# Patient Record
Sex: FEMALE | Race: WHITE | Employment: OTHER | URBAN - METROPOLITAN AREA
[De-identification: names, ages, dates, MRNs, and addresses within clinical notes are randomized per-mention and may not be internally consistent; named-entity substitution may affect disease eponyms.]

---

## 2017-02-02 ENCOUNTER — OFFICE VISIT (OUTPATIENT)
Dept: INTERNAL MEDICINE CLINIC | Age: 67
End: 2017-02-02

## 2017-02-02 DIAGNOSIS — R05.9 COUGH: ICD-10-CM

## 2017-02-02 DIAGNOSIS — H26.9 CATARACT, RIGHT EYE: ICD-10-CM

## 2017-02-02 DIAGNOSIS — Z01.810 PREOP CARDIOVASCULAR EXAM: Primary | ICD-10-CM

## 2017-02-02 RX ORDER — BENZONATATE 200 MG/1
200 CAPSULE ORAL
Qty: 30 CAP | Refills: 0 | Status: SHIPPED | OUTPATIENT
Start: 2017-02-02 | End: 2017-02-03 | Stop reason: SDUPTHER

## 2017-02-02 NOTE — PROGRESS NOTES
HPI:  Joanna Brownlee is a 73y.o. year old female who presents today for preoperative medical evaluation. Procedure/Surgery:right eye cataract removal and lens implant  Date of Procedure/Surgery: 2/8/17  Surgeon: Dr Tino Brown. Hospital/Surgical Facility: [facility]. Latex Allergy: no    Recent use of: No recent use of aspirin (ASA), NSAIDS or steroids  Anesthesia Complications: None  History of abnormal bleeding : None  Health Care Directive or Living Will: yes   Has post nasal drip with mild dry cough since cold symptoms 2 weeks ago. Symptoms are improving. Current Outpatient Prescriptions   Medication Sig Dispense Refill    anastrozole (ARIMIDEX) 1 mg tablet Take 1 Tab by mouth daily. 90 Tab 3    fexofenadine (ALLEGRA) 180 mg tablet Take 1 Tab by mouth daily. As needed for allergy 30 Tab 11    LORazepam (ATIVAN) 1 mg tablet Take 1mg lorazepam 30 minutes prior to MRI. Is not a maintenance drug. 1 Tab 0    Denosumab (PROLIA) 60 mg/mL injection 60 mg by SubCUTAneous route.  anastrozole (ARIMIDEX) 1 mg tablet Take 1 Tab by mouth daily. 90 Tab 3    MULTIVITAMINS (MULTIVITAMIN PO) Take  by mouth daily.  calcium-vitamin D (CALCIUM 500+D) 500 mg(1,250mg) -200 unit per tablet Take 1 Tab by mouth.       OTHER theracran HP daily       Allergies   Allergen Reactions    Bactrim [Sulfamethoprim Ds] Other (comments)     Skin reaction    Ciprofloxacin Rash    Relafen [Nabumetone] Swelling     Facial Swelling     Past Medical History   Diagnosis Date    Avascular necrosis [facility]) age 45      right hip > replaced    AVN (avascular necrosis of bone) ([facility] age 56       (1997)     left hip > replaced    Breast cancer ([facility] 5/21/2013    Ectopic pregnancy age 44     (26)    Nausea & vomiting     Osteoarthritis of finger(s) age 53       (36)    Osteoporosis age 51   (56)    Other and unspecified hyperlipidemia 7/6/2010    Proctitis     Sarcoidosis ([facility] age 30   (200)     entirely resolved 12     Past Surgical History   Procedure Laterality Date    Pr treat ectopic preg,non remval  age 44  (1993)    Endoscopy, colon, diagnostic  age 55   (2006     told to repeat in 5 years Dr. Delores Goins Pr bone graft femur head/neck/ridge  age 56    (1997)     Left Hip    Pr revise total hip replacement  age 53     (1998)     right hip    Hx fracture tx  age 51    (1999)     5th metatarsal left foot    Pr incise finger tendon sheath   age 47    (2002)     Trigger finger    Pr revise total hip replacement  age 65      (2012)     left hip    Hx refractive surgery      Pr breast surgery procedure unlisted       BREST BIOPIES X 4 WITH 1 MALIGNANT.     Hx tubal ligation  age 44      (26)   [facility] Hx oophorectomy  age 41       (65)     RIGHT ONLY    Hx dilation and curettage  age 24      (26)    Hx hysterectomy  age 40       (46)   [facility] Pr pelvis/hip joint surgery unlisted  age 45     (36)     RIGHT HIP REPLACEMENT    Hx orthopaedic  2012     LEFT HIP REPLACEMENT    Hx orthopaedic  1997     LEFT BONE GRAFT DONE    Hx orthopaedic  1998     RIGHT HIP REVISION    Hx orthopaedic  2012     RIGHT HIP REVISION    Hx breast lumpectomy  5/22/2013     BREAST LUMPECTOMY WITH AXILLARY NODE DISSECTION performed by Shree Edgar MD at [address] Hx heent  2012     LASER SURGERY FOR \"HOLES IN RT & TETACHED RETINA IN LT\"    Hx heent       EXTRACTION OF WISDOM TEETH    Hx heent  8/2015     vitrectomy     Family History   Problem Relation Age of Onset    Breast Cancer Mother 67     in situ    Cancer Mother 67     breast ca and esophageal    Heart defect Mother      murmur diag in 19's, never treated    Arthritis-osteo Mother      late 52's early 63's    Osteoporosis Mother 76    Other Mother 78     lupus - declared disease free in 2004    Arrhythmia Mother 80     pacemaker    Hypertension Father     Heart defect Father      murmur idagnosed in 19's    Kidney Disease Father 80     renal stent    High Cholesterol Father     Heart Disease Father      aortic stenosis    Osteoporosis Brother 47    Arthritis-osteo Brother 49    Other Brother      age 34 hypoglycemia & age 47 Raynaud    Heart Disease Maternal Grandfather      developed in 42's    Hypertension Paternal Grandfather     Kidney Disease Paternal Grandfather     Other Maternal Grandmother 71     amyotrophic lateral sclerosis (ALS)/ALS    Hypertension Brother 47    Other Brother 47     GERD    High Cholesterol Brother 47    Other Brother 62     Multilevel DDD    Other Brother 62     Spondylolithesis    Other Brother 62     Servin's Esophagus, Diverticulosis, Gastritis    Other Brother 62     Bladder neck obstruction, swollen adrenal glands, small vessel disease    Cancer Brother 61     Melanoma and Squamous Cell Carcinoma     Social History   Substance Use Topics    Smoking status: Former Smoker     Packs/day: 0.25     Years: 6.00     Quit date: 1/1/1980    Smokeless tobacco: Never Used    Alcohol use 0.5 oz/week     1 Glasses of wine per week             Review of Systems   Constitutional: Negative for chills, fever and malaise/fatigue. HENT: Negative for congestion and sore throat. Eyes: Positive for blurred vision. Respiratory: Negative for cough, shortness of breath and wheezing. Cardiovascular: Negative for chest pain, palpitations and leg swelling. Gastrointestinal: Negative for abdominal pain, blood in stool, constipation, diarrhea, heartburn, nausea and vomiting. Genitourinary: Negative for dysuria, frequency, hematuria and urgency. Musculoskeletal: Negative for back pain and joint pain. Skin: Negative for rash. Neurological: Negative for dizziness, sensory change, focal weakness and headaches. Psychiatric/Behavioral: Negative for depression. The patient is not nervous/anxious and does not have insomnia. Physical Exam   Constitutional: She appears well-developed. No distress.    /78 (BP 1 Location: Right arm, BP Patient Position: Sitting)  Pulse 97  Temp 98.1 °F (36.7 °C) (Oral)   Resp 16  Ht 5' 0.5\" (1.537 m)  Wt 105 lb 9.6 oz (47.9 kg)  SpO2 98%  BMI 20.28 kg/m2   HENT:   Head: Normocephalic. Nose: Nose normal.   Mouth/Throat: Oropharynx is clear and moist.   Eyes: Conjunctivae and EOM are normal. Pupils are equal, round, and reactive to light. Neck: Carotid bruit is not present. No thyromegaly present. Cardiovascular: Normal rate, regular rhythm, normal heart sounds and intact distal pulses. No murmur heard. Pulmonary/Chest: Effort normal and breath sounds normal. She has no wheezes. Abdominal: Soft. Bowel sounds are normal. She exhibits no distension and no mass. There is no tenderness. Musculoskeletal: She exhibits no edema. Lymphadenopathy:     She has no cervical adenopathy. Psychiatric: She has a normal mood and affect. Vitals reviewed. Assessment & Plan:    ICD-10-CM ICD-9-CM    1. Preop cardiovascular exam   No contraindication to planned procedure. Z01.810 V72.81    2. Cataract, right eye H26.9 366.9    3. Cough  Flor Tamra resolving with some mild residual cough. Tessalon prn   R05 786.2      Orders Placed This Encounter    benzonatate (TESSALON) 200 mg capsule      Follow-up Disposition:  Return if symptoms worsen or fail to improve. Advised her to call back or return to office if symptoms worsen/change/persist.  Discussed expected course/resolution/complications of diagnosis in detail with patient. Medication risks/benefits/costs/interactions/alternatives discussed with patient. She was given an after visit summary which includes diagnoses, current medications, & vitals. She expressed understanding with the diagnosis and plan.

## 2017-02-03 RX ORDER — BENZONATATE 200 MG/1
200 CAPSULE ORAL
Qty: 10 CAP | Refills: 0 | Status: SHIPPED | OUTPATIENT
Start: 2017-02-03 | End: 2017-02-10

## 2017-02-06 VITALS
OXYGEN SATURATION: 98 % | TEMPERATURE: 98.1 F | HEIGHT: 61 IN | RESPIRATION RATE: 16 BRPM | DIASTOLIC BLOOD PRESSURE: 78 MMHG | SYSTOLIC BLOOD PRESSURE: 136 MMHG | WEIGHT: 105.6 LBS | BODY MASS INDEX: 19.94 KG/M2 | HEART RATE: 97 BPM

## 2017-04-10 RX ORDER — LORAZEPAM 1 MG/1
TABLET ORAL
Qty: 1 TAB | Refills: 0 | OUTPATIENT
Start: 2017-04-10

## 2017-04-10 RX ORDER — ANASTROZOLE 1 MG/1
1 TABLET ORAL DAILY
Qty: 90 TAB | Refills: 3 | Status: SHIPPED | OUTPATIENT
Start: 2017-04-10 | End: 2018-04-16 | Stop reason: SDUPTHER

## 2017-04-10 NOTE — TELEPHONE ENCOUNTER
Patient calling to get a 90 day refill supply sent to the pharmacy as well as a single ativan sent to the [facility]. Please call the patient if any questions [phone]. Requested Prescriptions     Pending Prescriptions Disp Refills    anastrozole (ARIMIDEX) 1 mg tablet 90 Tab 3     Sig: Take 1 Tab by mouth daily.  LORazepam (ATIVAN) 1 mg tablet 1 Tab 0     Sig: Take 1mg lorazepam 30 minutes prior to MRI. Is not a maintenance drug.

## 2017-04-11 RX ORDER — LORAZEPAM 1 MG/1
TABLET ORAL
Qty: 1 TAB | Refills: 0 | Status: SHIPPED | OUTPATIENT
Start: 2017-04-11 | End: 2017-09-07

## 2017-04-11 NOTE — TELEPHONE ENCOUNTER
Patient has MRI scheduled for beginning of may and wanted to make sure that this was processed in enough time for her to get it.

## 2017-04-11 NOTE — TELEPHONE ENCOUNTER
V.O for refill of Lorazepam called to [facility] pharmacist per written order by provider.

## 2017-05-04 ENCOUNTER — DOCUMENTATION ONLY (OUTPATIENT)
Dept: ONCOLOGY | Age: 67
End: 2017-05-04

## 2017-05-04 DIAGNOSIS — C50.911 MALIGNANT NEOPLASM OF RIGHT FEMALE BREAST, UNSPECIFIED SITE OF BREAST: Primary | ICD-10-CM

## 2017-05-04 DIAGNOSIS — Z98.890 S/P LUMPECTOMY, RIGHT BREAST: ICD-10-CM

## 2017-05-04 NOTE — PROGRESS NOTES
Request for Bilateral Breast MRI with and without contrast recd from [address]. TO provider. Need to fax to:  [phone] when complete.

## 2017-05-16 ENCOUNTER — DOCUMENTATION ONLY (OUTPATIENT)
Dept: ONCOLOGY | Age: 67
End: 2017-05-16

## 2017-05-23 ENCOUNTER — OFFICE VISIT (OUTPATIENT)
Dept: ONCOLOGY | Age: 67
End: 2017-05-23

## 2017-05-23 VITALS
BODY MASS INDEX: 19.63 KG/M2 | SYSTOLIC BLOOD PRESSURE: 105 MMHG | RESPIRATION RATE: 20 BRPM | TEMPERATURE: 98.3 F | WEIGHT: 104 LBS | HEIGHT: 61 IN | DIASTOLIC BLOOD PRESSURE: 70 MMHG | OXYGEN SATURATION: 100 % | HEART RATE: 74 BPM

## 2017-05-23 DIAGNOSIS — C50.911 MALIGNANT NEOPLASM OF RIGHT FEMALE BREAST, UNSPECIFIED SITE OF BREAST: Primary | ICD-10-CM

## 2017-05-23 DIAGNOSIS — F41.8 ANXIETY ABOUT HEALTH: ICD-10-CM

## 2017-05-23 DIAGNOSIS — Z79.811 AROMATASE INHIBITOR USE: ICD-10-CM

## 2017-05-23 DIAGNOSIS — Z98.890 S/P LUMPECTOMY, RIGHT BREAST: ICD-10-CM

## 2017-05-23 DIAGNOSIS — M81.0 OSTEOPOROSIS, UNSPECIFIED OSTEOPOROSIS TYPE, UNSPECIFIED PATHOLOGICAL FRACTURE PRESENCE: ICD-10-CM

## 2017-05-23 RX ORDER — AMOXICILLIN 500 MG/1
CAPSULE ORAL
COMMUNITY
Start: 2017-05-01 | End: 2017-09-07

## 2017-05-23 RX ORDER — CEPHALEXIN 250 MG/1
CAPSULE ORAL AS NEEDED
COMMUNITY
Start: 2017-05-22 | End: 2017-09-07

## 2017-05-23 RX ORDER — NITROFURANTOIN 25; 75 MG/1; MG/1
CAPSULE ORAL
COMMUNITY
Start: 2017-03-17 | End: 2017-05-23 | Stop reason: ALTCHOICE

## 2017-05-23 RX ORDER — DICLOFENAC SODIUM 1 MG/ML
SOLUTION/ DROPS OPHTHALMIC
COMMUNITY
Start: 2017-03-23 | End: 2017-05-23 | Stop reason: ALTCHOICE

## 2017-05-23 NOTE — MR AVS SNAPSHOT
Visit Information Date & Time Provider Department Dept. Phone Encounter #  
 5/23/2017  9:00 AM Rupinder Bustos, [address]  Oncology at [address] [id number] Follow-up Instructions Return in about 6 months (around 11/23/2017). Routing History Follow-up and Disposition History Your Appointments 10/24/2017  9:15 AM  
Medicare Physical with Matt Whittington MD  
[facility] Internal Medicine [facility]) Appt Note: CPE wellness [address] Critical access hospital [address]  
[id number] [address] Upcoming Health Maintenance Date Due INFLUENZA AGE 9 TO ADULT 8/1/2017 MEDICARE YEARLY EXAM 10/19/2017 GLAUCOMA SCREENING Q2Y 4/28/2018 BREAST CANCER SCRN MAMMOGRAM 7/5/2018 DTaP/Tdap/Td series (2 - Td) 10/14/2018 COLONOSCOPY 10/8/2023 Allergies as of 5/23/2017  Review Complete On: 5/23/2017 By: Rupinder Bustos DO Severity Noted Reaction Type Reactions Bactrim [Sulfamethoprim Ds]  10/18/2016    Other (comments) Skin reaction Ciprofloxacin  10/18/2016    Rash Relafen [Nabumetone] Low 08/18/2011    Swelling Facial Swelling Current Immunizations  Reviewed on 11/10/2016 Name Date Hepatitis A Vaccine 11/1/1999, 11/1/1999 Hepatitis B Vaccine 1/1/2004, 1/1/2000, 1/1/2000 Influenza Vaccine 10/18/2016, 10/15/2015, 9/25/2013 PPD 12/14/2004 Pneumococcal Conjugate (PCV-13) 3/10/2016 Pneumococcal Polysaccharide (PPSV-23) 3/13/2017 TD Vaccine 7/5/2006 TDAP Vaccine 10/14/2008 Zoster 4/15/2011 Not reviewed this visit You Were Diagnosed With   
  
 Codes Comments Malignant neoplasm of right female breast, unspecified site of breast ([facility]    -  Primary ICD-10-CM: C50.911 ICD-9-CM: 174.9 S/P lumpectomy, right breast     ICD-10-CM: Z90.11 ICD-9-CM: V45.89   
 Aromatase inhibitor use     ICD-10-CM: V48.705 ICD-9-CM: V07.52 Osteoporosis, unspecified osteoporosis type, unspecified pathological fracture presence     ICD-10-CM: M81.0 ICD-9-CM: 733.00 Anxiety about health     ICD-10-CM: F41.8 ICD-9-CM: 300.09 Vitals BP Pulse Temp Resp Height(growth percentile) Weight(growth percentile) 105/70 74 98.3 °F (36.8 °C) 20 5' 0.5\" (1.537 m) 104 lb (47.2 kg) SpO2 BMI OB Status Smoking Status 100% 19.98 kg/m2 Hysterectomy Former Smoker Vitals History BMI and BSA Data Body Mass Index Body Surface Area 19.98 kg/m 2 1.42 m 2 Preferred Pharmacy Pharmacy Name Phone [facility] [address] [place] [address], . [address] [phone] Your Updated Medication List  
  
   
This list is accurate as of: 5/23/17  9:40 AM.  Always use your most recent med list.  
  
  
  
  
 amoxicillin 500 mg capsule Commonly known as:  AMOXIL Prior to procedure * anastrozole 1 mg tablet Commonly known as:  ARIMIDEX Take 1 Tab by mouth daily. * anastrozole 1 mg tablet Commonly known as:  ARIMIDEX Take 1 Tab by mouth daily. CALCIUM 500+D 500 mg(1,250mg) -200 unit per tablet Generic drug:  calcium-vitamin D Take 1 Tab by mouth. cephALEXin 250 mg capsule Commonly known as:  [place]  
as needed. fexofenadine 180 mg tablet Commonly known as:  Kolleen Memory Take 1 Tab by mouth daily. As needed for allergy LORazepam 1 mg tablet Commonly known as:  ATIVAN Take 1mg lorazepam 30 minutes prior to MRI. Is not a maintenance drug. MULTIVITAMIN PO Take  by mouth daily. OTHER  
theracran HP daily PROLIA 60 mg/mL injection Generic drug:  denosumab 60 mg by SubCUTAneous route. * Notice: This list has 2 medication(s) that are the same as other medications prescribed for you.  Read the directions carefully, and ask your doctor or other care provider to review them with you. Follow-up Instructions Return in about 6 months (around 11/23/2017). Introducing [facility]! Dear Marine Hernandez: 
Thank you for requesting a [employer] account. Our records indicate that you already have an active [employer] account. You can access your account anytime at [email or web address]. [email or web address] Did you know that you can access your hospital and ER discharge instructions at any time in [employer]? You can also review all of your test results from your hospital stay or ER visit. Additional Information If you have questions, please visit the Frequently Asked Questions section of the [employer] website at [email or web address] Remember, [employer] is NOT to be used for urgent needs. For medical emergencies, dial 911. Now available from your iPhone and Android! Please provide this summary of care documentation to your next provider. Your primary care clinician is listed as Demario Cannon. If you have any questions after today's visit, please call [phone].

## 2017-05-23 NOTE — PROGRESS NOTES
HEME/ONC PROGRESS NOTE         Eulogio Butts is a 78 y.o. 1950 female and presents with Breast Cancer (follow up)    CC  Breast cancer dx 5/13    HPI: Patient is here for 6 mo fu breast cancer currently taking adjuvant Arimidex. Pt is doing well overall. She is tolerating the Arimidex well. MRI and mammo at [facility] 5/17 and had biopsy path fibrosis. Pt sends results to rad/onc and [facility].   Saw PCP and had labs 10/16 good. Pt has a mass at biopsy site and discomfort at site. To see surgery for f/u also. Has lots of questions today. DX   Breast Cancer     STAGE: T1bN0  ER+ her2 neg  oncotype low risk    TREATMENT COURSE: lumpectomy right 5/22/13,  XRT,  Arimidex. Past Medical History:   Diagnosis Date    Avascular necrosis [facility]) age 45     right hip > replaced    AVN (avascular necrosis of bone) ([facility] age 56       (1997)    left hip > replaced    Breast cancer ([facility] 5/21/2013    Ectopic pregnancy age 44     (26)    Nausea & vomiting     Osteoarthritis of finger(s) age 53       (36)    Osteoporosis age 51   (56)    Other and unspecified hyperlipidemia 7/6/2010    Proctitis     Sarcoidosis ([facility] age 30   (200)    entirely resolved 1986     Past Surgical History:   Procedure Laterality Date    BONE GRAFT FEMUR HEAD/NECK/RIDGE  age 56    (5)    Left Hip    BREAST SURGERY PROCEDURE UNLISTED      BREST BIOPIES X 4 WITH 1 MALIGNANT.     ENDOSCOPY, COLON, DIAGNOSTIC  age 55   (2006    told to repeat in 5 years Dr. Vinod Truong LUMPECTOMY  5/22/2013    BREAST LUMPECTOMY WITH AXILLARY NODE DISSECTION performed by Selene Mishra MD at [address]  age 24      (26)    HX FRACTURE TX  age 51    (56)    5th metatarsal left foot    HX HEENT  2012    LASER SURGERY FOR \"HOLES IN RT & TETACHED RETINA IN LT\"    HX HEENT      EXTRACTION OF WISDOM TEETH    HX HEENT  8/2015    vitrectomy    HX HYSTERECTOMY  age 40 (1996)    HX OOPHORECTOMY  age 41       (65)    RIGHT ONLY    HX ORTHOPAEDIC  2012    LEFT HIP REPLACEMENT    HX ORTHOPAEDIC  1997    LEFT BONE GRAFT DONE    HX ORTHOPAEDIC  1998    RIGHT HIP REVISION    HX ORTHOPAEDIC  2012    RIGHT HIP REVISION    HX REFRACTIVE SURGERY      HX TUBAL LIGATION  age 44      (26)    INCISE FINGER TENDON SHEATH   age 47    (65)    Trigger finger    PELVIS/HIP JOINT SURGERY UNLISTED  age 45     (36)    RIGHT HIP REPLACEMENT    OR REVISE TOTAL HIP REPLACEMENT  age 53     (56)    right hip    OR REVISE TOTAL HIP REPLACEMENT  age 65      (2012)    left hip    TREAT ECTOPIC PREG,NON REMVAL  age 44  (26)     Social History     Social History    Marital status:      Spouse name: N/A    Number of children: N/A    Years of education: N/A     Social History Main Topics    Smoking status: Former Smoker     Packs/day: 0.25     Years: 6.00     Quit date: 1/1/1980    Smokeless tobacco: Never Used    Alcohol use 0.5 oz/week     1 Glasses of wine per week    Drug use: No    Sexual activity: Yes     Partners: Male     Birth control/ protection: None     Other Topics Concern    None     Social History Narrative     Family History   Problem Relation Age of Onset    Breast Cancer Mother 67     in situ    Cancer Mother 67     breast ca and esophageal    Heart defect Mother      murmur diag in 19's, never treated    Arthritis-osteo Mother      late 52's early 63's    Osteoporosis Mother 76    Other Mother 78     lupus - declared disease free in 2004    Arrhythmia Mother 80     pacemaker    Hypertension Father     Heart defect Father      murmur idagnosed in 19's    Kidney Disease Father 80     renal stent    High Cholesterol Father     Heart Disease Father      aortic stenosis    Osteoporosis Brother 47    Arthritis-osteo Brother 47    Other Brother      age 34 hypoglycemia & age 47 Raynaud    Heart Disease Maternal Grandfather      developed in 42's  Hypertension Paternal Grandfather     Kidney Disease Paternal Grandfather     Other Maternal Grandmother 71     amyotrophic lateral sclerosis (ALS)/ALS    Hypertension Brother 47    Other Brother 47     GERD    High Cholesterol Brother 47    Other Brother 62     Multilevel DDD    Other Brother 62     Spondylolithesis    Other Brother 62     Servin's Esophagus, Diverticulosis, Gastritis    Other Brother 62     Bladder neck obstruction, swollen adrenal glands, small vessel disease    Cancer Brother 63     Melanoma and Squamous Cell Carcinoma       Current Outpatient Prescriptions   Medication Sig Dispense Refill    amoxicillin (AMOXIL) 500 mg capsule Prior to procedure      cephALEXin (KEFLEX) 250 mg capsule as needed.  LORazepam (ATIVAN) 1 mg tablet Take 1mg lorazepam 30 minutes prior to MRI. Is not a maintenance drug. 1 Tab 0    anastrozole (ARIMIDEX) 1 mg tablet Take 1 Tab by mouth daily. 90 Tab 3    anastrozole (ARIMIDEX) 1 mg tablet Take 1 Tab by mouth daily. 90 Tab 3    OTHER theracran HP daily      Denosumab (PROLIA) 60 mg/mL injection 60 mg by SubCUTAneous route.  MULTIVITAMINS (MULTIVITAMIN PO) Take  by mouth daily.  calcium-vitamin D (CALCIUM 500+D) 500 mg(1,250mg) -200 unit per tablet Take 1 Tab by mouth.  fexofenadine (ALLEGRA) 180 mg tablet Take 1 Tab by mouth daily. As needed for allergy 30 Tab 11       Allergies   Allergen Reactions    Bactrim [Sulfamethoprim Ds] Other (comments)     Skin reaction    Ciprofloxacin Rash    Relafen [Nabumetone] Swelling     Facial Swelling       Review of Systems    A comprehensive review of systems was negative except for:  Per HPI.       Objective:  Visit Vitals    /70    Pulse 74    Temp 98.3 °F (36.8 °C)    Resp 20    Ht 5' 0.5\" (1.537 m)    Wt 104 lb (47.2 kg)    SpO2 100%    BMI 19.98 kg/m2         Physical Exam:   General appearance - alert, well appearing, and in no distress, oriented to person, place, and time and normal appearing weight  Mental status - alert, oriented to person, place, and time, normal mood, behavior, speech, dress, motor activity, and thought processes  EYE-conj clear  Mouth - mucous membranes moist, pharynx normal without lesions  Neck - supple, no significant adenopathy   Chest - clear to auscultation, no wheezes, rales or rhonchi, symmetric air entry   Heart - normal rate and regular rhythm  Abdomen - soft, nontender  Ext-no pedal edema noted  Skin-Warm and dry. No rashes/lesions noted. Neuro -alert, oriented, normal speech, no focal findings or movement disorder noted  Breast -no masses palpable b/l except for mass at biopsy site ? Hematoma. Diagnostic Imaging   reviewed  Results for orders placed during the hospital encounter of 10/04/12   XR HIP RT AP 1 V    Narrative **Final Report**       ICD Codes / Adm. Diagnosis:    / Tamiko Nelson DUNNE RIGHT TOAL HI  DJD  Examination:  CR HIP 1 VW UNI RT  - [id number] - Oct  4 2012 11:02AM  Accession No:  [id number]  Reason:  Post-op Study      REPORT:  INDICATION:  DJD, postop. COMPARISON:  No old study. FINDINGS:  A portable view of the right hip shows a hip replacement in   satisfactory position. Postoperative change in the soft tissues with a   surgical drain and overlying sutures is present. IMPRESSION:  Right hip replacement. Signing/Reading Doctor: Bry Mir ([id number])    Approved: Bry Mir ([id number])  10/04/2012                                      No results found for this or any previous visit.     Lab Results  Reviewed  Per PCP    Lab Results   Component Value Date/Time    WBC 5.2 10/04/2016 09:42 AM    HGB 14.2 10/04/2016 09:42 AM    HCT 41.9 10/04/2016 09:42 AM    PLATELET 541 62/11/6100 09:42 AM    MCV 94 10/04/2016 09:42 AM       Lab Results   Component Value Date/Time    Sodium 142 10/04/2016 09:42 AM    Potassium 4.4 10/04/2016 09:42 AM    Chloride 103 10/04/2016 09:42 AM CO2 26 10/04/2016 09:42 AM    Anion gap 5 05/14/2013 11:15 AM    Glucose 89 10/04/2016 09:42 AM    BUN 16 10/04/2016 09:42 AM    Creatinine 0.74 10/04/2016 09:42 AM    BUN/Creatinine ratio 22 10/04/2016 09:42 AM    GFR est AA 98 10/04/2016 09:42 AM    GFR est non-AA 85 10/04/2016 09:42 AM    Calcium 9.3 10/04/2016 09:42 AM    AST (SGOT) 24 10/04/2016 09:42 AM    Alk. phosphatase 69 10/04/2016 09:42 AM    Protein, total 6.2 10/04/2016 09:42 AM    Albumin 4.4 10/04/2016 09:42 AM    Globulin 2.7 06/22/2010 08:32 AM    A-G Ratio 2.4 10/04/2016 09:42 AM    ALT (SGPT) 20 10/04/2016 09:42 AM       Assessment/Plan:     Marian Mcginnis is a 59 y.o. 1950 female and presents with Breast Cancer  . CC  Breast cancer    HPI: Patient is here for breast cancer currently taking adjuvant Arimidex. Doing well. STAGE: T1bN0  ER+ her2 neg  oncotype low risk. TREATMENT COURSE: lumpectomy 5/22/13  XRT. Arimadex. 1.  Stage 1 ER+ Her2 neg Breast cancer s/p lump/ XRT. NERD    Patient is tolerating Arimidex with minimal side effects    Continue Arimidex for 5 years and then can do BCI as pt wants to know risk of recurrence then.     mammo and breast MRI done at [facility] 5/17 and had biopsy with mass at site and path fibrosis. Recommend surgery fu and pt will call surgery. Labs stable per PCP     2. Osteoporosis  Per endocrine. Remains on prolia. 3.  Vaginal dryness. Per GYN. Will follow-up in 6 months, sooner if needed. Call if questions. ICD-10-CM ICD-9-CM    1. Malignant [facility] right female breast, [facility] C50.911 174.9    2. S/P lumpectomy, right breast Z90.11 V45.89    3. Aromatase inhibitor use Z79.811 V07.52    4. Osteoporosis, unspecified osteoporosis type, unspecified pathological fracture presence M81.0 733.00    5.  Anxiety about health F41.8 300.09      Orders Placed This Encounter    amoxicillin (AMOXIL) 500 mg capsule     Sig: Prior to procedure    cephALEXin (KEFLEX) 250 mg capsule     Sig: as needed.  DISCONTD: diclofenac (VOLTAREN) 0.1 % ophthalmic solution    DISCONTD: nitrofurantoin, macrocrystal-monohydrate, (MACROBID) 100 mg capsule       continue present plan, call if any problems  There are no Patient Instructions on file for this visit. Follow-up Disposition:  Return in about 6 months (around 11/23/2017).     Yousuf Cat, DO

## 2017-09-05 ENCOUNTER — TELEPHONE (OUTPATIENT)
Dept: INTERNAL MEDICINE CLINIC | Age: 67
End: 2017-09-05

## 2017-09-05 NOTE — TELEPHONE ENCOUNTER
I called the patient and she stated that she was notified that the records were fax'd on Saturday. I have checked the mail and at this time we do not have updated records. Pt states that she will call the Eye doctor and make a follow up visit with Dr Liam Malcolm once we notifie her that we have rec'd the records.

## 2017-09-05 NOTE — TELEPHONE ENCOUNTER
Pt called back to the ofice and requested that our office call [facility] and request records from 09/02/2017. I called and spoke to Manuel Vaca who requested that I fax a records request over to [phone] d/t this not being a same day request.  Records for records were fax'd and confirmation received.

## 2017-09-06 ENCOUNTER — TELEPHONE (OUTPATIENT)
Dept: INTERNAL MEDICINE CLINIC | Age: 67
End: 2017-09-06

## 2017-09-06 NOTE — TELEPHONE ENCOUNTER
Patient called to see if we had received records from [facility] concerning her emergency appt over the weekend.   She will be bringing a copy by the office this morning -- has an appt with Dr. Otf Pa am.

## 2017-09-07 ENCOUNTER — HOSPITAL ENCOUNTER (OUTPATIENT)
Age: 67
Setting detail: OBSERVATION
Discharge: HOME | End: 2017-09-07
Attending: EMERGENCY MEDICINE | Admitting: HOSPITALIST
Payer: MEDICARE

## 2017-09-07 ENCOUNTER — APPOINTMENT (OUTPATIENT)
Dept: MRI IMAGING | Age: 67
End: 2017-09-07
Attending: HOSPITALIST
Payer: MEDICARE

## 2017-09-07 ENCOUNTER — APPOINTMENT (OUTPATIENT)
Dept: CT IMAGING | Age: 67
End: 2017-09-07
Attending: EMERGENCY MEDICINE
Payer: MEDICARE

## 2017-09-07 ENCOUNTER — OFFICE VISIT (OUTPATIENT)
Dept: INTERNAL MEDICINE CLINIC | Age: 67
End: 2017-09-07

## 2017-09-07 VITALS
OXYGEN SATURATION: 99 % | BODY MASS INDEX: 20.73 KG/M2 | TEMPERATURE: 97.5 F | HEIGHT: 60 IN | SYSTOLIC BLOOD PRESSURE: 135 MMHG | DIASTOLIC BLOOD PRESSURE: 81 MMHG | WEIGHT: 105.6 LBS | RESPIRATION RATE: 14 BRPM | HEART RATE: 67 BPM

## 2017-09-07 VITALS
DIASTOLIC BLOOD PRESSURE: 70 MMHG | OXYGEN SATURATION: 99 % | RESPIRATION RATE: 16 BRPM | SYSTOLIC BLOOD PRESSURE: 120 MMHG | HEART RATE: 56 BPM | HEIGHT: 61 IN | BODY MASS INDEX: 19.86 KG/M2 | TEMPERATURE: 97.6 F | WEIGHT: 105.2 LBS

## 2017-09-07 DIAGNOSIS — H54.3 VISION LOSS, BILATERAL: Primary | ICD-10-CM

## 2017-09-07 DIAGNOSIS — H53.9 VISION ABNORMALITIES: Primary | ICD-10-CM

## 2017-09-07 DIAGNOSIS — H53.139: ICD-10-CM

## 2017-09-07 DIAGNOSIS — H53.9 VISION CHANGES: ICD-10-CM

## 2017-09-07 DIAGNOSIS — E78.00 PURE HYPERCHOLESTEROLEMIA: ICD-10-CM

## 2017-09-07 LAB
ALBUMIN SERPL-MCNC: 4.1 G/DL (ref 3.5–5)
ALBUMIN/GLOB SERPL: 1.2 {RATIO} (ref 1.1–2.2)
ALP SERPL-CCNC: 83 U/L (ref 45–117)
ALT SERPL-CCNC: 33 U/L (ref 12–78)
ANION GAP SERPL CALC-SCNC: 8 MMOL/L (ref 5–15)
AST SERPL-CCNC: 25 U/L (ref 15–37)
ATRIAL RATE: 62 BPM
BASOPHILS # BLD: 0 K/UL (ref 0–0.1)
BASOPHILS NFR BLD: 0 % (ref 0–1)
BILIRUB SERPL-MCNC: 0.6 MG/DL (ref 0.2–1)
BUN SERPL-MCNC: 13 MG/DL (ref 6–20)
BUN/CREAT SERPL: 15 (ref 12–20)
CALCIUM SERPL-MCNC: 9.2 MG/DL (ref 8.5–10.1)
CALCULATED P AXIS, ECG09: 53 DEGREES
CALCULATED R AXIS, ECG10: 23 DEGREES
CALCULATED T AXIS, ECG11: 32 DEGREES
CHLORIDE SERPL-SCNC: 106 MMOL/L (ref 97–108)
CHOLEST SERPL-MCNC: 251 MG/DL
CO2 SERPL-SCNC: 27 MMOL/L (ref 21–32)
CREAT SERPL-MCNC: 0.85 MG/DL (ref 0.55–1.02)
DIAGNOSIS, 93000: NORMAL
EOSINOPHIL # BLD: 0 K/UL (ref 0–0.4)
EOSINOPHIL NFR BLD: 0 % (ref 0–7)
ERYTHROCYTE [DISTWIDTH] IN BLOOD BY AUTOMATED COUNT: 12.2 % (ref 11.5–14.5)
ERYTHROCYTE [SEDIMENTATION RATE] IN BLOOD: 16 MM/HR (ref 0–30)
EST. AVERAGE GLUCOSE BLD GHB EST-MCNC: 105 MG/DL
GLOBULIN SER CALC-MCNC: 3.5 G/DL (ref 2–4)
GLUCOSE SERPL-MCNC: 94 MG/DL (ref 65–100)
HBA1C MFR BLD: 5.3 % (ref 4.2–6.3)
HCT VFR BLD AUTO: 42.5 % (ref 35–47)
HDLC SERPL-MCNC: 81 MG/DL
HDLC SERPL: 3.1 {RATIO} (ref 0–5)
HGB BLD-MCNC: 14.2 G/DL (ref 11.5–16)
LDLC SERPL CALC-MCNC: 146.2 MG/DL (ref 0–100)
LIPID PROFILE,FLP: ABNORMAL
LYMPHOCYTES # BLD: 1.6 K/UL (ref 0.8–3.5)
LYMPHOCYTES NFR BLD: 24 % (ref 12–49)
MCH RBC QN AUTO: 31.6 PG (ref 26–34)
MCHC RBC AUTO-ENTMCNC: 33.4 G/DL (ref 30–36.5)
MCV RBC AUTO: 94.7 FL (ref 80–99)
MONOCYTES # BLD: 0.6 K/UL (ref 0–1)
MONOCYTES NFR BLD: 9 % (ref 5–13)
NEUTS SEG # BLD: 4.6 K/UL (ref 1.8–8)
NEUTS SEG NFR BLD: 67 % (ref 32–75)
P-R INTERVAL, ECG05: 148 MS
PLATELET # BLD AUTO: 247 K/UL (ref 150–400)
POTASSIUM SERPL-SCNC: 3.5 MMOL/L (ref 3.5–5.1)
PROT SERPL-MCNC: 7.6 G/DL (ref 6.4–8.2)
Q-T INTERVAL, ECG07: 408 MS
QRS DURATION, ECG06: 66 MS
QTC CALCULATION (BEZET), ECG08: 414 MS
RBC # BLD AUTO: 4.49 M/UL (ref 3.8–5.2)
SODIUM SERPL-SCNC: 141 MMOL/L (ref 136–145)
TRIGL SERPL-MCNC: 119 MG/DL (ref ?–150)
TROPONIN I SERPL-MCNC: <0.04 NG/ML
VENTRICULAR RATE, ECG03: 62 BPM
VLDLC SERPL CALC-MCNC: 23.8 MG/DL
WBC # BLD AUTO: 6.9 K/UL (ref 3.6–11)

## 2017-09-07 PROCEDURE — 70553 MRI BRAIN STEM W/O & W/DYE: CPT

## 2017-09-07 PROCEDURE — 99285 EMERGENCY DEPT VISIT HI MDM: CPT

## 2017-09-07 PROCEDURE — 85025 COMPLETE CBC W/AUTO DIFF WBC: CPT | Performed by: EMERGENCY MEDICINE

## 2017-09-07 PROCEDURE — 70544 MR ANGIOGRAPHY HEAD W/O DYE: CPT

## 2017-09-07 PROCEDURE — 93005 ELECTROCARDIOGRAM TRACING: CPT

## 2017-09-07 PROCEDURE — A9576 INJ PROHANCE MULTIPACK: HCPCS | Performed by: HOSPITALIST

## 2017-09-07 PROCEDURE — G8978 MOBILITY CURRENT STATUS: HCPCS

## 2017-09-07 PROCEDURE — 74011250636 HC RX REV CODE- 250/636: Performed by: HOSPITALIST

## 2017-09-07 PROCEDURE — 80053 COMPREHEN METABOLIC PANEL: CPT | Performed by: EMERGENCY MEDICINE

## 2017-09-07 PROCEDURE — 80061 LIPID PANEL: CPT | Performed by: HOSPITALIST

## 2017-09-07 PROCEDURE — 99218 HC RM OBSERVATION: CPT

## 2017-09-07 PROCEDURE — 84484 ASSAY OF TROPONIN QUANT: CPT | Performed by: EMERGENCY MEDICINE

## 2017-09-07 PROCEDURE — 83036 HEMOGLOBIN GLYCOSYLATED A1C: CPT | Performed by: HOSPITALIST

## 2017-09-07 PROCEDURE — 97116 GAIT TRAINING THERAPY: CPT

## 2017-09-07 PROCEDURE — 36415 COLL VENOUS BLD VENIPUNCTURE: CPT | Performed by: EMERGENCY MEDICINE

## 2017-09-07 PROCEDURE — G8979 MOBILITY GOAL STATUS: HCPCS

## 2017-09-07 PROCEDURE — 85652 RBC SED RATE AUTOMATED: CPT | Performed by: PHYSICIAN ASSISTANT

## 2017-09-07 PROCEDURE — G8980 MOBILITY D/C STATUS: HCPCS

## 2017-09-07 PROCEDURE — 74011000250 HC RX REV CODE- 250: Performed by: PHYSICIAN ASSISTANT

## 2017-09-07 PROCEDURE — 93880 EXTRACRANIAL BILAT STUDY: CPT

## 2017-09-07 PROCEDURE — 97161 PT EVAL LOW COMPLEX 20 MIN: CPT

## 2017-09-07 PROCEDURE — 70450 CT HEAD/BRAIN W/O DYE: CPT

## 2017-09-07 PROCEDURE — 93306 TTE W/DOPPLER COMPLETE: CPT

## 2017-09-07 RX ORDER — ASPIRIN 81 MG/1
81 TABLET ORAL DAILY
Qty: 30 TAB | Refills: 1 | Status: SHIPPED | OUTPATIENT
Start: 2017-09-08

## 2017-09-07 RX ORDER — THERA TABS 400 MCG
1 TAB ORAL DAILY
Status: DISCONTINUED | OUTPATIENT
Start: 2017-09-08 | End: 2017-09-07 | Stop reason: HOSPADM

## 2017-09-07 RX ORDER — ANASTROZOLE 1 MG/1
1 TABLET ORAL DAILY
Status: DISCONTINUED | OUTPATIENT
Start: 2017-09-08 | End: 2017-09-07 | Stop reason: HOSPADM

## 2017-09-07 RX ORDER — TETRACAINE HYDROCHLORIDE 5 MG/ML
1 SOLUTION OPHTHALMIC
Status: COMPLETED | OUTPATIENT
Start: 2017-09-07 | End: 2017-09-07

## 2017-09-07 RX ORDER — THERA TABS 400 MCG
1 TAB ORAL DAILY
COMMUNITY
End: 2020-11-12

## 2017-09-07 RX ORDER — ATORVASTATIN CALCIUM 20 MG/1
40 TABLET, FILM COATED ORAL DAILY
Status: DISCONTINUED | OUTPATIENT
Start: 2017-09-08 | End: 2017-09-07 | Stop reason: HOSPADM

## 2017-09-07 RX ORDER — SODIUM CHLORIDE 0.9 % (FLUSH) 0.9 %
5-10 SYRINGE (ML) INJECTION EVERY 8 HOURS
Status: DISCONTINUED | OUTPATIENT
Start: 2017-09-07 | End: 2017-09-07 | Stop reason: HOSPADM

## 2017-09-07 RX ORDER — FERROUS SULFATE, DRIED 160(50) MG
1 TABLET, EXTENDED RELEASE ORAL
Status: DISCONTINUED | OUTPATIENT
Start: 2017-09-08 | End: 2017-09-07 | Stop reason: HOSPADM

## 2017-09-07 RX ORDER — SODIUM CHLORIDE 9 MG/ML
75 INJECTION, SOLUTION INTRAVENOUS CONTINUOUS
Status: DISCONTINUED | OUTPATIENT
Start: 2017-09-07 | End: 2017-09-07

## 2017-09-07 RX ORDER — SODIUM CHLORIDE 0.9 % (FLUSH) 0.9 %
20 SYRINGE (ML) INJECTION
Status: COMPLETED | OUTPATIENT
Start: 2017-09-07 | End: 2017-09-07

## 2017-09-07 RX ORDER — SODIUM CHLORIDE 0.9 % (FLUSH) 0.9 %
5-10 SYRINGE (ML) INJECTION AS NEEDED
Status: DISCONTINUED | OUTPATIENT
Start: 2017-09-07 | End: 2017-09-07 | Stop reason: HOSPADM

## 2017-09-07 RX ORDER — ASPIRIN 81 MG/1
81 TABLET ORAL DAILY
Status: DISCONTINUED | OUTPATIENT
Start: 2017-09-08 | End: 2017-09-07 | Stop reason: HOSPADM

## 2017-09-07 RX ORDER — SODIUM CHLORIDE 0.9 % (FLUSH) 0.9 %
10 SYRINGE (ML) INJECTION
Status: COMPLETED | OUTPATIENT
Start: 2017-09-07 | End: 2017-09-07

## 2017-09-07 RX ORDER — HYDRALAZINE HYDROCHLORIDE 20 MG/ML
10 INJECTION INTRAMUSCULAR; INTRAVENOUS
Status: DISCONTINUED | OUTPATIENT
Start: 2017-09-07 | End: 2017-09-07 | Stop reason: HOSPADM

## 2017-09-07 RX ADMIN — FLUORESCEIN SODIUM 1 STRIP: 1 STRIP OPHTHALMIC at 11:06

## 2017-09-07 RX ADMIN — GADOTERIDOL 10 ML: 279.3 INJECTION, SOLUTION INTRAVENOUS at 16:31

## 2017-09-07 RX ADMIN — Medication 20 ML: at 13:25

## 2017-09-07 RX ADMIN — Medication 10 ML: at 16:32

## 2017-09-07 RX ADMIN — TETRACAINE HYDROCHLORIDE 1 DROP: 5 SOLUTION OPHTHALMIC at 11:06

## 2017-09-07 NOTE — ROUTINE PROCESS
TRANSFER - OUT REPORT:    Verbal report given to [facility] RN(name) on Gabbie Patel  being transferred to George Rosales RN(unit) for routine progression of care       Report consisted of patients Situation, Background, Assessment and   Recommendations(SBAR). Information from the following report(s) SBAR, ED Summary, [facility] and Recent Results was reviewed with the receiving nurse. Lines:   Peripheral IV Right (Active)       Peripheral IV 09/07/17 Left Antecubital (Active)   Site Assessment Clean, dry, & intact 9/7/2017  9:58 AM   Phlebitis Assessment 0 9/7/2017  9:58 AM   Infiltration Assessment 0 9/7/2017  9:58 AM   Dressing Status Clean, dry, & intact 9/7/2017  9:58 AM   Dressing Type Transparent 9/7/2017  9:58 AM   Hub Color/Line Status Pink;Flushed;Patent 9/7/2017  9:58 AM   Action Taken Blood drawn 9/7/2017  9:58 AM        Opportunity for questions and clarification was provided.       Patient transported with:   transport

## 2017-09-07 NOTE — H&P
[address]   [address] [place] [address], [address]   HISTORY AND PHYSICAL       Name:  Stanislaw Law   MR#:  [id number]   :  1950   Account #:  [de-identified]        Date of Adm:  2017       PRIMARY CARE PHYSICIAN: Carmen Hernandez MD    PRIMARY ONCOLOGIST: Dr. Maikol Burns: Changes in vision. HISTORY OF PRESENT ILLNESS: This is a 60-year-old female with a   past medical history of breast cancer status post lumpectomy being   followed by Dr. Apryl Benavides, and osteoporosis. She comes over   here because of changes in vision. She claims that about 6 days   ago, on 2017, in the morning when she was taking a shower she   noted that there were changes in the vision on her left eye. How she   describes it is that about \"50% to 95%\" loss of vision in her left eye. That went on and off for some time, until after a few minute it   was resolved. She called her primary ophthalmologist, who is     and Dr. Sue Rodarte, and she was seen by her ophthalmologist the next   day where she was told that her eye examination was perfectly at her   baseline. She was asked to see her primary care physician for probable   TIA workup. She went to see her primary care physician and while in   the clinic she noted that similar symptoms were happening, but now in   the right eye. That is why the patient was brought into the ER. No chest pain, shortness of breath, headache, dizziness. No   imbalance. No nausea, vomiting, fever, cough, changes in bowel   movements. No head injury. REVIEW OF SYSTEMS: Pertinent positives as above. Rest of the   review of systems were reviewed and were negative. PAST MEDICAL HISTORY   1. Breast cancer   2. Osteoporosis. 3. Sarcoidosis. PAST SURGICAL HISTORY   1. Lumpectomy done in . 2. Hysterectomy done in .   3. Left hip replacement in . FAMILY HISTORY: Significant for breast cancer and coronary artery   disease.     SOCIAL HISTORY: Nonsmoker. Drinks alcohol socially. In the last 1   week, she has had 3 beers. Non-IV drug abuser. ALLERGIES   1. BACTRIM. 2. CIPROFLOXACIN. HOME ALLERGIES   1. Arimidex 1 mg p.o. daily. 2. Prolia 60 mg subcutaneous every 6 months. 3. Multivitamin daily. PHYSICAL EXAMINATION   VITAL SIGNS: At the time the patient was seen, vitals were blood   pressure 166/59, pulse 56, afebrile, respiratory rate 14, saturating   100% on room air. GENERAL: Not in distress, comfortably sitting on bed. HEAD: Normocephalic, atraumatic. EYES: PERRLA, EOMI. EARS: Bilateral hearing normal, no growths. NOSE: No polyps, no bleeding. MOUTH: Moist mucous membranes. Decent oral hygiene. NECK: Supple. No jugular venous distention, no thyromegaly. RESPIRATORY: Clear to auscultation bilaterally. No adventitious   breath sounds. CARDIOVASCULAR: S1, S2 normal. No murmurs, rubs or gallops. GASTROINTESTINAL: Bowel sounds present. Soft, nontender,   nondistended. NEUROLOGICAL: Cranial nerves 2 through 12 intact. Motor 5/5   bilaterally upper limbs and lower limbs. Sensory normal.   PSYCHIATRIC: Mood and affect appropriate. Alert and oriented x3. SKIN: No rashes or ulcers. LABORATORY AND RADIOLOGICAL RESULTS: WBC 6.9,   hemoglobin 14.2, hematocrit 42, and a platelet count of 239. Sodium   141, potassium 3.5, chloride 106, bicarbonate 27, glucose 94, BUN 13,   creatinine 0.85. Troponin x1 is negative. CT scan of the head was   done, which did not show any acute abnormalities. EKG shows normal   sinus tenderness. Changes suggestive of ischemia. ASSESSMENT AND PLAN: This is a 61-year-old female with past   medical history of breast cancer and osteoporosis. 1. She comes over here with changes in vision/amaurosis fugax   possibly.  The patient claims that she has a history of floaters and that   is why she follows up with the ophthalmologist. Currently, she is back   to her baseline but the possibility of a transient ischemic attack needs   to be ruled out. I would admit the patient under observation (patient   has already been informed about the observation status). I would get   an MRA as well as carotid and echo on the patient, that has already   been ordered. I will put the patient on aspirin and statin, and will   consult Neurology regarding the same. 2. Breast cancer, status post lumpectomy. The patient is being followed   by Dr. Tony Alvarado as an outpatient. 3. Osteoporosis. The patient is on Prolia. Follow up with her primary   care physician. I spent about 40 minutes in taking care of the patient.         Uriah Shearer MD      PG / TLM   D:  09/07/2017   12:09   T:  09/07/2017   12:38   Job #:  [id number]

## 2017-09-07 NOTE — PROGRESS NOTES
TRANSFER - IN REPORT:    Verbal report received from Sonia RN(name) on Mike [place]  being received from ED (unit) for routine progression of care      Report consisted of patients Situation, Background, Assessment and   Recommendations(SBAR). Information from the following report(s) SBAR, Kardex, ED Summary, [facility] and Recent Results was reviewed with the receiving nurse. Opportunity for questions and clarification was provided. Assessment completed upon patients arrival to unit and care assumed.

## 2017-09-07 NOTE — CONSULTS
[address] [facility] [facility] [address]   [address] [facility]       Name:  Jos Little   MR#:  [id number]   :  1950   Account #:  [de-identified]    Date of Consultation:  2017   Date of Adm:  2017       REQUESTING PHYSICIAN: Dr. Saintclair Mike. REASON FOR EVALUATION: Visual disturbance. HISTORY OF PRESENT ILLNESS: The patient is a 61-year-old   female with past medical history of breast cancer, osteoporosis, and   sarcoidosis, who was in her usual baseline state of health when about   6 days ago on 2017 while she was taking a shower and was   looking at a plain tile on the wall she noticed that she suddenly had a   big patch of darkness appear in almost 95% of her visual field of the   left eye. It came on in about 8 to 10 seconds and then quickly   disappeared. She called her ophthalmologist and saw them the next   day and her eye exam was negative. She was advised to see her PCP,   which she did see yesterday and while she was there she had the   same symptoms return in her right eye, after which she was sent in to   the hospital. She has floaters at her baseline in both eyes and every   now and then she will try to close one eye and check on her floaters. She states that she can reproduce the symptoms if she closes one eye   and stares at a plain, solid color/wall and she can recreate it in both   eyes. There were no other neurological symptoms including headache,   nausea, vomiting, changes in speech or focal motor sensory deficits in   the extremities. PAST MEDICAL HISTORY: As mentioned above. SOCIAL HISTORY: Nonsmoker. Drinks alcohol socially. ALLERGIES   1. BACTRIM. 2. CIPROFLOXACIN. HOME MEDICATIONS   1. Arimidex. 2. Prolia. PHYSICAL EXAMINATION   GENERAL: The patient is alert, fully oriented. VITAL SIGNS: Blood pressure 135/81, temperature 97.5, pulse was   67. NEUROLOGIC: Speech is clear.  Comprehension is normal. Pupils are equal, round, reactive. Extraocular movements are full. Face is   symmetric. Tongue is midline. Hearing is baseline. Muscle tone and   bulk is normal. Strength is normal in both upper and lower extremities. Deep tendon reflexes are 2/2 and symmetric. Toes are downgoing. Sensation is intact. Gait is normal.   HEART: Regular rate. CHEST: Clear. ABDOMEN: Soft, nontender, positive bowel sounds. EXTREMITIES: No edema. LABORATORY DATA: CBC is normal. Chemistries normal. Lipid   profile: Triglycerides 119, HDL 81, .2. IMAGING: MRI scan of the brain to my review does not show any   significant stenosis. Carotid duplex is negative for any hemodynamically significant   stenosis. ASSESSMENT AND PLAN: A 81-year-old female admitted with   symptoms of sudden, near-complete vision loss lasting a few seconds   in one eye, followed by another eye. Thereafter, the patient has been   able to reproduce this symptom by staring and covering the other eye. This is not strongly suggestive of transient ischemic attack (TIA) or   amaurosis fugax. In any case, she has had workup done in this regard   and it is negative. It will be reasonable to continue baby aspirin and   given her LDL greater than 140, we may consider a low-dose statin as   well. If the official MRI report is negative, she could be discharged   home. She should avoid doing what precipitates her symptoms. Please feel free to contact me with any further questions. Thank you   for this consultation.         Paradise Roy MD      AS / Jim Winters   D:  09/07/2017   18:55   T:  09/07/2017   19:19   Job #:  [id number]

## 2017-09-07 NOTE — ED NOTES
I personally saw and examined the patient. I have reviewed and agree with the MLP's findings, including all diagnostic interpretations, and plans as written. I was present during the key portions of separately billed procedures.     Starr Almazan MD

## 2017-09-07 NOTE — IP AVS SNAPSHOT
[address] 
[phone] Patient: William Corcoran MRN: [id number] Current Discharge Medication List  
  
START taking these medications Dose & Instructions Dispensing Information Comments Morning Noon Evening Bedtime  
 aspirin delayed-release 81 mg tablet Start taking on:  9/8/2017 Your last dose was: Your next dose is:    
   
   
 Dose:  81 mg Take 1 Tab by mouth daily. Quantity:  30 Tab Refills:  1 CONTINUE these medications which have NOT CHANGED Dose & Instructions Dispensing Information Comments Morning Noon Evening Bedtime  
 anastrozole 1 mg tablet Commonly known as:  ARIMIDEX Your last dose was: Your next dose is:    
   
   
 Dose:  1 mg Take 1 Tab by mouth daily. Quantity:  90 Tab Refills:  3 CALCIUM 500+D 500 mg(1,250mg) -200 unit per tablet Generic drug:  calcium-vitamin D Your last dose was: Your next dose is:    
   
   
 Dose:  1 Tab Take 1 Tab by mouth. Refills:  0  
     
   
   
   
  
 OTHER Your last dose was: Your next dose is:    
   
   
 Dose:  1 Cap Take 1 Cap by mouth daily. theracran HP daily Refills:  0 PROLIA 60 mg/mL injection Generic drug:  denosumab Your last dose was: Your next dose is:    
   
   
 Dose:  60 mg  
60 mg by SubCUTAneous route. Every six months Refills:  0  
     
   
   
   
  
 therapeutic multivitamin tablet Commonly known as:  [facility] Your last dose was: Your next dose is:    
   
   
 Dose:  1 Tab Take 1 Tab by mouth daily. Refills:  0 Where to Get Your Medications Information on where to get these meds will be given to you by the nurse or doctor. ! Ask your nurse or doctor about these medications aspirin delayed-release 81 mg tablet

## 2017-09-07 NOTE — PROGRESS NOTES
Admission Medication Reconciliation:    Information obtained from: patient, rx query    Significant PMH/Disease States:   Past Medical History:   Diagnosis Date    Avascular necrosis ([facility] age 45     right hip > replaced    [facility]) [facility] age 56       ()    left hip > replaced    Breast cancer ([facility] 2013    Ectopic pregnancy age 44     (26)    Nausea & vomiting     Osteoarthritis of finger(s) age 53       (36)    Osteoporosis age 51   (56)    Other and unspecified hyperlipidemia 2010    Proctitis     Sarcoidosis ([facility] age 30   (200)    entirely resolved        Chief Complaint for this Admission:  vision change    Allergies:  Bactrim [sulfamethoprim ds]; Ciprofloxacin; and Relafen [nabumetone]    Prior to Admission Medications:   Prior to Admission Medications   Prescriptions Last Dose Informant Patient Reported? Taking? Denosumab (PROLIA) 60 mg/mL injection 2017  Yes Yes   Si mg by SubCUTAneous route. Every six months   OTHER 2017  Yes Yes   Sig: Take 1 Cap by mouth daily. theracran HP daily    anastrozole (ARIMIDEX) 1 mg tablet 2017  No Yes   Sig: Take 1 Tab by mouth daily. calcium-vitamin D (CALCIUM 500+D) 500 mg(1,250mg) -200 unit per tablet 2017  Yes Yes   Sig: Take 1 Tab by mouth. therapeutic multivitamin [facility]) tablet 2017  Yes Yes   Sig: Take 1 Tab by mouth daily. Facility-Administered Medications: None         Comments/Recommendations: Spoke with patient who is a good historian. Removed: amoxicillin, keflex, and lorazepam. Confirmed allergies. No medications taken today. Patient noted that all injections and blood pressure readings must be done on her left side. Patient did not have any questions at this time.     Ti Garcia, PharmD  ED EXT [id number]

## 2017-09-07 NOTE — Clinical Note
Patient Class[de-identified] Observation [955] Type of Bed: Clinical Observation Unit [36] Reason for Observation: ? TIA Admitting Diagnosis: Changes in vision [3356659] Admitting Physician: Nishi Castillo [[id number]] Attending Physician: Tangela Tate

## 2017-09-07 NOTE — ED TRIAGE NOTES
Pt arrives via EMS from pcp office for bilateral vision changes onset 1 week. Pt denies extremity weakness, slurred speech, dizziness.

## 2017-09-07 NOTE — ED PROVIDER NOTES
HPI Comments: 78 y.o. female with past medical history significant for sarcoidosis, osteopetrosis, and dyslipidemia presents with complaints of monocular vision loss. The pt states that her symptoms began in her left eye 1 week ago. She was seen by opthalmology (Dr. Diane Mcdermott) who advised her to be worked up to r/o amaurosis fugax. The pt decided to go home AMA. The pt returned to her PCP office (Dr. Alfredo Ulrich) with complaints of monocular vision in the right eye. Dr. Alfredo Ulrich referred the pt to the ED for further workup. The pt denies ataxia, dysarthria, facial asymmetry, or unilateral weakness. There are no other acute medical complaints at this time. PCP: MD Ally Munoz PA-C    Patient is a 78 y.o. female presenting with visual disturbance. Vision Change    Pertinent negatives include no numbness, no discharge, no photophobia, no eye redness, no nausea, no vomiting, no fever, no pain and no dizziness. Past Medical History:   Diagnosis Date    Avascular necrosis [facility]) age 45     right hip > replaced    AVN (avascular necrosis of bone) ([facility] age 56       (1997)    left hip > replaced    Breast cancer ([facility] 5/21/2013    Ectopic pregnancy age 44     (26)    Nausea & vomiting     Osteoarthritis of finger(s) age 53       (36)    Osteoporosis age 51   (56)    Other and unspecified hyperlipidemia 7/6/2010    Proctitis     Sarcoidosis ([facility] age 30   (200)    entirely resolved 1986       Past Surgical History:   Procedure Laterality Date    BONE GRAFT FEMUR HEAD/NECK/RIDGE  age 56    (5)    Left Hip    BREAST SURGERY PROCEDURE UNLISTED      BREST BIOPIES X 4 WITH 1 MALIGNANT.     ENDOSCOPY, COLON, DIAGNOSTIC  age 55   (2006    told to repeat in 5 years Dr. Shree Wilkins LUMPECTOMY  5/22/2013    BREAST LUMPECTOMY WITH AXILLARY NODE DISSECTION performed by Nuria Washington MD at [facility] [address]  age 24      (26)   [address] age 51    (1999)    5th metatarsal left foot    HX HEENT  2012    LASER SURGERY FOR \"HOLES IN RT & TETACHED RETINA IN LT\"    HX HEENT      EXTRACTION OF WISDOM TEETH    HX HEENT  8/2015    vitrectomy    HX HYSTERECTOMY  age 40       (46)   Yimi Pagoda OOPHORECTOMY  age 41       (65)    RIGHT ONLY    HX ORTHOPAEDIC  2012    LEFT HIP REPLACEMENT    HX ORTHOPAEDIC  1997    LEFT BONE GRAFT DONE    HX ORTHOPAEDIC  1998    RIGHT HIP REVISION    HX ORTHOPAEDIC  2012    RIGHT HIP REVISION    HX REFRACTIVE SURGERY      HX TUBAL LIGATION  age 44      (26)    INCISE FINGER TENDON SHEATH   age 47    (65)    Trigger finger    PELVIS/HIP JOINT SURGERY UNLISTED  age 45     (36)    RIGHT HIP REPLACEMENT    MA REVISE TOTAL HIP REPLACEMENT  age 53     (56)    right hip    MA REVISE TOTAL HIP REPLACEMENT  age 65      (2012)    left hip    TREAT ECTOPIC PREG,NON REMVAL  age 44  (26)         Family History:   Problem Relation Age of Onset    Breast Cancer Mother 67     in situ    Cancer Mother 67     breast ca and esophageal    Heart defect Mother      murmur diag in 19's, never treated    Arthritis-osteo Mother      late 52's early 63's    Osteoporosis Mother 76    Other Mother 78     lupus - declared disease free in 2004    Arrhythmia Mother 80     pacemaker    Hypertension Father     Heart defect Father      murmur idagnosed in 19's    Kidney Disease Father 80     renal stent    High Cholesterol Father     Heart Disease Father      aortic stenosis    Osteoporosis Brother 47    Arthritis-osteo Brother 47    Other Brother      age 34 hypoglycemia & age 47 Raynaud    Heart Disease Maternal Grandfather      developed in 42's    Hypertension Paternal Grandfather     Kidney Disease Paternal Grandfather     Other Maternal Grandmother 71     amyotrophic lateral sclerosis (ALS)/ALS    Hypertension Brother 47    Other Brother 47     GERD    High Cholesterol Brother 47    Other Brother 62 Multilevel DDD    Other Brother 62     Spondylolithesis    Other Brother 62     Servin's Esophagus, Diverticulosis, Gastritis    Other Brother 62     Bladder neck obstruction, swollen adrenal glands, small vessel disease    Cancer Brother 61     Melanoma and Squamous Cell Carcinoma       Social History     Social History    Marital status:      Spouse name: N/A    Number of children: N/A    Years of education: N/A     Occupational History    Not on file. Social History Main Topics    Smoking status: Former Smoker     Packs/day: 0.25     Years: 6.00     Quit date: 1/1/1980    Smokeless tobacco: Never Used    Alcohol use 0.5 oz/week     1 Glasses of wine per week    Drug use: No    Sexual activity: Yes     Partners: Male     Birth control/ protection: None     Other Topics Concern    Not on file     Social History Narrative         ALLERGIES: Bactrim [sulfamethoprim ds]; Ciprofloxacin; and Relafen [nabumetone]    Review of Systems   Constitutional: Negative for activity change, appetite change, diaphoresis and fever. HENT: Negative for ear discharge, ear pain, facial swelling, hearing loss, rhinorrhea, sore throat, tinnitus, trouble swallowing and voice change. Eyes: Positive for visual disturbance. Negative for photophobia, pain, discharge and redness. Respiratory: Negative for cough, chest tightness, shortness of breath, wheezing and stridor. Cardiovascular: Negative for chest pain and palpitations. Gastrointestinal: Negative for abdominal pain, constipation, diarrhea, nausea and vomiting. Endocrine: Negative for polydipsia and polyuria. Genitourinary: Negative for dysuria, flank pain and hematuria. Musculoskeletal: Negative for arthralgias, back pain and myalgias. Skin: Negative for color change and rash. Neurological: Negative for dizziness, syncope, speech difficulty, light-headedness and numbness. Psychiatric/Behavioral: Negative for behavioral problems. Vitals:    09/07/17 1000 09/07/17 1015 09/07/17 1030 09/07/17 1045   BP:  167/65 180/75 177/63   Pulse: 71  65 65   Resp: 13  12 14   Temp:       SpO2: 100%  100% 100%   Weight:       Height:                Physical Exam   Constitutional: She is oriented to person, place, and time. She appears well-developed and well-nourished. HENT:   Head: Normocephalic and atraumatic. Eyes: Conjunctivae are normal. Pupils are equal, round, and reactive to light. Right eye exhibits no discharge. Left eye exhibits no discharge. IOP    Right Eye:   Left   Eye:   Neck: Normal range of motion. Neck supple. No thyromegaly present. Cardiovascular: Normal rate, regular rhythm and normal heart sounds. Exam reveals no gallop and no friction rub. No murmur heard. Pulmonary/Chest: Effort normal and breath sounds normal. No respiratory distress. She has no wheezes. Abdominal: Soft. Bowel sounds are normal. She exhibits no distension. There is no tenderness. There is no rebound and no guarding. Musculoskeletal: Normal range of motion. Neurological: She is alert and oriented to person, place, and time. Skin: Skin is warm. Psychiatric: She has a normal mood and affect. MDM  Number of Diagnoses or Management Options  Vision abnormalities:   Diagnosis management comments: Pt presents as referral from PCP (dr Ranjit Pratt) to be worked up. Spoke to Dr. Ranjit Pratt regarding normal workup. She advised the pt be admitted to the hospitalist service for echo and carotid doppler. Called and spoke with hospitalist who agreed to admit to service. Reviewed treatment plan with attending and they agree.   Puja Ash PA-C    ED Course       Procedures

## 2017-09-07 NOTE — DISCHARGE INSTRUCTIONS
Discharge Instructions       PATIENT ID: Brandi Alvarez  MRN: [id number]   YOB: 1950    DATE OF ADMISSION: 9/7/2017  9:41 AM    DATE OF DISCHARGE: 9/7/2017    PRIMARY CARE PROVIDER: Michael Brown MD     ATTENDING PHYSICIAN: Sydnie Nina MD  DISCHARGING PROVIDER: Sydnie Nina MD    To contact this individual call [phone] and ask the  to page. If unavailable ask to be transferred the Adult Hospitalist Department. DISCHARGE DIAGNOSES   Transient changes in vision    CONSULTATIONS: IP CONSULT TO PRIMARY CARE PROVIDER  IP CONSULT TO NEUROLOGY  IP CONSULT TO HOSPITALIST    PROCEDURES/SURGERIES: * No surgery found *    PENDING TEST RESULTS:   At the time of discharge the following test results are still pending: none    FOLLOW UP APPOINTMENTS:   Follow-up Information     Follow up With Details Comments Contact Info    Michael Brown MD In 1 week  [address]  [phone]             ADDITIONAL CARE RECOMMENDATIONS:   Follow up with PMD/Ophthalmology    DIET: Cardiac Diet    ACTIVITY: Activity as tolerated      DISCHARGE MEDICATIONS:   See Medication Reconciliation Form    · It is important that you take the medication exactly as they are prescribed. · Keep your medication in the bottles provided by the pharmacist and keep a list of the medication names, dosages, and times to be taken in your wallet. · Do not take other medications without consulting your doctor. NOTIFY YOUR PHYSICIAN FOR ANY OF THE FOLLOWING:   Fever over 101 degrees for 24 hours. Chest pain, shortness of breath, fever, chills, nausea, vomiting, diarrhea, change in mentation, falling, weakness, bleeding. Severe pain or pain not relieved by medications. Or, any other signs or symptoms that you may have questions about.       DISPOSITION:  x  Home With:   OT  PT  HH  RN       SNF/Inpatient Rehab/LTAC    Independent/assisted living    Hospice    Other:     CDMP Checked: Yes x     PROBLEM LIST Updated:  Yes x       Signed:   Riky Conway MD  9/7/2017  6:01 PM

## 2017-09-07 NOTE — PROCEDURES
[facility]  *** FINAL REPORT ***    Name: Edison Spencer  MRN: [id number]    Outpatient  : 10 Mar 1950  HIS Order #: [id number]  [id number] [facility] Visit #: [id number]  Date: 07 Sep 2017    TYPE OF TEST: Cerebrovascular Duplex    REASON FOR TEST  Transient ischemic attacks    Right Carotid:-             Proximal               Mid                 Distal  cm/s  Systolic  Diastolic  Systolic  Diastolic  Systolic  Diastolic  CCA:     88.4      17.0                            82.0      18.0  Bulb:  ECA:     77.0       8.0  ICA:     59.0      16.0                            68.0      25.0  ICA/CCA:  0.7       0.9    ICA Stenosis:    Right Vertebral:-  Finding: Antegrade  Sys:       65.0  Doris:       21.0    Right Subclavian:    Left Carotid:-            Proximal                Mid                 Distal  cm/s  Systolic  Diastolic  Systolic  Diastolic  Systolic  Diastolic  CCA:     69.9      14.0                            74.0      16.0  Bulb:  ECA:     77.0       9.0  ICA:     61.0      19.0                            70.0      22.0  ICA/CCA:  0.8       1.2    ICA Stenosis:    Left Vertebral:-  Finding: Antegrade  Sys:       59.0  Doris:       13.0    Left Subclavian:    INTERPRETATION/FINDINGS  PROCEDURE:  Color duplex ultrasound imaging of extracranial  cerebrovascular arteries. FINDINGS:       Right:   Internal carotid velocity is within normal limits. There is narrowing of the internal carotid flow channel on color  Doppler imaging and  non-calcific plaque on B-mode imaging, consistent   with less than 50 percent stenosis (lower portion of the 0 to 49  percent range). The common and external carotid arteries are patent  and without evidence of hemodynamically significant stenosis. Left: Internal carotid velocity is within normal limits.   There  is narrowing of the internal carotid flow channel on color Doppler  imaging and  non-calcific plaque on B-mode imaging, consistent with  less than 50 percent stenosis (lower portion of the 0 to 49 percent  range). The common and external carotid arteries are patent and  without evidence of hemodynamically significant stenosis. IMPRESSION:  Consistent with less than 50% stenosis of the right  internal carotid and less than 50% stenosis of the left internal  carotid. Vertebrals are patent with antegrade flow. ADDITIONAL COMMENTS    I have personally reviewed the data relevant to the interpretation of  this  study. TECHNOLOGIST: Breezy Mcnamara.  Allyson Alanis  Signed: 09/07/2017 02:16 PM    PHYSICIAN: Slime To MD  Signed: 09/08/2017 08:15 AM

## 2017-09-07 NOTE — ED NOTES
Pt 20G IV in L AC is patent and saline locked. Site is clean dry and intact. VSS. O2 sats 100% on RA. Pt reports pain 0/10.  Pt in stable condition at the time of transport inpatient

## 2017-09-07 NOTE — IP AVS SNAPSHOT
[address] 
[phone] Patient: Kaleen Hodgkin MRN: [id number] KMO:9/60/1964 You are allergic to the following Allergen Reactions Bactrim (Sulfamethoprim Ds) Other (comments) Skin reaction Ciprofloxacin Rash Relafen (Nabumetone) Swelling Facial Swelling Recent Documentation Height Weight BMI OB Status Smoking Status 1.524 m 47.9 kg 20.62 kg/m2 Hysterectomy Former Smoker Emergency Contacts Name Discharge Info Relation Home Work Mobile Juan M Cardoza  Spouse [3] [phone] About your hospitalization You were admitted on:  September 7, 2017 You last received care in the:  [facility] 5 OBSERVATION You were discharged on:  September 7, 2017 Unit phone number:  [phone] Why you were hospitalized Your primary diagnosis was:  Changes In Vision Providers Seen During Your Hospitalizations Provider Role Specialty Primary office phone Jose Michel MD Attending Provider Emergency Medicine [phone] Mahendra Fraire MD Attending Provider Internal Medicine [phone] Your Primary Care Physician (PCP) Primary Care Physician Office Phone Office Fax [address] [phone] Follow-up Information Follow up With Details Comments Contact Info Siomara Manjarrez MD In 1 week  330 [facility] [address] [place] [address] 
[phone] Current Discharge Medication List  
  
START taking these medications Dose & Instructions Dispensing Information Comments Morning Noon Evening Bedtime  
 aspirin delayed-release 81 mg tablet Start taking on:  9/8/2017 Your last dose was: Your next dose is:    
   
   
 Dose:  81 mg Take 1 Tab by mouth daily. Quantity:  30 Tab Refills:  1 CONTINUE these medications which have NOT CHANGED Dose & Instructions Dispensing Information Comments Morning Noon Evening Bedtime  
 anastrozole 1 mg tablet Commonly known as:  ARIMIDEX Your last dose was: Your next dose is:    
   
   
 Dose:  1 mg Take 1 Tab by mouth daily. Quantity:  90 Tab Refills:  3 CALCIUM 500+D 500 mg(1,250mg) -200 unit per tablet Generic drug:  calcium-vitamin D Your last dose was: Your next dose is:    
   
   
 Dose:  1 Tab Take 1 Tab by mouth. Refills:  0  
     
   
   
   
  
 OTHER Your last dose was: Your next dose is:    
   
   
 Dose:  1 Cap Take 1 Cap by mouth daily. theracran HP daily Refills:  0 PROLIA 60 mg/mL injection Generic drug:  denosumab Your last dose was: Your next dose is:    
   
   
 Dose:  60 mg  
60 mg by SubCUTAneous route. Every six months Refills:  0  
     
   
   
   
  
 therapeutic multivitamin tablet Commonly known as:  [facility] Your last dose was: Your next dose is:    
   
   
 Dose:  1 Tab Take 1 Tab by mouth daily. Refills:  0 Where to Get Your Medications Information on where to get these meds will be given to you by the nurse or doctor. ! Ask your nurse or doctor about these medications  
  aspirin delayed-release 81 mg tablet Discharge Instructions Discharge Instructions PATIENT ID: William Coreas MRN: [id number] YOB: 1950 DATE OF ADMISSION: 9/7/2017  9:41 AM   
DATE OF DISCHARGE: 9/7/2017 PRIMARY CARE PROVIDER: Loan Abebe MD  
 
ATTENDING PHYSICIAN: Ruma Lockhart MD 
DISCHARGING PROVIDER: Ruma Lockhart MD   
To contact this individual call [phone] and ask the  to page. If unavailable ask to be transferred the Adult Hospitalist Department. DISCHARGE DIAGNOSES Transient changes in vision CONSULTATIONS: IP CONSULT TO PRIMARY CARE PROVIDER 
IP CONSULT TO NEUROLOGY 
IP CONSULT TO HOSPITALIST 
 
PROCEDURES/SURGERIES: * No surgery found * PENDING TEST RESULTS:  
At the time of discharge the following test results are still pending: none FOLLOW UP APPOINTMENTS:  
Follow-up Information Follow up With Details Comments Contact Candy Sotelo MD In 1 week  330 [facility] [address] 
[phone] ADDITIONAL CARE RECOMMENDATIONS:  
Follow up with PMD/Ophthalmology DIET: Cardiac Diet ACTIVITY: Activity as tolerated DISCHARGE MEDICATIONS: 
 See Medication Reconciliation Form · It is important that you take the medication exactly as they are prescribed. · Keep your medication in the bottles provided by the pharmacist and keep a list of the medication names, dosages, and times to be taken in your wallet. · Do not take other medications without consulting your doctor. NOTIFY YOUR PHYSICIAN FOR ANY OF THE FOLLOWING:  
Fever over 101 degrees for 24 hours. Chest pain, shortness of breath, fever, chills, nausea, vomiting, diarrhea, change in mentation, falling, weakness, bleeding. Severe pain or pain not relieved by medications. Or, any other signs or symptoms that you may have questions about. DISPOSITION: 
x  Home With: 
 OT  PT  [facility]  RN  
  
 SNF/Inpatient Rehab/LTAC Independent/assisted living Hospice Other: CDMP Checked:  
Yes x PROBLEM LIST Updated: 
Yes x Signed:  
Jose Mccartney MD 
9/7/2017 
6:01 PM 
 
Discharge Orders None ACO Transitions of Care Introducing [employer] [address] Berg offers a voluntary care coordination program to provide high quality service and care to [facility] fee-for-service beneficiaries. Vanessa Miller was designed to help you enhance your health and well-being through the following services: ? Transitions of Care  support for individuals who are transitioning from one care setting to another (example: Hospital to home). ? Chronic and Complex Care Coordination  support for individuals and caregivers of those with serious or chronic illnesses or with more than one chronic (ongoing) condition and those who take a number of different medications. If you meet specific medical criteria, a [facility] [address] may call you directly to coordinate your care with your primary care physician and your other care providers. For questions about the [facility] programs, please, contact your physicians office. For general questions or additional information about Accountable Care Organizations: 
Please visit www.medicare.gov/acos. html or call 1-800-MEDICARE (8-820.785.3214) TTY users should call 6-118.298.8114. [employer] Announcement We are excited to announce that we are making your provider's discharge notes available to you in [employer]. You will see these notes when they are completed and signed by the physician that discharged you from your recent hospital stay. If you have any questions or concerns about any information you see in [employer], please call the Health Information Department where you were seen or reach out to your Primary Care Provider for more information about your plan of care. Introducing [facility]! Dear Lexii Fregoso: 
Thank you for requesting a [employer] account. Our records indicate that you already have an active [employer] account. You can access your account anytime at [email or web address] Did you know that you can access your hospital and ER discharge instructions at any time in [employer]? You can also review all of your test results from your hospital stay or ER visit. Additional Information If you have questions, please visit the Frequently Asked Questions section of the [employer] website at [email or web address] Remember, MyChart is NOT to be used for urgent needs. For medical emergencies, dial 911. Now available from your iPhone and Android! General Information Please provide this summary of care documentation to your next provider. Patient Signature:  ____________________________________________________________ Date:  ____________________________________________________________  
  
Gearldine Moulds Provider Signature:  ____________________________________________________________ Date:  ____________________________________________________________

## 2017-09-07 NOTE — DISCHARGE SUMMARY
Discharge Summary       PATIENT ID: Yoandy Gerardo  MRN: [id number]   YOB: 1950    DATE OF ADMISSION: 9/7/2017  9:41 AM    DATE OF DISCHARGE: 9/7/2017   PRIMARY CARE PROVIDER: Geovanna Vang MD     ATTENDING PHYSICIAN: Dr Tamara Barrios  DISCHARGING PROVIDER: Tamara Barrios MD    To contact this individual call [phone] and ask the  to page. If unavailable ask to be transferred the Adult Hospitalist Department. CONSULTATIONS: IP CONSULT TO PRIMARY CARE PROVIDER  IP CONSULT TO NEUROLOGY  IP CONSULT TO HOSPITALIST    PROCEDURES/SURGERIES: * No surgery found *    ADMITTING DIAGNOSES & HOSPITAL COURSE:   78 yr old female with pmh of osteoporosis, history of avascular necrosis of hip bone, breast cancer s/p lobectomy on arimidex comes over here with transient changes in vision    1. Transient changes in vision. Appreciate rec from Neuro. CT head unremarkable, Carotids benign, Echo EF 55-60% with no RWMA, no PFO. MRI awaited, if MRI is normal, will discharge the patient on ASA. Outpatient follow up with PMD    2. History of breast cancer s/p lobectomy. Outpatient follow up with Dr Abraham Coronado    3. Osteoporosis on Prolia. Follow up with PMD    4. Probable HTN. May need to address at her PMD about whether to start the patient on antihypertensive    5. Hyperlipidemia Again would leave it up to PMD.    Cardiac diet        DISCHARGE DIAGNOSES / PLAN:      1.   Transient changes in vision       PENDING TEST RESULTS:   At the time of discharge the following test results are still pending: none    FOLLOW UP APPOINTMENTS:    Follow-up Information     Follow up With Details Comments [address], [place] In 1 week  [facility] Alma Karimi  [address] [place] [address]  [phone]             ADDITIONAL CARE RECOMMENDATIONS:   BP checks twice daily  Follow up with PMD in 1 week    DIET: Cardiac Diet    ACTIVITY: Activity as tolerated      DISCHARGE MEDICATIONS:  Current Discharge Medication List START taking these medications    Details   aspirin delayed-release 81 mg tablet Take 1 Tab by mouth daily. Qty: 30 Tab, Refills: 1         CONTINUE these medications which have NOT CHANGED    Details   therapeutic multivitamin (THERAGRAN) tablet Take 1 Tab by mouth daily. anastrozole (ARIMIDEX) 1 mg tablet Take 1 Tab by mouth daily. Qty: 90 Tab, Refills: 3      OTHER Take 1 Cap by mouth daily. theracran HP daily       Denosumab (PROLIA) 60 mg/mL injection 60 mg by SubCUTAneous route. Every six months      calcium-vitamin D (CALCIUM 500+D) 500 mg(1,250mg) -200 unit per tablet Take 1 Tab by mouth. NOTIFY YOUR PHYSICIAN FOR ANY OF THE FOLLOWING:   Fever over 101 degrees for 24 hours. Chest pain, shortness of breath, fever, chills, nausea, vomiting, diarrhea, change in mentation, falling, weakness, bleeding. Severe pain or pain not relieved by medications. Or, any other signs or symptoms that you may have questions about.     DISPOSITION:  x  Home With:   OT  PT  HH  RN       Long term SNF/Inpatient Rehab    Independent/assisted living    Hospice    Other:       PATIENT CONDITION AT DISCHARGE:     Functional status    Poor     Deconditioned    x Independent      Cognition   xx  Lucid     Forgetful     Dementia      Catheters/lines (plus indication)    Olmos     PICC     PEG    x None      Code status   x  Full code     DNR      PHYSICAL EXAMINATION AT DISCHARGE:  Please see progress note      CHRONIC MEDICAL DIAGNOSES:  Problem List as of 9/7/2017  Date Reviewed: 9/7/2017          Codes Class Noted - Resolved    * (Principal)Changes in vision ICD-10-CM: H53.9  ICD-9-CM: 368.9  9/7/2017 - Present        Anxiety about health ICD-10-CM: F41.8  ICD-9-CM: 300.09  5/23/2017 - Present        Vaginal dryness, menopausal ICD-10-CM: N95.1  ICD-9-CM: 627.2  11/10/2016 - Present        Allergic cough ICD-10-CM: R05  ICD-9-CM: 786.2  10/18/2016 - Present        Anxiety ICD-10-CM: F41.9  ICD-9-CM: 300.00 1/15/2015 - Present        Aromatase inhibitor use ICD-10-CM: Z79.811  ICD-9-CM: V07.52  8/4/2014 - Present        S/P lumpectomy, right breast ICD-10-CM: Z90.11  ICD-9-CM: V45.89  6/14/2013 - Present        Breast cancer ([facility].) ICD-10-CM: C50.919  ICD-9-CM: 174.9 Stage 1 5/21/2013 - Present        Mechanical complication of hip prosthesis ([facility].) ICD-10-CM: A51.028R, Z96.649  ICD-9-CM: 996.47, V43.64  10/3/2012 - Present        Avascular necrosis of femur head (Chronic) ICD-9-CM: 733.42  1/4/2012 - Present        Hyperlipidemia ICD-10-CM: E78.5  ICD-9-CM: 272.4  7/6/2010 - Present        Osteoporosis ICD-10-CM: M81.0  ICD-9-CM: 733.00  7/6/2010 - Present    Overview Signed 3/19/2013 10:51 AM by Dyllan Mcleod MD     Declines medication at this time and [place] [facility]              RESOLVED: Anxiety ICD-10-CM: F41.9  ICD-9-CM: 300.00  4/3/2014 - 5/16/2014              Greater than 37 minutes were spent with the patient on counseling and coordination of care    Signed:   Rae Landin MD  9/7/2017  6:02 PM

## 2017-09-07 NOTE — PROGRESS NOTES
HPI:  Martinez Mar is a 78y.o. year old female who returns to clinic today for an acute visit: for vision changes at request of opthalmology. On 9/1/17 had episode where her vision went black in left eye for 10-15 seconds covering 95% of field of vision. She would only note this if she closed her right eye. She has continued to have these symptoms. She denies any problems in her left vision until she came to the office today. Now on covering her right eye she notes partial loss of her left vision which comes and goes. She was seen by Dr Yang Melton in opthalmology 9/2/17 for the episode of vision loss which was felt to possibly be amarosis fugax. No abnormalities were found on her eye exam.  She is referred here for further evaluation. Note from Dr Charisse Heard is reviewed. Current Outpatient Prescriptions   Medication Sig Dispense Refill    amoxicillin (AMOXIL) 500 mg capsule Prior to procedure      cephALEXin (KEFLEX) 250 mg capsule as needed.  LORazepam (ATIVAN) 1 mg tablet Take 1mg lorazepam 30 minutes prior to MRI. Is not a maintenance drug. 1 Tab 0    anastrozole (ARIMIDEX) 1 mg tablet Take 1 Tab by mouth daily. 90 Tab 3    OTHER theracran HP daily      Denosumab (PROLIA) 60 mg/mL injection 60 mg by SubCUTAneous route.  MULTIVITAMINS (MULTIVITAMIN PO) Take  by mouth daily.  calcium-vitamin D (CALCIUM 500+D) 500 mg(1,250mg) -200 unit per tablet Take 1 Tab by mouth.        Allergies   Allergen Reactions    Bactrim [Sulfamethoprim Ds] Other (comments)     Skin reaction    Ciprofloxacin Rash    Relafen [Nabumetone] Swelling     Facial Swelling     Past Medical History:   Diagnosis Date    Avascular necrosis [facility]) age 45     right hip > replaced    AVN (avascular necrosis of bone) ([facility] [place] [address] age 56       (1997)    left hip > replaced    Breast cancer ([facility] [address] 5/21/2013    Ectopic pregnancy age 44     (26)    Nausea & vomiting     Osteoarthritis of finger(s) age 53       (36)    Osteoporosis age 51   (1999)    Other and unspecified hyperlipidemia 7/6/2010    Proctitis     Sarcoidosis ([facility] [address] age 30   (200)    entirely resolved 1986     Past Surgical History:   Procedure Laterality Date    BONE GRAFT FEMUR HEAD/NECK/RIDGE  age 56    (5)    Left Hip    BREAST SURGERY PROCEDURE UNLISTED      BREST BIOPIES X 4 WITH 1 MALIGNANT.     ENDOSCOPY, COLON, DIAGNOSTIC  age 55   (2006    told to repeat in 5 years Dr. Myles Oswald LUMPECTOMY  5/22/2013    BREAST LUMPECTOMY WITH AXILLARY NODE DISSECTION performed by Tuyet Troy MD at [address]  age 24      (26)    HX FRACTURE TX  age 51    (56)    5th metatarsal left foot    HX HEENT  2012    LASER SURGERY FOR \"HOLES IN RT & TETACHED RETINA IN LT\"    HX HEENT      EXTRACTION OF WISDOM TEETH    HX HEENT  8/2015    vitrectomy    HX HYSTERECTOMY  age 40       (46)   [de-identified] OOPHORECTOMY  age 41       (65)    RIGHT ONLY    HX ORTHOPAEDIC  2012    LEFT HIP REPLACEMENT    HX ORTHOPAEDIC  1997    LEFT BONE GRAFT DONE    HX ORTHOPAEDIC  1998    RIGHT HIP REVISION    HX ORTHOPAEDIC  2012    RIGHT HIP REVISION    HX REFRACTIVE SURGERY      HX TUBAL LIGATION  age 44      (26)    INCISE FINGER TENDON SHEATH   age 47    (65)    Trigger finger    PELVIS/HIP JOINT SURGERY UNLISTED  age 45     (36)    RIGHT HIP REPLACEMENT    [place] REVISE TOTAL HIP REPLACEMENT  age 53     (56)    right hip    [place] REVISE TOTAL HIP REPLACEMENT  age 65      (2012)    left hip    TREAT ECTOPIC PREG,NON REMVAL  age 44  (26)      Family History   Problem Relation Age of Onset   [address] Breast Cancer Mother 67     in situ    Cancer Mother 67     breast ca and esophageal    Heart defect Mother      murmur diag in 19's, never treated    Arthritis-osteo Mother      late 52's early 63's    Osteoporosis Mother 76    Other Mother 78     lupus - declared disease free in 2004    Arrhythmia Mother 80     pacemaker    Hypertension Father     Heart defect Father      murmur idagnosed in 19's    Kidney Disease Father 80     renal stent    High Cholesterol Father     Heart Disease Father      aortic stenosis    Osteoporosis Brother 47    Arthritis-osteo Brother 47    Other Brother      age 34 hypoglycemia & age 47 Raynaud    Heart Disease Maternal Grandfather      developed in 42's    Hypertension Paternal Grandfather     Kidney Disease Paternal Grandfather     Other Maternal Grandmother 71     amyotrophic lateral sclerosis (ALS)/ALS    Hypertension Brother 47    Other Brother 47     GERD    High Cholesterol Brother 47    Other Brother 62     Multilevel DDD    Other Brother 62     Spondylolithesis    Other Brother 62     Servin's Esophagus, Diverticulosis, Gastritis    Other Brother 62     Bladder neck obstruction, swollen adrenal glands, small vessel disease    Cancer Brother 61     Melanoma and Squamous Cell Carcinoma      Social History   Substance Use Topics    Smoking status: Former Smoker     Packs/day: 0.25     Years: 6.00     Quit date: 1/1/1980    Smokeless tobacco: Never Used    Alcohol use 0.5 oz/week     1 Glasses of wine per week         Review of Systems   Constitutional: Negative for chills, fever and malaise/fatigue. HENT: Negative for ear discharge and ear pain. Eyes: Negative for pain and discharge. Respiratory: Negative for shortness of breath. Cardiovascular: Negative for chest pain and leg swelling. Gastrointestinal: Negative for abdominal pain and heartburn. Neurological: Negative for dizziness, sensory change, speech change, focal weakness and headaches. Physical Exam   Constitutional: She appears well-developed. No distress. /70 (BP 1 Location: Left arm, BP Patient Position: Sitting)  Pulse (!) 56  Temp 97.6 °F (36.4 °C) (Oral)   Resp 16  Ht 5' 0.5\" (1.537 m)  Wt 105 lb 3.2 oz (47.7 kg)  SpO2 99%  BMI 20.21 kg/m2   HENT:   Head: Normocephalic and atraumatic. Right Ear: Tympanic membrane and ear canal normal.   Left Ear: Tympanic membrane and ear canal normal.   Nose: Nose normal.   Mouth/Throat: Oropharynx is clear and moist.   Eyes: Conjunctivae and EOM are normal. Pupils are equal, round, and reactive to light.    fundi sharp bilaterally. Neck: Carotid bruit is not present. No thyromegaly present. Cardiovascular: Normal rate, regular rhythm, normal heart sounds and intact distal pulses. No murmur heard. Pulmonary/Chest: Effort normal and breath sounds normal. She has no wheezes. Abdominal: Soft. Bowel sounds are normal. She exhibits no distension and no mass. There is no tenderness. Musculoskeletal: She exhibits no edema. Lymphadenopathy:     She has no cervical adenopathy. Neurological: She has normal strength. No cranial nerve deficit or sensory deficit. She exhibits normal muscle tone. Gait normal.   Psychiatric: She has a normal mood and affect. Vitals reviewed. Assessment & Plan:    ICD-10-CM ICD-9-CM    1. Vision loss, bilateral  During visit Rebecca Connell developed episodic vision loss in her right eye also. More urgent evaluation deemed necessary. Pt decline EMS and states will have her  take her. Reviewed with her importance of timely evaluation if this is a TIA or CVA. She states her understanding but again declines EMS. Lab work and imaging studies that were ordered earlier are canceled as pt will have evaluation in ER. H54.3 369.3    2. Vision changes H53.9 368.9 AMB POC EKG ROUTINE W/ 12 LEADS, INTER & REP                                       3. Pure hypercholesterolemia E78.00 272.0          Follow-up Disposition:  Return if symptoms worsen or fail to improve. Advised her to call back or return to office if symptoms worsen/change/persist.  Discussed expected course/resolution/complications of diagnosis in detail with patient. Medication risks/benefits/costs/interactions/alternatives discussed with patient.   She was given an after visit summary which includes diagnoses, current medications, & vitals. She expressed understanding with the diagnosis and plan.

## 2017-09-07 NOTE — PROGRESS NOTES
Bedside, Verbal and Written shift change report given to Suze (oncoming nurse) by Osei Meeks (offgoing nurse). Report included the following information SBAR, Kardex, MAR and Recent Results. I have reviewed discharge instructions with the patient. The patient verbalized understanding. All questions were answered. Patient discharged in stable condition with belongings.

## 2017-09-07 NOTE — PROGRESS NOTES
physical Therapy EVALUATION/DISCHARGE  Patient: Gabbie Patel (12 y.o. female)  Date: 9/7/2017  Primary Diagnosis: Changes in vision  Changes in vision        Precautions:      ASSESSMENT :  Based on the objective data described below, the patient presents with good balance and baseline level of functional mobility. Patient completely independent and active PTA, says she was on her way to the gym this morning when this happened. Patient ambulated 300 ft without AD with steady gait. Patient reports visual deficits only when she squints one eye, but otherwise unremarkable when mobilizing. Patient does not require skilled PT; orders completed. Cleared for discharge home without f/u services. Skilled physical therapy is not indicated at this time. PLAN :  Discharge Recommendations: None  Further Equipment Recommendations for Discharge: none     SUBJECTIVE:   Patient stated This wasn't on the agenda to be here today. I was on my way to the gym when this all happened.     OBJECTIVE DATA SUMMARY:   HISTORY:    Past Medical History:   Diagnosis Date    Avascular necrosis (HCC) age 45     right hip > replaced    AVN (avascular necrosis of bone) ([address] age 56       (1997)    left hip > replaced    Breast cancer ([address] 5/21/2013    Ectopic pregnancy age 44     (26)    Nausea & vomiting     Osteoarthritis of finger(s) age 53       (36)    Osteoporosis age 51   (56)    Other and unspecified hyperlipidemia 7/6/2010    Proctitis     Sarcoidosis ([address] age 30   (200)    entirely resolved 1986     Past Surgical History:   Procedure Laterality Date    BONE GRAFT FEMUR HEAD/NECK/RIDGE  age 56    (5)    Left Hip    BREAST SURGERY PROCEDURE UNLISTED      BREST BIOPIES X 4 WITH 1 MALIGNANT.     ENDOSCOPY, COLON, DIAGNOSTIC  age 55   (2006    told to repeat in 5 years Dr. Castillo Mejia LUMPECTOMY  5/22/2013    BREAST LUMPECTOMY WITH AXILLARY NODE DISSECTION performed by [facility]un [facility]    HX DILATION AND CURETTAGE  age 24      (26)   Denver Rapp HX FRACTURE TX  age 51    (56)    5th metatarsal left foot    HX HEENT  2012    LASER SURGERY FOR \"HOLES IN RT & TETACHED RETINA IN LT\"    HX HEENT      EXTRACTION OF WISDOM TEETH    HX HEENT  8/2015    vitrectomy    HX HYSTERECTOMY  age 40       (46)   [de-identified] OOPHORECTOMY  age 41       (65)    RIGHT ONLY    HX ORTHOPAEDIC  2012    LEFT HIP REPLACEMENT    HX ORTHOPAEDIC  1997    LEFT BONE GRAFT DONE    HX ORTHOPAEDIC  1998    RIGHT HIP REVISION    HX ORTHOPAEDIC  2012    RIGHT HIP REVISION    HX REFRACTIVE SURGERY      HX TUBAL LIGATION  age 44      (26)    INCISE FINGER TENDON SHEATH   age 47    (65)    Trigger finger    PELVIS/HIP JOINT SURGERY UNLISTED  age 45     (36)    RIGHT HIP REPLACEMENT    [place] REVISE TOTAL HIP REPLACEMENT  age 53     (56)    right hip    GA REVISE TOTAL HIP REPLACEMENT  age 65      (2012)    left hip    TREAT ECTOPIC PREG,NON REMVAL  age 44  (26)     Prior Level of Function/Home Situation: independent, active, lives with spouse, goes to gym  Personal factors and/or comorbidities impacting plan of care:     Home Situation  Home Environment: Private residence  # Steps to Enter: 4  Rails to Enter: Yes  One/Two Story Residence: Two story  # of Interior Steps: 15  Living Alone: No  Support Systems: Spouse/Significant Other/Partner  Patient Expects to be Discharged to[de-identified] Private residence  Current DME Used/Available at Home: None    EXAMINATION/PRESENTATION/DECISION MAKING:   Critical Behavior:  Neurologic State: Alert  Orientation Level: Oriented X4  Cognition: Appropriate decision making, Appropriate for age attention/concentration, Appropriate safety awareness  Safety/Judgement: Awareness of environment, Insight into deficits  Hearing:   Auditory  Auditory Impairment: None  Skin:  Intact   Edema: none   Range Of Motion:  AROM: Within functional limits           PROM: Within functional limits Strength:    Strength: Within functional limits                    Tone & Sensation:   Tone: Normal              Sensation: Intact               Coordination:  Coordination: Within functional limits  Vision:      Functional Mobility:  Bed Mobility:  Rolling: Independent  Supine to Sit: Independent  Sit to Supine: Independent  Scooting: Independent  Transfers:  Sit to Stand: Independent  Stand to Sit: Independent                       Balance:   Sitting: Intact; Without support  Standing: Intact; Without support  Ambulation/Gait Training:  Distance (ft): 300 Feet (ft)  Assistive Device: Gait belt  Ambulation - Level of Assistance: Independent     Gait Description (WDL): Exceptions to WDL           Base of Support: Narrowed               Functional Measure:  Barthel Index:    Bathin  Bladder: 10  Bowels: 10  Groomin  Dressing: 10  Feeding: 10  Mobility: 15  Stairs: 10  Toilet Use: 10  Transfer (Bed to Chair and Back): 15  Total: 100       Barthel and G-code impairment scale:  Percentage of impairment CH  0% CI  1-19% CJ  20-39% CK  40-59% CL  60-79% CM  80-99% CN  100%   Barthel Score 0-100 100 99-80 79-60 59-40 20-39 1-19   0   Barthel Score 0-20 20 17-19 13-16 9-12 5-8 1-4 0      The Barthel ADL Index: Guidelines  1. The index should be used as a record of what a patient does, not as a record of what a patient could do. 2. The main aim is to establish degree of independence from any help, physical or verbal, however minor and for whatever reason. 3. The need for supervision renders the patient not independent. 4. A patient's performance should be established using the best available evidence. Asking the patient, friends/relatives and nurses are the usual sources, but direct observation and common sense are also important. However direct testing is not needed. 5. Usually the patient's performance over the preceding 24-48 hours is important, but occasionally longer periods will be relevant.   6. Middle categories imply that the patient supplies over 50 per cent of the effort. 7. Use of aids to be independent is allowed. Karen Rosado., Barthel, AMINA ([id number]). Functional evaluation: the Barthel Index. [address] (14)2. ANDREW Nicolas, Tae Harper., Audrey Angelo, Tucker Pickens, [address] (1999). Measuring the change indisability after inpatient rehabilitation; comparison of the responsiveness of the Barthel Index and Functional [place] Measure. Journal of Neurology, Neurosurgery, and Psychiatry, 66(4), 066-155. NELI Michele.A, MICHELLE Maldonado, & Vandana Obrien M.A. (2004.) Assessment of post-stroke quality of life in cost-effectiveness studies: The usefulness of the Barthel Index and the EuroQoL-5D. Quality of Life Research, 13, 179-60       G codes: In compliance with CMSs Claims Based Outcome Reporting, the following G-code set was chosen for this patient based on their primary functional limitation being treated: The outcome measure chosen to determine the severity of the functional limitation was the Barthel Index with a score of 100/100 which was correlated with the impairment scale. ? Mobility - Walking and Moving Around:     - CURRENT STATUS: CH - 0% impaired, limited or restricted    - GOAL STATUS: CH - 0% impaired, limited or restricted    - D/C STATUS:  CH - 0% impaired, limited or restricted           Pain:Pain Scale 1: Numeric (0 - 10)  Pain Intensity 1: 0     Activity Tolerance:   VSS  Please refer to the flowsheet for vital signs taken during this treatment. After treatment:   []   Patient left in no apparent distress sitting up in chair  [x]   Patient left in no apparent distress in bed  [x]   Call bell left within reach  [x]   Nursing notified  []   Caregiver present  []   Bed alarm activated    COMMUNICATION/EDUCATION:   Communication/Collaboration:  [x]   Fall prevention education was provided and the patient/caregiver indicated understanding.   [x] Patient/family have participated as able and agree with findings and recommendations. []   Patient is unable to participate in plan of care at this time.   Findings and recommendations were discussed with: Registered Nurse    Thank you for this referral.  Tommie Elizabeth, PT   Time Calculation: 14 mins

## 2017-09-15 ENCOUNTER — TELEPHONE (OUTPATIENT)
Dept: INTERNAL MEDICINE CLINIC | Age: 67
End: 2017-09-15

## 2017-09-15 NOTE — TELEPHONE ENCOUNTER
Patient wants to know if she still needs to keep tues appt with Dr. Rebeca Matt. She just spoke with her opthalmologist and he said everything is fine. Since pt has an appt for a physical in October with Dr. Rebeca Matt, does she really need to come next Tues for a f/u? Please call today to let her know.

## 2017-09-15 NOTE — TELEPHONE ENCOUNTER
Called pt and informed her that she does not need to come in for appt next week but to keep her appt for Oct.

## 2017-10-24 ENCOUNTER — OFFICE VISIT (OUTPATIENT)
Dept: INTERNAL MEDICINE CLINIC | Age: 67
End: 2017-10-24

## 2017-10-24 VITALS
DIASTOLIC BLOOD PRESSURE: 74 MMHG | HEART RATE: 64 BPM | SYSTOLIC BLOOD PRESSURE: 110 MMHG | BODY MASS INDEX: 20.65 KG/M2 | OXYGEN SATURATION: 98 % | HEIGHT: 60 IN | TEMPERATURE: 97.6 F | WEIGHT: 105.2 LBS | RESPIRATION RATE: 16 BRPM

## 2017-10-24 DIAGNOSIS — Z00.00 INITIAL MEDICARE ANNUAL WELLNESS VISIT: Primary | ICD-10-CM

## 2017-10-24 DIAGNOSIS — Z23 ENCOUNTER FOR IMMUNIZATION: ICD-10-CM

## 2017-10-24 DIAGNOSIS — Z13.39 SCREENING FOR ALCOHOLISM: ICD-10-CM

## 2017-10-24 DIAGNOSIS — M81.0 OSTEOPOROSIS, UNSPECIFIED OSTEOPOROSIS TYPE, UNSPECIFIED PATHOLOGICAL FRACTURE PRESENCE: ICD-10-CM

## 2017-10-24 DIAGNOSIS — E78.00 PURE HYPERCHOLESTEROLEMIA: ICD-10-CM

## 2017-10-24 DIAGNOSIS — C50.911 MALIGNANT NEOPLASM OF RIGHT FEMALE BREAST, UNSPECIFIED ESTROGEN RECEPTOR STATUS, UNSPECIFIED SITE OF BREAST (HCC): ICD-10-CM

## 2017-10-24 PROBLEM — F41.8 ANXIETY ABOUT HEALTH: Status: RESOLVED | Noted: 2017-05-23 | Resolved: 2017-10-24

## 2017-10-24 PROBLEM — Z85.3 HISTORY OF RIGHT BREAST CANCER: Status: ACTIVE | Noted: 2017-10-24

## 2017-10-24 PROBLEM — H53.9 CHANGES IN VISION: Status: RESOLVED | Noted: 2017-09-07 | Resolved: 2017-10-24

## 2017-10-24 NOTE — PROGRESS NOTES
This is an Initial Medicare Annual Wellness Exam (AWV) (Performed 12 months after IPPE or effective date of Medicare Part B enrollment, Once in a lifetime)    I have reviewed the patient's medical history in detail and updated the computerized patient record. She is also here for follow up:   1. Cardiovascular: hypercholesterolemia . Diet and Lifestyle: generally follows a low fat low cholesterol diet, generally follows a low sodium diet. Exercise: 3 days a week wt lifting and 5-7 days of cardio. 2. History of right breast cancer diagnosed 4 1/2 years ago and followed by Dr Giovanni Alfred. No recurrence. tolerating arimidex. 3. Osteoporosis: followed by Dr Dc Weathers at [facility] and taking calcium and vit D. Taking medication: prolia. History     Past Medical History:   Diagnosis Date    Avascular necrosis [facility]) age 45     right hip > replaced    AVN (avascular necrosis of bone) ([facility] age 56       (1997)    left hip > replaced    Breast cancer ([facility] 5/21/2013    Ectopic pregnancy age 44     (26)    Nausea & vomiting     Osteoarthritis of finger(s) age 53       (36)    Osteoporosis age 51   (56)   Josef Luria Other and unspecified hyperlipidemia 7/6/2010    Proctitis     Sarcoidosis age 30   (200)    entirely resolved 1986      Past Surgical History:   Procedure Laterality Date    BONE GRAFT FEMUR HEAD/NECK/RIDGE  age 56    (5)    Left Hip    BREAST SURGERY PROCEDURE UNLISTED      BREST BIOPIES X 4 WITH 1 MALIGNANT.     ENDOSCOPY, COLON, DIAGNOSTIC  age 55   (2006    told to repeat in 5 years Dr. Sakina Ruelas LUMPECTOMY  5/22/2013    BREAST LUMPECTOMY WITH AXILLARY NODE DISSECTION performed by Cornelio Nieto MD at [address] [place] [address] HX DILATION AND CURETTAGE  age 24      (26)    HX FRACTURE TX  age 51    (56)    5th metatarsal left foot    HX HEENT  2012    LASER SURGERY FOR \"HOLES IN RT & TETACHED RETINA IN LT\"    HX HEENT      EXTRACTION OF WISDOM TEETH    HX HEENT  8/2015 vitrectomy    HX HYSTERECTOMY  age 40       (46)   [de-identified] OOPHORECTOMY  age 41       (65)    RIGHT ONLY    HX ORTHOPAEDIC  2012    LEFT HIP REPLACEMENT    HX ORTHOPAEDIC  1997    LEFT BONE GRAFT DONE    HX ORTHOPAEDIC  1998    RIGHT HIP REVISION    HX ORTHOPAEDIC  2012    RIGHT HIP REVISION    HX REFRACTIVE SURGERY      HX TUBAL LIGATION  age 44      (26)    INCISE FINGER TENDON SHEATH   age 47    (65)    Trigger finger    PELVIS/HIP JOINT SURGERY UNLISTED  age 45     (36)    RIGHT HIP REPLACEMENT    AR REVISE TOTAL HIP REPLACEMENT  age 53     (56)    right hip    AR REVISE TOTAL HIP REPLACEMENT  age 65      (2012)    left hip    TREAT ECTOPIC PREG,NON REMVAL  age 44  (26)     Current Outpatient Prescriptions   Medication Sig Dispense Refill    therapeutic multivitamin (THERAGRAN) tablet Take 1 Tab by mouth daily.  aspirin delayed-release 81 mg tablet Take 1 Tab by mouth daily. 30 Tab 1    anastrozole (ARIMIDEX) 1 mg tablet Take 1 Tab by mouth daily. 90 Tab 3    OTHER Take 1 Cap by mouth daily. theracran HP daily       Denosumab (PROLIA) 60 mg/mL injection 60 mg by SubCUTAneous route. Every six months      calcium-vitamin D (CALCIUM 500+D) 500 mg(1,250mg) -200 unit per tablet Take 1 Tab by mouth.        Allergies   Allergen Reactions    Bactrim [Sulfamethoprim Ds] Other (comments)     Skin reaction    Ciprofloxacin Rash    Relafen [Nabumetone] Swelling     Facial Swelling     Family History   Problem Relation Age of Onset    Breast Cancer Mother 67     in situ    Cancer Mother 67     breast ca and esophageal    Heart defect Mother      murmur diag in 19's, never treated   Hersghazall Nohemy Arthritis-osteo Mother      late 52's early 63's    Osteoporosis Mother 76    Other Mother 78     lupus - declared disease free in 2004    Arrhythmia Mother 80     pacemaker    Hypertension Father     Heart defect Father      murmur idagnosed in 19's    Kidney Disease Father 80     renal stent    High Cholesterol Father     Heart Disease Father      aortic stenosis    Osteoporosis Brother 49    Arthritis-osteo Brother 49    Other Brother      age 34 hypoglycemia & age 47 Raynaud    Heart Disease Maternal Grandfather      developed in 42's    Hypertension Paternal Grandfather     Kidney Disease Paternal Grandfather     Other Maternal Grandmother 71     amyotrophic lateral sclerosis (ALS)/ALS    Hypertension Brother 47    Other Brother 47     GERD    High Cholesterol Brother 47    Other Brother 62     Multilevel DDD    Other Brother 62     Spondylolithesis    Other Brother 62     Servin's Esophagus, Diverticulosis, Gastritis    Other Brother 62     Bladder neck obstruction, swollen adrenal glands, small vessel disease    Cancer Brother 61     Melanoma and Squamous Cell Carcinoma     Social History   Substance Use Topics    Smoking status: Former Smoker     Packs/day: 0.25     Years: 6.00     Quit date: 1/1/1980    Smokeless tobacco: Never Used    Alcohol use 0.5 oz/week     1 Glasses of wine per week     Patient Active Problem List   Diagnosis Code    Hyperlipidemia E78.5    Osteoporosis M81.0    Avascular necrosis of femur head     [facility] complication of hip prosthesis ([facility] T84.098A, Z96.649    Breast cancer ([facility] C50.919    S/P lumpectomy, right breast Z98.890    Aromatase inhibitor use Z79.811    Anxiety F41.9    Allergic cough R05    Vaginal dryness, menopausal N95.1    Anxiety about health F41.8    Changes in vision H53.9       Depression Risk Factor Screening:     PHQ over the last two weeks 10/24/2017   Little interest or pleasure in doing things Not at all   Feeling down, depressed or hopeless Not at all   Total Score PHQ 2 0     Alcohol Risk Factor Screening:   Drinking less than 7 per week and never 3 at any 1 time. Functional Ability and Level of Safety:     Hearing Loss  Hearing is good.     Activities of Daily Living  The home contains: no safety equipment. Patient does total self care    Fall Risk  Fall Risk Assessment, last 12 mths 10/24/2017   Able to walk? Yes   Fall in past 12 months? No       Abuse Screen  Patient is not abused    Cognitive Screening   Evaluation of Cognitive Function:  Has your family/caregiver stated any concerns about your memory: no  Normal  Review of Systems   Constitutional: Negative for malaise/fatigue. Eyes: Negative for blurred vision. Respiratory: Negative for shortness of breath. Cardiovascular: Negative for chest pain and leg swelling. Gastrointestinal: Negative for abdominal pain, blood in stool and heartburn. Genitourinary: Negative for dysuria, frequency and urgency. Musculoskeletal: Negative for joint pain. Neurological: Negative for dizziness, sensory change, focal weakness and headaches. Psychiatric/Behavioral: Negative for depression. The patient is not nervous/anxious. Physical Exam   Constitutional: She appears well-developed. No distress. /74  Pulse 64  Temp 97.6 °F (36.4 °C) (Oral)   Resp 16  Ht 5' (1.524 m)  Wt 105 lb 3.2 oz (47.7 kg)  SpO2 98%  BMI 20.55 kg/m2   HENT:   Head: Normocephalic and atraumatic. Nose: Nose normal.   Mouth/Throat: Oropharynx is clear and moist.   Eyes: Conjunctivae and EOM are normal. Pupils are equal, round, and reactive to light. Neck: Carotid bruit is not present. No thyromegaly present. Cardiovascular: Normal rate, regular rhythm, normal heart sounds and intact distal pulses. No murmur heard. Pulmonary/Chest: Effort normal and breath sounds normal. She has no wheezes. Abdominal: Soft. Bowel sounds are normal. She exhibits no distension and no mass. There is no tenderness. Musculoskeletal: She exhibits no edema. Lymphadenopathy:     She has no cervical adenopathy. Psychiatric: She has a normal mood and affect. Vitals reviewed.     Patient Care Team   Patient Care Team:  Cristino Jones MD as PCP - Jenny Barahona MD (Urology)  Janki Kapoor MD (Urology)    Assessment/Plan   Education and counseling provided:  Are appropriate based on today's review and evaluation    Diagnoses and all orders for this visit:    1. Initial Medicare annual wellness visit  Health maintenance reviewed and updated with patient today at visit. continue healthy lifestyle, exercise, diet and weight. 2. Encounter for immunization  -     UT IMMUNIZ ADMIN,1 SINGLE/COMB VAC/TOXOID  -     INFLUENZA VIRUS VACCINE, HIGH DOSE SEASONAL, PRESERVATIVE FREE    3. Screening for alcoholism  -     Annual  Alcohol Screen 15 min ()    4. Pure hypercholesterolemia  Please continue to work on low fat, low cholesterol diet and exercise. Reviewed recent lab work. 5. Osteoporosis, unspecified osteoporosis type, unspecified pathological fracture presence  Followed by Dr. Yobani Norris  at [facility]. Continues with calcium vitamin D and Prolia. 6. Malignant neoplasm of right female breast, unspecified estrogen receptor status, unspecified site of breast ([facility]  Followed closely by Dr. Allegra Newton. She is tolerating Arimidex with no problems. No evidence of recurrence. Reviewed current ACP on file and no changes have been made.

## 2017-10-24 NOTE — PROGRESS NOTES
Chief Complaint   Patient presents with   [facility] Annual Wellness Visit     1. Have you been to the ER, urgent care clinic since your last visit? Hospitalized since your last visit? No    2. Have you seen or consulted any other health care providers outside of the [address] [place] [address] since your last visit? Include any pap smears or colon screening. Zoila Madrigal is a 78 y.o. female  who present for routine immunizations. she  denies any symptoms , reactions or allergies that would exclude them from being immunized today. Risks and adverse reactions were discussed and the VIS was given to them. All questions were addressed. she was observed for  5 min post injection. There were no reactions observed.     Bry Clark LPN

## 2017-10-24 NOTE — PATIENT INSTRUCTIONS

## 2017-11-17 ENCOUNTER — OFFICE VISIT (OUTPATIENT)
Dept: ONCOLOGY | Age: 67
End: 2017-11-17

## 2017-11-17 VITALS
HEART RATE: 68 BPM | BODY MASS INDEX: 20.01 KG/M2 | DIASTOLIC BLOOD PRESSURE: 76 MMHG | RESPIRATION RATE: 16 BRPM | OXYGEN SATURATION: 98 % | TEMPERATURE: 97.7 F | WEIGHT: 106 LBS | HEIGHT: 61 IN | SYSTOLIC BLOOD PRESSURE: 133 MMHG

## 2017-11-17 DIAGNOSIS — Z17.0 MALIGNANT NEOPLASM OF RIGHT BREAST IN FEMALE, ESTROGEN RECEPTOR POSITIVE, UNSPECIFIED SITE OF BREAST (HCC): Primary | ICD-10-CM

## 2017-11-17 DIAGNOSIS — N95.1 VAGINAL DRYNESS, MENOPAUSAL: ICD-10-CM

## 2017-11-17 DIAGNOSIS — C50.911 MALIGNANT NEOPLASM OF RIGHT BREAST IN FEMALE, ESTROGEN RECEPTOR POSITIVE, UNSPECIFIED SITE OF BREAST (HCC): Primary | ICD-10-CM

## 2017-11-17 DIAGNOSIS — Z79.811 AROMATASE INHIBITOR USE: ICD-10-CM

## 2017-11-17 DIAGNOSIS — Z98.890 S/P LUMPECTOMY, RIGHT BREAST: ICD-10-CM

## 2017-11-17 DIAGNOSIS — M81.0 OSTEOPOROSIS, UNSPECIFIED OSTEOPOROSIS TYPE, UNSPECIFIED PATHOLOGICAL FRACTURE PRESENCE: ICD-10-CM

## 2017-11-17 NOTE — MR AVS SNAPSHOT
Visit Information Date & Time Provider Department Dept. Phone Encounter #  
 11/17/2017  8:30 AM Kristian Bustos, [address]  Oncology at [address] [id number] [phone] Follow-up Instructions Return in about 6 months (around 5/17/2018). Routing History Follow-up and Disposition History Your Appointments 10/25/2018  9:15 AM  
Medicare Physical with Gar Romberg, MD  
[facility] [address]) Appt Note: cpe/last cpe 10.24.17  
 [address] [facility] [address] [facility] [address]  
[address] [place] [address] Upcoming Health Maintenance Date Due  
 BREAST CANCER SCRN MAMMOGRAM 7/5/2018 DTaP/Tdap/Td series (2 - Td) 10/14/2018 MEDICARE YEARLY EXAM 10/25/2018 GLAUCOMA SCREENING Q2Y 9/3/2019 COLONOSCOPY 10/8/2023 Allergies as of 11/17/2017  Review Complete On: 11/17/2017 By: Kristian Bustos DO Severity Noted Reaction Type Reactions Bactrim [Sulfamethoprim Ds]  10/18/2016    Other (comments) Skin reaction Ciprofloxacin  10/18/2016    Rash Relafen [Nabumetone] Low 08/18/2011    Swelling Facial Swelling Current Immunizations  Reviewed on 11/17/2017 Name Date Hepatitis A Vaccine 11/1/1999, 11/1/1999 Hepatitis B Vaccine 1/1/2004, 1/1/2000, 1/1/2000 Influenza High Dose Vaccine PF 10/24/2017 Influenza Vaccine 10/18/2016, 10/15/2015, 9/25/2013 PPD 12/14/2004 Pneumococcal Conjugate (PCV-13) 3/10/2016 Pneumococcal Polysaccharide (PPSV-23) 3/13/2017 TD Vaccine 7/5/2006 TDAP Vaccine 10/14/2008 Zoster 4/15/2011 Reviewed by Mari Saunders LPN on 89/18/8133 at  8:29 AM  
You Were Diagnosed With   
  
 Codes Comments Malignant neoplasm of right breast in female, estrogen receptor positive, unspecified site of breast ([facility]    -  Primary ICD-10-CM: C50.911, Z17.0 ICD-9-CM: 174.9, V86.0 S/P lumpectomy, right breast     ICD-10-CM: P31.488 ICD-9-CM: V45.89 Aromatase inhibitor use     ICD-10-CM: Y41.118 ICD-9-CM: V07.52 Osteoporosis, unspecified osteoporosis type, unspecified pathological fracture presence     ICD-10-CM: M81.0 ICD-9-CM: 733.00 Vaginal dryness, menopausal     ICD-10-CM: N95.1 ICD-9-CM: 627.2 Vitals BP Pulse Temp Resp Height(growth percentile) Weight(growth percentile) 133/76 68 97.7 °F (36.5 °C) (Oral) 16 5' 1\" (1.549 m) 106 lb (48.1 kg) SpO2 BMI OB Status Smoking Status 98% 20.03 kg/m2 Hysterectomy Former Smoker Vitals History BMI and BSA Data Body Mass Index Body Surface Area 20.03 kg/m 2 1.44 m 2 Preferred Pharmacy Pharmacy Name Phone [facility] [address], [address] [facility] [phone] Your Updated Medication List  
  
   
This list is accurate as of: 11/17/17  9:20 AM.  Always use your most recent med list.  
  
  
  
  
 anastrozole 1 mg tablet Commonly known as:  ARIMIDEX Take 1 Tab by mouth daily. aspirin delayed-release 81 mg tablet Take 1 Tab by mouth daily. CALCIUM 500+D 500 mg(1,250mg) -200 unit per tablet Generic drug:  calcium-vitamin D Take 1 Tab by mouth. OTHER Take 1 Cap by mouth daily. theracran HP daily PROLIA 60 mg/mL injection Generic drug:  denosumab 60 mg by SubCUTAneous route. Every six months  
  
 therapeutic multivitamin tablet Commonly known as:  [facility] Take 1 Tab by mouth daily. Follow-up Instructions Return in about 6 months (around 5/17/2018). Introducing [facility]! Dear Janice Valencia: 
Thank you for requesting a [employer] account. Our records indicate that you already have an active [employer] account. You can access your account anytime at [email or web address] Did you know that you can access your hospital and ER discharge instructions at any time in [employer]? You can also review all of your test results from your hospital stay or ER visit. Additional Information If you have questions, please visit the Frequently Asked Questions section of the [employer] website at [email or web address] Remember, [employer] is NOT to be used for urgent needs. For medical emergencies, dial 911. Now available from your iPhone and Android! Please provide this summary of care documentation to your next provider. Your primary care clinician is listed as Storm Sotero. If you have any questions after today's visit, please call [phone].

## 2017-11-17 NOTE — PROGRESS NOTES
HEME/ONC PROGRESS NOTE       Minesh Hudson is a 78 y.o. 1950 female and presents with Breast Cancer    CC  Breast cancer dx 5/13    HPI: Patient is here for 6 mo fu breast cancer currently taking adjuvant Arimidex. Was in hospital for visual changes and brain MRI negative. MRI and mammo at [facility] 5/17 and had biopsy path fibrosis. C/o continued vaginal dryness/ biggest symptom. Tolerating AI fine. DX   Breast Cancer     STAGE: T1bN0  ER+ her2 neg  oncotype low risk    TREATMENT COURSE: lumpectomy right 5/22/13,  XRT,  Arimidex. Past Medical History:   Diagnosis Date    Avascular necrosis [facility]) age 45     right hip > replaced    AVN (avascular necrosis of bone) ([facility] age 56       (1997)    left hip > replaced    Breast cancer ([facility] 5/21/2013    Ectopic pregnancy age 44     (26)    Nausea & vomiting     Osteoarthritis of finger(s) age 53       (36)    Osteoporosis age 51   (56)   [employer] Other and unspecified hyperlipidemia 7/6/2010    Proctitis     Sarcoidosis age 30   (200)    entirely resolved 1986     Past Surgical History:   Procedure Laterality Date    BONE GRAFT FEMUR HEAD/NECK/RIDGE  age 56    (5)    Left Hip    BREAST SURGERY PROCEDURE UNLISTED      BREST BIOPIES X 4 WITH 1 MALIGNANT.     ENDOSCOPY, COLON, DIAGNOSTIC  age 55   (2006    told to repeat in 5 years Dr. Sal Bernheim LUMPECTOMY  5/22/2013    BREAST LUMPECTOMY WITH AXILLARY NODE DISSECTION performed by Jeison Watt MD at [address]  age 24      (26)    HX FRACTURE TX  age 51    (56)    5th metatarsal left foot    HX HEENT  2012    LASER SURGERY FOR \"HOLES IN RT & TETACHED RETINA IN LT\"    HX HEENT      EXTRACTION OF WISDOM TEETH    HX HEENT  8/2015    vitrectomy    HX HYSTERECTOMY  age 40       (46)   [de-identified] OOPHORECTOMY  age 41       (65)    RIGHT ONLY    HX ORTHOPAEDIC  2012    LEFT HIP REPLACEMENT    HX ORTHOPAEDIC  1997    LEFT BONE GRAFT DONE    HX ORTHOPAEDIC  1998    RIGHT HIP REVISION    HX ORTHOPAEDIC  2012    RIGHT HIP REVISION    HX REFRACTIVE SURGERY      HX TUBAL LIGATION  age 44      (26)    INCISE FINGER TENDON SHEATH   age 47    (2002)    Trigger finger    PELVIS/HIP JOINT SURGERY UNLISTED  age 45     (36)    RIGHT HIP REPLACEMENT    VA REVISE TOTAL HIP REPLACEMENT  age 53     (56)    right hip    VA REVISE TOTAL HIP REPLACEMENT  age 65      (2012)    left hip    TREAT ECTOPIC PREG,NON REMVAL  age 44  (26)     Social History     Social History    Marital status:      Spouse name: N/A    Number of children: N/A    Years of education: N/A     Social History Main Topics    Smoking status: Former Smoker     Packs/day: 0.25     Years: 6.00     Quit date: 1/1/1980    Smokeless tobacco: Never Used    Alcohol use 0.5 oz/week     1 Glasses of wine per week    Drug use: No    Sexual activity: Yes     Partners: Male     Birth control/ protection: None     Other Topics Concern    None     Social History Narrative     Family History   Problem Relation Age of Onset    Breast Cancer Mother 67     in situ    Cancer Mother 67     breast ca and esophageal    Heart defect Mother      murmur diag in 19's, never treated   [address] [place] [address] Arthritis-osteo Mother      late 52's early 63's    Osteoporosis Mother 76    Other Mother 78     lupus - declared disease free in 2004    Arrhythmia Mother 80     pacemaker    Hypertension Father     Heart defect Father      murmur idagnosed in 19's    Kidney Disease Father 80     renal stent    High Cholesterol Father     Heart Disease Father      aortic stenosis    Osteoporosis Brother 47    Arthritis-osteo Brother 47    Other Brother      age 34 hypoglycemia & age 47 Raynaud    Heart Disease Maternal Grandfather      developed in 42's    Hypertension Paternal Grandfather     Kidney Disease Paternal Grandfather     Other Maternal Grandmother 71     amyotrophic lateral sclerosis (ALS)/ALS    Hypertension Brother 47    Other Brother 47     GERD    High Cholesterol Brother 47    Other Brother 62     Multilevel DDD    Other Brother 62     Spondylolithesis    Other Brother 62     Servin's Esophagus, Diverticulosis, Gastritis    Other Brother 62     Bladder neck obstruction, swollen adrenal glands, small vessel disease    Cancer Brother 63     Melanoma and Squamous Cell Carcinoma       Current Outpatient Prescriptions   Medication Sig Dispense Refill    therapeutic multivitamin (THERAGRAN) tablet Take 1 Tab by mouth daily.  aspirin delayed-release 81 mg tablet Take 1 Tab by mouth daily. 30 Tab 1    anastrozole (ARIMIDEX) 1 mg tablet Take 1 Tab by mouth daily. 90 Tab 3    OTHER Take 1 Cap by mouth daily. theracran HP daily       Denosumab (PROLIA) 60 mg/mL injection 60 mg by SubCUTAneous route. Every six months      calcium-vitamin D (CALCIUM 500+D) 500 mg(1,250mg) -200 unit per tablet Take 1 Tab by mouth. Allergies   Allergen Reactions    Bactrim [Sulfamethoprim Ds] Other (comments)     Skin reaction    Ciprofloxacin Rash    Relafen [Nabumetone] Swelling     Facial Swelling       Review of Systems    A comprehensive review of systems was negative except for:  Per HPI.       Objective:  Visit Vitals    /76    Pulse 68    Temp 97.7 °F (36.5 °C) (Oral)    Resp 16    Ht 5' 1\" (1.549 m)    Wt 106 lb (48.1 kg)    SpO2 98%    BMI 20.03 kg/m2         Physical Exam:   General appearance - alert, well appearing, and in no distress, oriented to person, place, and time and normal appearing weight  Mental status - alert, oriented to person, place, and time, normal mood, behavior, speech, dress, motor activity, and thought processes  EYE-conj clear  Mouth - mucous membranes moist, pharynx normal without lesions  Neck - supple, no significant adenopathy   Chest - clear to auscultation, no wheezes, rales or rhonchi, symmetric air entry   Heart - normal rate and regular rhythm  Abdomen - soft, nontender  Ext-no pedal edema noted  Skin-Warm and dry. No rashes/lesions noted. Neuro -alert, oriented, normal speech, no focal findings or movement disorder noted  Breast -no masses palpable b/l        Diagnostic Imaging   reviewed  Results for orders placed during the hospital encounter of 10/04/12   XR HIP RT AP 1 V    Narrative **Final Report**       ICD Codes / Adm. Diagnosis:    / Thresea Righter WARE RIGHT TOAL HI  DJD  Examination:  CR HIP 1 VW UNI RT  - [id number] - Oct  4 2012 11:02AM  Accession No:  [id number]  Reason:  Post-op Study      REPORT:  INDICATION:  DJD, postop. COMPARISON:  No old study. FINDINGS:  A portable view of the right hip shows a hip replacement in   satisfactory position. Postoperative change in the soft tissues with a   surgical drain and overlying sutures is present. IMPRESSION:  Right hip replacement. Signing/Reading Doctor: Radha Nielson ([id number])    Approved: Radha Nielson ([id number])  10/04/2012                                      Results for orders placed during the hospital encounter of 09/07/17   CT HEAD WO CONT    Narrative EXAM:  CT HEAD WO CONT    INDICATION:   Bilateral vision changes for one week    COMPARISON: None. CONTRAST:  None. TECHNIQUE: Unenhanced CT of the head was performed using 5 mm images. Brain and  bone windows were generated. CT dose reduction was achieved through use of a  standardized protocol tailored for this examination and automatic exposure  control for dose modulation. FINDINGS:  The ventricles and sulci are normal in size, shape and configuration and  midline. There is no significant white matter disease. There is no intracranial  hemorrhage, extra-axial collection, mass, mass effect or midline shift. The  basilar cisterns are open. No acute infarct is identified. The bone windows  demonstrate no abnormalities.  The visualized portions of the paranasal sinuses  and mastoid air cells are clear. Vascular calcification is noted. Impression IMPRESSION: No acute abnormality. Lab Results  Reviewed  Per PCP    Lab Results   Component Value Date/Time    WBC 6.9 09/07/2017 09:59 AM    HGB 14.2 09/07/2017 09:59 AM    HCT 42.5 09/07/2017 09:59 AM    PLATELET 352 84/63/0610 09:59 AM    MCV 94.7 09/07/2017 09:59 AM       Lab Results   Component Value Date/Time    Sodium 141 09/07/2017 09:59 AM    Potassium 3.5 09/07/2017 09:59 AM    Chloride 106 09/07/2017 09:59 AM    CO2 27 09/07/2017 09:59 AM    Anion gap 8 09/07/2017 09:59 AM    Glucose 94 09/07/2017 09:59 AM    BUN 13 09/07/2017 09:59 AM    Creatinine 0.85 09/07/2017 09:59 AM    BUN/Creatinine ratio 15 09/07/2017 09:59 AM    GFR est AA >60 09/07/2017 09:59 AM    GFR est non-AA >60 09/07/2017 09:59 AM    Calcium 9.2 09/07/2017 09:59 AM    AST (SGOT) 25 09/07/2017 09:59 AM    Alk. phosphatase 83 09/07/2017 09:59 AM    Protein, total 7.6 09/07/2017 09:59 AM    Albumin 4.1 09/07/2017 09:59 AM    Globulin 3.5 09/07/2017 09:59 AM    A-G Ratio 1.2 09/07/2017 09:59 AM    ALT (SGPT) 33 09/07/2017 09:59 AM       Assessment/Plan:     Diane Olivares is a 59 y.o. 1950 female and presents with Breast Cancer  . CC  Breast cancer    HPI: Patient is here for breast cancer currently taking adjuvant Arimidex. Doing well. STAGE: T1bN0  ER+ her2 neg  oncotype low risk. TREATMENT COURSE: lumpectomy 5/22/13  XRT. Arimadex. 1.  Stage 1 ER+ Her2 neg Breast cancer s/p lump/ XRT. NERD    Patient is tolerating Arimidex with minimal side effects    Continue Arimidex for 5 years and then can do BCI as pt wants to know risk of recurrence then.     mammo and breast MRI done at [facility] 5/17 and had biopsy with mass at site and path fibrosis. Labs stable per PCP     2. Osteoporosis  Per endocrine. Remains on prolia. 3.  Vaginal dryness. Per GYN. Will follow-up in 6 months, sooner if needed.     Call if questions. ICD-10-CM ICD-9-CM    1. Malignant neoplasm of right breast in female, estrogen receptor positive, unspecified site of breast ([facility] C50.911 174.9     Z17.0 V86.0    2. S/P lumpectomy, right breast Z98.890 V45.89    3. Aromatase inhibitor use Z79.811 V07.52    4. Osteoporosis, unspecified osteoporosis type, unspecified pathological fracture presence M81.0 733.00    5. Vaginal dryness, menopausal N95.1 627.2      No orders of the defined types were placed in this encounter. continue present plan, call if any problems  There are no Patient Instructions on file for this visit. Follow-up Disposition:  Return in about 6 months (around 5/17/2018).     Mary Alice Stewart, DO

## 2017-12-22 ENCOUNTER — TELEPHONE (OUTPATIENT)
Dept: INTERNAL MEDICINE CLINIC | Age: 67
End: 2017-12-22

## 2017-12-22 NOTE — TELEPHONE ENCOUNTER
Lee Rivera, patient asked if you would give her a call about something her orthopedic doctor saw on an xray -- would not elaborate.

## 2017-12-22 NOTE — TELEPHONE ENCOUNTER
Pt calling stating she saw her orthopedic doctor Dr Anthony Gary for her left shoulder and when an xray of the left shoulder was taken calcifications were seen in the pt lungs. Dr Anthony Gary is asking if Dr Mariajose Chamorro can order a full chest xray of the lungs for the pt. If Dr Mariajose Chamorro has any questions she can call Dr Anthony Gary at [phone]. Pt has requested records of the xray results be faxed to our office.

## 2017-12-27 NOTE — TELEPHONE ENCOUNTER
Faxed over a request to Dr Dot Flores office for results for pt left shoulder xray. Confirmation has been received.

## 2017-12-28 ENCOUNTER — DOCUMENTATION ONLY (OUTPATIENT)
Dept: ONCOLOGY | Age: 67
End: 2017-12-28

## 2017-12-28 ENCOUNTER — TELEPHONE (OUTPATIENT)
Dept: ONCOLOGY | Age: 67
End: 2017-12-28

## 2017-12-28 ENCOUNTER — TELEPHONE (OUTPATIENT)
Dept: INTERNAL MEDICINE CLINIC | Age: 67
End: 2017-12-28

## 2017-12-28 NOTE — TELEPHONE ENCOUNTER
Call from PCP office/Nohemi. Patient had shoulder xray per ortho which showed incidental calcifications and a CXR was recommended. Per Suzi Yang PCP is out of office until next week. RN requested xray to be faxed for review and would forward to provider for recommendation.

## 2017-12-28 NOTE — TELEPHONE ENCOUNTER
Patient called and identified by 2 personal identifiers pt notified that Dr Chandrakant Weathers office has received the MD note from [facility] and will review and advise accordingly and I will update the pat tomorrow. Pt thanked me for the call and states that she will be available tomorrow to discuss update.

## 2017-12-28 NOTE — TELEPHONE ENCOUNTER
Pt wants to find out if Dr Shawna Clark has been in touch with Dr Abhijeet Vega.   Advise - [phone]

## 2017-12-28 NOTE — TELEPHONE ENCOUNTER
Pt was called and identified by 2 personal identifiers. Patient is concerned that [facility] fax[facility] the Md note twice prior to today and I am not able to locate this note. I have the note that was faxed directly to me today and I have faxed this over to Dr [facility] office and will ask Md on call if he would like to defer this to Dr Fito Lou once she returns to the office on Tuesday. The patient states that she has a copy of the report however after further questioning patient has the Dr Bass Nail note which does state lung calcification noted but it does not state CXR suggested. I voiced my understanding and explained to the patient that Dr [facility] office and Dr Dino Arreola office is working on this and that I will contact her by the close of business with an update which will be CXR order OR waiting until Dr Fito Lou returns to the office 01/02/2018. Pt verbalized understanding.

## 2017-12-29 ENCOUNTER — TELEPHONE (OUTPATIENT)
Dept: ONCOLOGY | Age: 67
End: 2017-12-29

## 2017-12-29 ENCOUNTER — TELEPHONE (OUTPATIENT)
Dept: INTERNAL MEDICINE CLINIC | Age: 67
End: 2017-12-29

## 2017-12-29 NOTE — TELEPHONE ENCOUNTER
Received a call from Dr Bahena Board office from [facility] Dr Bahena Board nurse that the nurse practioner who is working for Dr Marva Galdamez at this time is deferring the chest xray order to Dr Judith Enrst.

## 2017-12-29 NOTE — TELEPHONE ENCOUNTER
Called pt and informed her that Dr Kelil Morrow would like for pt to come in to be seen.  Pt is scheduled for wed 01/3/18 at 1015 am.

## 2017-12-29 NOTE — TELEPHONE ENCOUNTER
Please call pt and ask her to make an appointment to discuss what ever the concern is that requires a chest x-ray. Without seeing her I do not know if that is the proper study to order. It looks like Dr. Baron Mane is deferring this to me and she will need an appointment. Please obtain the notes from her specialist for asked her to bring those with her.

## 2018-01-02 ENCOUNTER — TELEPHONE (OUTPATIENT)
Dept: INTERNAL MEDICINE CLINIC | Age: 68
End: 2018-01-02

## 2018-01-02 NOTE — TELEPHONE ENCOUNTER
Pt called the office and does not understand why she must be seen prior to chest Xxray that Dr Tung Castro suggested to the patient. Pt is requesting that Dr Raudel Vincent speaks to Dr Tung Castro prior to her scheduled 10 am appt to confirm that she need to be seen prior to chest xray. Pt feels that it is a waste of time and money to be seen prior to CXR.

## 2018-01-03 ENCOUNTER — OFFICE VISIT (OUTPATIENT)
Dept: INTERNAL MEDICINE CLINIC | Age: 68
End: 2018-01-03

## 2018-01-03 ENCOUNTER — HOSPITAL ENCOUNTER (OUTPATIENT)
Dept: GENERAL RADIOLOGY | Age: 68
Discharge: HOME | End: 2018-01-03
Attending: INTERNAL MEDICINE
Payer: MEDICARE

## 2018-01-03 VITALS
OXYGEN SATURATION: 98 % | TEMPERATURE: 98.1 F | DIASTOLIC BLOOD PRESSURE: 70 MMHG | BODY MASS INDEX: 19.63 KG/M2 | HEIGHT: 61 IN | RESPIRATION RATE: 16 BRPM | WEIGHT: 104 LBS | SYSTOLIC BLOOD PRESSURE: 118 MMHG | HEART RATE: 76 BPM

## 2018-01-03 DIAGNOSIS — R93.89 ABNORMAL CHEST XRAY: ICD-10-CM

## 2018-01-03 DIAGNOSIS — R93.89 ABNORMAL X-RAY: Primary | ICD-10-CM

## 2018-01-03 DIAGNOSIS — R93.89 ABNORMAL CHEST XRAY: Primary | ICD-10-CM

## 2018-01-03 PROCEDURE — 71046 X-RAY EXAM CHEST 2 VIEWS: CPT

## 2018-01-03 NOTE — PROGRESS NOTES
HPI:  Joann Blank is a 78y.o. year old female who returns to clinic today for an acute visit:   She presents today to discuss her chest x-ray. She was recently evaluated by her orthopedics physician who did a left shoulder x-ray which showed some calcifications in her chest and he recommended that she have a chest x-ray performed and requested that I order that. On review of her chest x-ray today it does show some calcification in the costochondral region as well as some calcified mediastinal nodes. She denies any pulmonary symptoms. No weight loss fevers chills, shortness of breath, cough, joint pains. No history of any rheumatologic diseases and no history of sarcoid. She is followed by oncology for a history of breast cancer and is currently on Arimidex and tolerating the medication well. Current Outpatient Prescriptions   Medication Sig Dispense Refill    therapeutic multivitamin (THERAGRAN) tablet Take 1 Tab by mouth daily.  aspirin delayed-release 81 mg tablet Take 1 Tab by mouth daily. 30 Tab 1    OTHER Take 1 Cap by mouth daily. theracran HP daily       Denosumab (PROLIA) 60 mg/mL injection 60 mg by SubCUTAneous route. Every six months      calcium-vitamin D (CALCIUM 500+D) 500 mg(1,250mg) -200 unit per tablet Take 1 Tab by mouth.  anastrozole (ARIMIDEX) 1 mg tablet Take 1 Tab by mouth daily. 90 Tab 3     Allergies   Allergen Reactions    Bactrim [Sulfamethoprim Ds] Other (comments)     Skin reaction    Ciprofloxacin Rash    Relafen [Nabumetone] Swelling     Facial Swelling     Social History   Substance Use Topics    Smoking status: Former Smoker     Packs/day: 0.25     Years: 6.00     Quit date: 1/1/1980    Smokeless tobacco: Never Used    Alcohol use 0.5 oz/week     1 Glasses of wine per week         Review of Systems   Constitutional: Negative for malaise/fatigue. Respiratory: Negative for shortness of breath. Cardiovascular: Negative for chest pain and leg swelling. Gastrointestinal: Negative for abdominal pain and heartburn. Neurological: Negative for dizziness and headaches. Physical Exam   Constitutional: She appears well-developed. No distress. /70  Pulse 76  Temp 98.1 °F (36.7 °C) (Oral)   Resp 16  Ht 5' 1\" (1.549 m)  Wt 104 lb (47.2 kg)  SpO2 98%  BMI 19.65 kg/m2   Neck: No thyromegaly present. Cardiovascular: Normal rate, regular rhythm, normal heart sounds and intact distal pulses. No murmur heard. Pulmonary/Chest: Effort normal and breath sounds normal. She has no wheezes. Abdominal: Soft. Bowel sounds are normal. She exhibits no distension and no mass. There is no tenderness. Musculoskeletal: She exhibits no edema. Lymphadenopathy:     She has no cervical adenopathy. Psychiatric: She has a normal mood and affect. Vitals reviewed. Assessment & Plan:    ICD-10-CM ICD-9-CM    1. Abnormal x-ray R93.8 793.99    Patient is reassured that the calcification noted on her shoulder x-ray was the costochondral calcifications that are seen today on her chest x-ray. She does have some calcified mediastinal lymph nodes but otherwise her chest x-ray is normal.  She does not have history or symptoms of sarcoid or other granulomatous disease. Will also send this to her oncologist for review. follow up if any further concerns or problems. Advised her to call back or return to office if symptoms worsen/change/persist.  Discussed expected course/resolution/complications of diagnosis in detail with patient. She was given an after visit summary which includes diagnoses, current medications, & vitals. She expressed understanding with the diagnosis and plan.

## 2018-01-03 NOTE — PROGRESS NOTES
Chief Complaint   Patient presents with    Results     1. Have you been to the ER, urgent care clinic since your last visit? Hospitalized since your last visit? No    2. Have you seen or consulted any other health care providers outside of the [address] [place] [address] since your last visit? Include any pap smears or colon screening.  No     Pt had CXR at [facility] this am and states that the tech told her that she could keep appointment that the xray would be treated as a STAT read

## 2018-03-05 ENCOUNTER — TELEPHONE (OUTPATIENT)
Dept: INTERNAL MEDICINE CLINIC | Age: 68
End: 2018-03-05

## 2018-03-05 NOTE — TELEPHONE ENCOUNTER
Please call regarding the shingles shot.  She said she sent 2 my chart messages and never received a response

## 2018-03-06 NOTE — TELEPHONE ENCOUNTER
Called pt and notified her through a voicemail message a prescription for shingrix has been sent to her pharmacy.

## 2018-04-12 ENCOUNTER — TELEPHONE (OUTPATIENT)
Dept: ONCOLOGY | Age: 68
End: 2018-04-12

## 2018-04-12 NOTE — TELEPHONE ENCOUNTER
Pt would like a call back she felt something different in right breast than her non operative side breast.

## 2018-04-12 NOTE — TELEPHONE ENCOUNTER
HIPAA verified. Stated cannot describe breast lump, but feels difference in right breast.  Advised to follow here or with breast surgeon for appt at earliest convenience. Patient verbalized understanding and thanked for call. To PSR for appt.

## 2018-04-13 ENCOUNTER — OFFICE VISIT (OUTPATIENT)
Dept: ONCOLOGY | Age: 68
End: 2018-04-13

## 2018-04-13 ENCOUNTER — HOSPITAL ENCOUNTER (OUTPATIENT)
Dept: LAB | Age: 68
Discharge: HOME | End: 2018-04-13
Payer: MEDICARE

## 2018-04-13 ENCOUNTER — TELEPHONE (OUTPATIENT)
Dept: ONCOLOGY | Age: 68
End: 2018-04-13

## 2018-04-13 VITALS
WEIGHT: 104 LBS | HEART RATE: 67 BPM | RESPIRATION RATE: 16 BRPM | TEMPERATURE: 98 F | SYSTOLIC BLOOD PRESSURE: 138 MMHG | BODY MASS INDEX: 19.63 KG/M2 | HEIGHT: 61 IN | OXYGEN SATURATION: 98 % | DIASTOLIC BLOOD PRESSURE: 81 MMHG

## 2018-04-13 DIAGNOSIS — Z98.890 S/P LUMPECTOMY, RIGHT BREAST: ICD-10-CM

## 2018-04-13 DIAGNOSIS — C50.911 MALIGNANT NEOPLASM OF RIGHT BREAST IN FEMALE, ESTROGEN RECEPTOR POSITIVE, UNSPECIFIED SITE OF BREAST (HCC): Primary | ICD-10-CM

## 2018-04-13 DIAGNOSIS — R22.31 AXILLARY LUMP, RIGHT: ICD-10-CM

## 2018-04-13 DIAGNOSIS — Z17.0 MALIGNANT NEOPLASM OF RIGHT BREAST IN FEMALE, ESTROGEN RECEPTOR POSITIVE, UNSPECIFIED SITE OF BREAST (HCC): Primary | ICD-10-CM

## 2018-04-13 DIAGNOSIS — Z79.811 AROMATASE INHIBITOR USE: ICD-10-CM

## 2018-04-13 PROCEDURE — 36415 COLL VENOUS BLD VENIPUNCTURE: CPT

## 2018-04-13 PROCEDURE — 85025 COMPLETE CBC W/AUTO DIFF WBC: CPT

## 2018-04-13 PROCEDURE — 80053 COMPREHEN METABOLIC PANEL: CPT

## 2018-04-13 RX ORDER — ANASTROZOLE 1 MG/1
TABLET ORAL
COMMUNITY
Start: 2017-10-16 | End: 2018-04-13 | Stop reason: SDUPTHER

## 2018-04-13 RX ORDER — CEPHALEXIN 250 MG/1
CAPSULE ORAL
Refills: 1 | COMMUNITY
Start: 2018-03-14 | End: 2018-10-25

## 2018-04-13 NOTE — PROGRESS NOTES
Jaime Raya is a 80 y.o. female here today for follow up of breast cancer. States discomfort to area under right arm. She reports a couple weeks ago, she began to notice tightness if she raised her right arm.   She reports with palpation, she is able to feel some type of fullness & tiny bumps under right arm near breast.

## 2018-04-13 NOTE — PROGRESS NOTES
HEME/ONC PROGRESS NOTE       Yefri Al is a 80 y.o. 1950 female and presents with Breast Cancer    CC  Breast cancer dx 5/13    HPI: Patient is here for 6 mo fu breast cancer currently taking adjuvant Arimidex. She presents today for evaluation of a change in sensation under her right area. She noticed a change under her right arm a couple weeks ago. The area felt tight when she raised her right arm and she feels the area is hodgson. She feels a \"pimple\"-like area under the arm that is tender. She has noticed no swelling in the arm itself. She denies changes within the breast itself. She denies other changes, no nausea, vomiting or diarrhea. No shortness of breath or headaches. Otherwise, complete ROS is per the symptom report form which has been scanned into the media section of the electronic medical record. DX   Breast Cancer     STAGE: T1bN0  ER+ her2 neg  oncotype low risk    TREATMENT COURSE: lumpectomy right 5/22/13,  XRT,  Arimidex. Past Medical History:   Diagnosis Date    Avascular necrosis [facility]) age 45     right hip > replaced    AVN (avascular necrosis of bone) ([facility] age 56       (1997)    left hip > replaced    Breast cancer ([facility] 5/21/2013    Ectopic pregnancy age 44     (26)    Nausea & vomiting     Osteoarthritis of finger(s) age 53       (36)    Osteoporosis age 51   (56)   [facility] Other and unspecified hyperlipidemia 7/6/2010    Proctitis     Sarcoidosis age 30   (200)    entirely resolved 1986     Past Surgical History:   Procedure Laterality Date    BONE GRAFT FEMUR HEAD/NECK/RIDGE  age 56    (5)    Left Hip    BREAST SURGERY PROCEDURE UNLISTED      BREST BIOPIES X 4 WITH 1 MALIGNANT.     ENDOSCOPY, COLON, DIAGNOSTIC  age 55   (2006    told to repeat in 5 years Dr. Toñito Gamboa LUMPECTOMY  5/22/2013    BREAST LUMPECTOMY WITH AXILLARY NODE DISSECTION performed by Raheem Carter MD at [facility] [address]  age 24 (1970)    HX FRACTURE TX  age 51    (56)    5th metatarsal left foot    HX HEENT  2012    LASER SURGERY FOR \"HOLES IN RT & TETACHED RETINA IN LT\"    HX HEENT      EXTRACTION OF WISDOM TEETH    HX HEENT  8/2015    vitrectomy    HX HYSTERECTOMY  age 40       (46)   [de-identified] OOPHORECTOMY  age 41       (65)    RIGHT ONLY    HX ORTHOPAEDIC  2012    LEFT HIP REPLACEMENT    HX ORTHOPAEDIC  1997    LEFT BONE GRAFT DONE    HX ORTHOPAEDIC  1998    RIGHT HIP REVISION    HX ORTHOPAEDIC  2012    RIGHT HIP REVISION    HX REFRACTIVE SURGERY      HX TUBAL LIGATION  age 44      (26)    INCISE FINGER TENDON SHEATH   age 47    (65)    Trigger finger    PELVIS/HIP JOINT SURGERY UNLISTED  age 45     (36)    RIGHT HIP REPLACEMENT    [place] REVISE TOTAL HIP REPLACEMENT  age 53     (56)    right hip    [place] REVISE TOTAL HIP REPLACEMENT  age 65      (2012)    left hip    TREAT ECTOPIC PREG,NON REMVAL  age 44  (26)     Social History     Social History    Marital status:      Spouse name: N/A    Number of children: N/A    Years of education: N/A     Social History Main Topics    Smoking status: Former Smoker     Packs/day: 0.25     Years: 6.00     Quit date: 1/1/1980    Smokeless tobacco: Never Used    Alcohol use 0.5 oz/week     1 Glasses of wine per week    Drug use: No    Sexual activity: Yes     Partners: Male     Birth control/ protection: None     Other Topics Concern    None     Social History Narrative     Family History   Problem Relation Age of Onset   [facility] Breast Cancer Mother 67     in situ    Cancer Mother 67     breast ca and esophageal    Heart defect Mother      murmur diag in 19's, never treated    Arthritis-osteo Mother      late 52's early 63's    Osteoporosis Mother 76    Other Mother 78     lupus - declared disease free in 2004    Arrhythmia Mother 80     pacemaker    Hypertension Father     Heart defect Father      murmur idagnosed in 19's    Kidney Disease Father 80     renal stent    High Cholesterol Father     Heart Disease Father      aortic stenosis    Osteoporosis Brother 47    Arthritis-osteo Brother 47    Other Brother      age 34 hypoglycemia & age 47 Raynaud    Heart Disease Maternal Grandfather      developed in 42's    Hypertension Paternal Grandfather     Kidney Disease Paternal Grandfather     Other Maternal Grandmother 71     amyotrophic lateral sclerosis (ALS)/ALS    Hypertension Brother 47    Other Brother 47     GERD    High Cholesterol Brother 47    Other Brother 62     Multilevel DDD    Other Brother 62     Spondylolithesis    Other Brother 62     Servin's Esophagus, Diverticulosis, Gastritis    Other Brother 62     Bladder neck obstruction, swollen adrenal glands, small vessel disease    Cancer Brother 63     Melanoma and Squamous Cell Carcinoma       Current Outpatient Prescriptions   Medication Sig Dispense Refill    therapeutic multivitamin (THERAGRAN) tablet Take 1 Tab by mouth daily.  aspirin delayed-release 81 mg tablet Take 1 Tab by mouth daily. 30 Tab 1    OTHER Take 1 Cap by mouth daily. theracran HP daily       Denosumab (PROLIA) 60 mg/mL injection 60 mg by SubCUTAneous route. Every six months      calcium-vitamin D (CALCIUM 500+D) 500 mg(1,250mg) -200 unit per tablet Take 1 Tab by mouth.  anastrozole (ARIMIDEX) 1 mg tablet Take 1 Tab by mouth daily. 90 Tab 3    cephALEXin (KEFLEX) 250 mg capsule TAKE 1 CAPSULE BY MOUTH THREE TIMES DAILY AS DIRECTED FOR UTI SELF START  1       Allergies   Allergen Reactions    Bactrim [Sulfamethoprim Ds] Other (comments)     Skin reaction    Ciprofloxacin Rash    Relafen [Nabumetone] Swelling     Facial Swelling       Review of Systems    A comprehensive review of systems was negative except for:  Per HPI.       Objective:  Visit Vitals    /81    Pulse 67    Temp 98 °F (36.7 °C) (Oral)    Resp 16    Ht 5' 1\" (1.549 m)    Wt 104 lb (47.2 kg)    SpO2 98%    BMI 19.65 kg/m2 Physical Exam:   General appearance - alert, well appearing, and in no distress, oriented to person, place, and time and normal appearing weight  Mental status - alert, oriented to person, place, and time, normal mood, behavior, speech, dress, motor activity, and thought processes  EYE-conj clear  Mouth - mucous membranes moist, pharynx normal without lesions  Neck - supple, no significant adenopathy   Chest - clear to auscultation, no wheezes, rales or rhonchi, symmetric air entry   Heart - normal rate and regular rhythm  Abdomen - soft, nontender  Ext-no pedal edema noted  Skin-Warm and dry. No rashes/lesions noted. Neuro -alert, oriented, normal speech, no focal findings or movement disorder noted  Breast -no masses palpable bilaterally, right axillary slight fullness noted, no adenopathy appreciated    Diagnostic Imaging   reviewed  Results for orders placed in visit on 01/10/18   XR SHOULDER LT AP/LAT MIN 2 V       Results for orders placed during the hospital encounter of 09/07/17   CT HEAD WO CONT    Narrative EXAM:  CT HEAD WO CONT    INDICATION:   Bilateral vision changes for one week    COMPARISON: None. CONTRAST:  None. TECHNIQUE: Unenhanced CT of the head was performed using 5 mm images. Brain and  bone windows were generated. CT dose reduction was achieved through use of a  standardized protocol tailored for this examination and automatic exposure  control for dose modulation. FINDINGS:  The ventricles and sulci are normal in size, shape and configuration and  midline. There is no significant white matter disease. There is no intracranial  hemorrhage, extra-axial collection, mass, mass effect or midline shift. The  basilar cisterns are open. No acute infarct is identified. The bone windows  demonstrate no abnormalities. The visualized portions of the paranasal sinuses  and mastoid air cells are clear. Vascular calcification is noted.       Impression IMPRESSION: No acute abnormality. Lab Results  Reviewed  Per PCP    Lab Results   Component Value Date/Time    WBC 5.0 04/13/2018 01:15 PM    HGB 13.7 04/13/2018 01:15 PM    HCT 40.8 04/13/2018 01:15 PM    PLATELET 349 47/38/9644 01:15 PM    MCV 94 04/13/2018 01:15 PM       Lab Results   Component Value Date/Time    Sodium 141 04/13/2018 01:15 PM    Potassium 4.4 04/13/2018 01:15 PM    Chloride 98 04/13/2018 01:15 PM    CO2 27 04/13/2018 01:15 PM    Anion gap 8 09/07/2017 09:59 AM    Glucose 88 04/13/2018 01:15 PM    BUN 20 04/13/2018 01:15 PM    Creatinine 0.74 04/13/2018 01:15 PM    BUN/Creatinine ratio 27 04/13/2018 01:15 PM    GFR est AA 97 04/13/2018 01:15 PM    GFR est non-AA 84 04/13/2018 01:15 PM    Calcium 9.8 04/13/2018 01:15 PM    AST (SGOT) 25 04/13/2018 01:15 PM    Alk. phosphatase 64 04/13/2018 01:15 PM    Protein, total 6.5 04/13/2018 01:15 PM    Albumin 4.5 04/13/2018 01:15 PM    Globulin 3.5 09/07/2017 09:59 AM    A-G Ratio 2.3 (H) 04/13/2018 01:15 PM    ALT (SGPT) 25 04/13/2018 01:15 PM       Assessment/Plan:     Joelle Patrick is a 80 y.o. female and presents with Breast Cancer    CC  Breast cancer    HPI: Patient is here for breast cancer currently taking adjuvant Arimidex. Doing well. STAGE: T1bN0  ER+ her2 neg  oncotype low risk. TREATMENT COURSE: lumpectomy 5/22/13  XRT. Arimadex. 1.  Stage 1 ER+ Her2 neg Breast cancer s/p lump/ XRT. She has had no evidence of recurrent disease. Patient is tolerating Arimidex with minimal side effects. Will continue. Continue Arimidex for 5 years and then can do BCI as pt wants to know risk of recurrence then. Mammogram and breast MRI done at [facility] 5/17 and had biopsy with mass at site and path fibrosis. Will order routine labs today. 2. Right axillary \"lump. \" No palpable mass on exam today but fullness noted. Will order right axillary US to further assess.  Patient has had previous imaging at [facility] and prefers to have additional imaging there as well. 2.  Osteoporosis  Per endocrine. Remains on prolia. Will follow-up in 6 months, sooner if needed. Seen in conjunction with Gagan Oswald NP. ICD-10-CM ICD-9-CM    1. Malignant neoplasm of right breast in female, estrogen receptor positive, unspecified site [facility] C50.911 174.9 cephALEXin (KEFLEX) 250 mg capsule    Z17.0 V86.0 US BREAST AXILLA RT      CBC WITH AUTOMATED DIFF      METABOLIC PANEL, COMPREHENSIVE   2. Axillary lump, right R22.31 782.2 cephALEXin (KEFLEX) 250 mg capsule      US BREAST AXILLA RT      CBC WITH AUTOMATED DIFF      METABOLIC PANEL, COMPREHENSIVE   3. S/P lumpectomy, right breast Z98.890 V45.89    4. Aromatase inhibitor use Z79.811 V07.52      Orders Placed This Encounter    US BREAST AXILLA RT     Standing Status:   Future     Standing Expiration Date:   2019     Order Specific Question:   Reason for Exam     Answer:   Right axillary fullness, change in self-exam, history breast cancer, evaluate axilla    CBC WITH AUTOMATED DIFF    METABOLIC PANEL, COMPREHENSIVE    DISCONTD: anastrozole (ARIMIDEX) 1 mg tablet    DISCONTD: denosumab (PROLIA) 60 mg/mL injection     Si mg by SubCUTAneous route.  cephALEXin (KEFLEX) 250 mg capsule     Sig: TAKE 1 CAPSULE BY MOUTH THREE TIMES DAILY AS DIRECTED FOR UTI SELF START     Refill:  1       continue present plan, call if any problems  There are no Patient Instructions on file for this visit. Follow-up Disposition:  Return in about 6 months (around 10/13/2018).     Krista Hatchet, NP

## 2018-04-13 NOTE — TELEPHONE ENCOUNTER
Spoke with [facility] here. Can do ultrasound, but records from [address] [facility] needed. Patient advised and stated would proceed to [address]. Support given.

## 2018-04-14 LAB
ALBUMIN SERPL-MCNC: 4.5 G/DL (ref 3.6–4.8)
ALBUMIN/GLOB SERPL: 2.3 {RATIO} (ref 1.2–2.2)
ALP SERPL-CCNC: 64 IU/L (ref 39–117)
ALT SERPL-CCNC: 25 IU/L (ref 0–32)
AST SERPL-CCNC: 25 IU/L (ref 0–40)
BASOPHILS # BLD AUTO: 0.1 X10E3/UL (ref 0–0.2)
BASOPHILS NFR BLD AUTO: 1 %
BILIRUB SERPL-MCNC: 0.3 MG/DL (ref 0–1.2)
BUN SERPL-MCNC: 20 MG/DL (ref 8–27)
BUN/CREAT SERPL: 27 (ref 12–28)
CALCIUM SERPL-MCNC: 9.8 MG/DL (ref 8.7–10.3)
CHLORIDE SERPL-SCNC: 98 MMOL/L (ref 96–106)
CO2 SERPL-SCNC: 27 MMOL/L (ref 18–29)
CREAT SERPL-MCNC: 0.74 MG/DL (ref 0.57–1)
EOSINOPHIL # BLD AUTO: 0 X10E3/UL (ref 0–0.4)
EOSINOPHIL NFR BLD AUTO: 1 %
ERYTHROCYTE [DISTWIDTH] IN BLOOD BY AUTOMATED COUNT: 12.7 % (ref 12.3–15.4)
GFR SERPLBLD CREATININE-BSD FMLA CKD-EPI: 84 ML/MIN/1.73
GFR SERPLBLD CREATININE-BSD FMLA CKD-EPI: 97 ML/MIN/1.73
GLOBULIN SER CALC-MCNC: 2 G/DL (ref 1.5–4.5)
GLUCOSE SERPL-MCNC: 88 MG/DL (ref 65–99)
HCT VFR BLD AUTO: 40.8 % (ref 34–46.6)
HGB BLD-MCNC: 13.7 G/DL (ref 11.1–15.9)
IMM GRANULOCYTES # BLD: 0 X10E3/UL (ref 0–0.1)
IMM GRANULOCYTES NFR BLD: 0 %
LYMPHOCYTES # BLD AUTO: 1.4 X10E3/UL (ref 0.7–3.1)
LYMPHOCYTES NFR BLD AUTO: 28 %
MCH RBC QN AUTO: 31.6 PG (ref 26.6–33)
MCHC RBC AUTO-ENTMCNC: 33.6 G/DL (ref 31.5–35.7)
MCV RBC AUTO: 94 FL (ref 79–97)
MONOCYTES # BLD AUTO: 0.4 X10E3/UL (ref 0.1–0.9)
MONOCYTES NFR BLD AUTO: 7 %
NEUTROPHILS # BLD AUTO: 3.2 X10E3/UL (ref 1.4–7)
NEUTROPHILS NFR BLD AUTO: 63 %
PLATELET # BLD AUTO: 249 X10E3/UL (ref 150–379)
POTASSIUM SERPL-SCNC: 4.4 MMOL/L (ref 3.5–5.2)
PROT SERPL-MCNC: 6.5 G/DL (ref 6–8.5)
RBC # BLD AUTO: 4.34 X10E6/UL (ref 3.77–5.28)
SODIUM SERPL-SCNC: 141 MMOL/L (ref 134–144)
WBC # BLD AUTO: 5 X10E3/UL (ref 3.4–10.8)

## 2018-04-16 RX ORDER — ANASTROZOLE 1 MG/1
1 TABLET ORAL DAILY
Qty: 90 TAB | Refills: 3 | Status: SHIPPED | OUTPATIENT
Start: 2018-04-16 | End: 2018-10-25

## 2018-04-16 NOTE — TELEPHONE ENCOUNTER
Requested Prescriptions     Pending Prescriptions Disp Refills    anastrozole (ARIMIDEX) 1 mg tablet 90 Tab 3     Sig: Take 1 Tab by mouth daily.

## 2018-04-16 NOTE — PROGRESS NOTES
HIPAA verified. Advised of provider note. Stated needed to clarify if \"additional testing ordered\". Note reviewed and BCI discussed with patient. Advised would forward to provider for order. Lengthy conversation with patient re:  Patient transferring care for imaging to [facility] from [address]. Stated to have ultrasound with justa Johns 4/17/18. Wanted to know if bx needed should she have with justa Johns or breast surgeon/Dr. Riley Morrow. Advised patient can rust choice and support given.   (18:40 min)

## 2018-04-17 ENCOUNTER — TELEPHONE (OUTPATIENT)
Dept: ONCOLOGY | Age: 68
End: 2018-04-17

## 2018-04-17 NOTE — TELEPHONE ENCOUNTER
Pt called seen Dr. David Diaz at [facility] and the report should come this afternoon

## 2018-04-18 ENCOUNTER — TELEPHONE (OUTPATIENT)
Dept: ONCOLOGY | Age: 68
End: 2018-04-18

## 2018-04-18 NOTE — TELEPHONE ENCOUNTER
Reviewed report. Mammogram is stable. US shows focal superficial vein thrombosis, no evidence of DVT or mass. Discussed with Dr. Ruddy Tapia and there is no need for anticoagulation. Patient can follow-up with PCP.

## 2018-04-18 NOTE — TELEPHONE ENCOUNTER
Call placed to patient. HIPAA verified. Advised of provider note. Verbalized understanding. She agrees to monitor area & follow up with PCP if further problems.

## 2018-04-23 ENCOUNTER — DOCUMENTATION ONLY (OUTPATIENT)
Dept: ONCOLOGY | Age: 68
End: 2018-04-23

## 2018-04-23 NOTE — PROGRESS NOTES
Breast Cancer Index request complete and to provider for signature. Need to fax to [address] [place] [address] Rd @ [phone] when signed.

## 2018-04-23 NOTE — PROGRESS NOTES
Request recd from [facility] for bilateral breast MRI. Last done May 2017. To provider.   Fax:  [phone]  Phone:  [phone]

## 2018-04-24 DIAGNOSIS — R92.2 DENSE BREASTS: ICD-10-CM

## 2018-04-24 DIAGNOSIS — C50.911 MALIGNANT NEOPLASM OF RIGHT BREAST IN FEMALE, ESTROGEN RECEPTOR POSITIVE, UNSPECIFIED SITE OF BREAST (HCC): Primary | ICD-10-CM

## 2018-04-24 DIAGNOSIS — Z17.0 MALIGNANT NEOPLASM OF RIGHT BREAST IN FEMALE, ESTROGEN RECEPTOR POSITIVE, UNSPECIFIED SITE OF BREAST (HCC): Primary | ICD-10-CM

## 2018-04-30 DIAGNOSIS — F41.9 ANXIETY: Primary | ICD-10-CM

## 2018-04-30 RX ORDER — LORAZEPAM 1 MG/1
TABLET ORAL
Qty: 1 TAB | Refills: 0 | Status: SHIPPED | OUTPATIENT
Start: 2018-04-30 | End: 2018-10-25

## 2018-05-01 ENCOUNTER — TELEPHONE (OUTPATIENT)
Dept: ONCOLOGY | Age: 68
End: 2018-05-01

## 2018-05-07 ENCOUNTER — TELEPHONE (OUTPATIENT)
Dept: ONCOLOGY | Age: 68
End: 2018-05-07

## 2018-05-15 ENCOUNTER — OFFICE VISIT (OUTPATIENT)
Dept: ONCOLOGY | Age: 68
End: 2018-05-15

## 2018-05-15 VITALS
DIASTOLIC BLOOD PRESSURE: 74 MMHG | HEIGHT: 61 IN | OXYGEN SATURATION: 97 % | RESPIRATION RATE: 16 BRPM | BODY MASS INDEX: 19.83 KG/M2 | HEART RATE: 66 BPM | TEMPERATURE: 97.9 F | SYSTOLIC BLOOD PRESSURE: 131 MMHG | WEIGHT: 105 LBS

## 2018-05-15 DIAGNOSIS — Z17.0 MALIGNANT NEOPLASM OF RIGHT BREAST IN FEMALE, ESTROGEN RECEPTOR POSITIVE, UNSPECIFIED SITE OF BREAST (HCC): Primary | ICD-10-CM

## 2018-05-15 DIAGNOSIS — Z98.890 HISTORY OF LUMPECTOMY OF RIGHT BREAST: ICD-10-CM

## 2018-05-15 DIAGNOSIS — Z79.811 AROMATASE INHIBITOR USE: ICD-10-CM

## 2018-05-15 DIAGNOSIS — C50.911 MALIGNANT NEOPLASM OF RIGHT BREAST IN FEMALE, ESTROGEN RECEPTOR POSITIVE, UNSPECIFIED SITE OF BREAST (HCC): Primary | ICD-10-CM

## 2018-05-15 NOTE — PROGRESS NOTES
HEME/ONC PROGRESS NOTE       William Corcoran is a 80 y.o. 1950 female and presents with Breast Cancer    CC  Breast cancer dx 5/13    HPI: Patient is here for 1 mo fu breast cancer currently taking adjuvant Arimidex. mammo / ultrasound at [facility] last month negative. For breast MRI also with [facility]. BCI done and low risk. Pt is here to discuss these results. Otherwise, complete ROS is per the symptom report form which has been scanned into the media section of the electronic medical record. DX   Breast Cancer     STAGE: T1bN0  ER+ her2 neg  oncotype low risk    TREATMENT COURSE: lumpectomy right 5/22/13,  XRT,  Arimidex to stop after 5 years due to low risk BCI. Peter Weber Past Medical History:   Diagnosis Date    Avascular necrosis [facility]) age 45     right hip > replaced    AVN (avascular necrosis of bone) ([facility] [address] age 56       (1997)    left hip > replaced    Breast cancer ([facility] 5/21/2013    Ectopic pregnancy age 44     (26)    Nausea & vomiting     Osteoarthritis of finger(s) age 53       (36)    Osteoporosis age 51   (56)   Joanne Mccarty Other and unspecified hyperlipidemia 7/6/2010    Proctitis     Sarcoidosis age 30   (200)    entirely resolved 1986     Past Surgical History:   Procedure Laterality Date    BONE GRAFT FEMUR HEAD/NECK/RIDGE  age 56    (5)    Left Hip    BREAST SURGERY PROCEDURE UNLISTED      BREST BIOPIES X 4 WITH 1 MALIGNANT.     ENDOSCOPY, COLON, DIAGNOSTIC  age 55   (2006    told to repeat in 5 years Dr. Sigrid Tabares LUMPECTOMY  5/22/2013    BREAST LUMPECTOMY WITH AXILLARY NODE DISSECTION performed by Caity Soliman MD at [place] [address] [address]  age 24      (26)    HX FRACTURE TX  age 51    (56)    5th metatarsal left foot    HX HEENT  2012    LASER SURGERY FOR \"HOLES IN RT & TETACHED RETINA IN LT\"    HX HEENT      EXTRACTION OF WISDOM TEETH    HX HEENT  8/2015    vitrectomy    HX HYSTERECTOMY  age 40       (46)   Ze Mcconnell OOPHORECTOMY  age 41       (65)    RIGHT ONLY    HX ORTHOPAEDIC  2012    LEFT HIP REPLACEMENT    HX ORTHOPAEDIC  1997    LEFT BONE GRAFT DONE    HX ORTHOPAEDIC  1998    RIGHT HIP REVISION    HX ORTHOPAEDIC  2012    RIGHT HIP REVISION    HX REFRACTIVE SURGERY      HX TUBAL LIGATION  age 44      (26)    INCISE FINGER TENDON SHEATH   age 47    (65)    Trigger finger    PELVIS/HIP JOINT SURGERY UNLISTED  age 45     (36)    RIGHT HIP REPLACEMENT    [place] REVISE TOTAL HIP REPLACEMENT  age 53     (56)    right hip    [place] REVISE TOTAL HIP REPLACEMENT  age 65      (2012)    left hip    TREAT ECTOPIC PREG,NON REMVAL  age 44  (26)     Social History     Social History    Marital status:      Spouse name: N/A    Number of children: N/A    Years of education: N/A     Social History Main Topics    Smoking status: Former Smoker     Packs/day: 0.25     Years: 6.00     Quit date: 1/1/1980    Smokeless tobacco: Never Used    Alcohol use 0.5 oz/week     1 Glasses of wine per week    Drug use: No    Sexual activity: Yes     Partners: Male     Birth control/ protection: None     Other Topics Concern    None     Social History Narrative     Family History   Problem Relation Age of Onset    Breast Cancer Mother 67     in situ    Cancer Mother 67     breast ca and esophageal    Heart defect Mother      murmur diag in 19's, never treated    Arthritis-osteo Mother      late 52's early 63's    Osteoporosis Mother 76    Other Mother 78     lupus - declared disease free in 2004    Arrhythmia Mother 80     pacemaker    Hypertension Father     Heart defect Father      murmur idagnosed in 19's    Kidney Disease Father 80     renal stent    High Cholesterol Father     Heart Disease Father      aortic stenosis    Osteoporosis Brother 47    Arthritis-osteo Brother 47    Other Brother      age 34 hypoglycemia & age 47 Raynaud    Heart Disease Maternal Grandfather      developed in 42's   [address] [place] [address] Hypertension Paternal Grandfather     Kidney Disease Paternal Grandfather     Other Maternal Grandmother 71     amyotrophic lateral sclerosis (ALS)/ALS    Hypertension Brother 47    Other Brother 47     GERD    High Cholesterol Brother 47    Other Brother 62     Multilevel DDD    Other Brother 62     Spondylolithesis    Other Brother 62     Servin's Esophagus, Diverticulosis, Gastritis    Other Brother 62     Bladder neck obstruction, swollen adrenal glands, small vessel disease    Cancer Brother 63     Melanoma and Squamous Cell Carcinoma       Current Outpatient Prescriptions   Medication Sig Dispense Refill    anastrozole (ARIMIDEX) 1 mg tablet Take 1 Tab by mouth daily. 90 Tab 3    therapeutic multivitamin (THERAGRAN) tablet Take 1 Tab by mouth daily.  aspirin delayed-release 81 mg tablet Take 1 Tab by mouth daily. 30 Tab 1    OTHER Take 1 Cap by mouth daily. theracran HP daily       Denosumab (PROLIA) 60 mg/mL injection 60 mg by SubCUTAneous route. Every six months      calcium-vitamin D (CALCIUM 500+D) 500 mg(1,250mg) -200 unit per tablet Take 1 Tab by mouth.  LORazepam (ATIVAN) 1 mg tablet Take 1mg lorazepam 30 minutes prior to MRI. Is not a maintenance drug. 1 Tab 0    cephALEXin (KEFLEX) 250 mg capsule TAKE 1 CAPSULE BY MOUTH THREE TIMES DAILY AS DIRECTED FOR UTI SELF START  1       Allergies   Allergen Reactions    Bactrim [Sulfamethoprim Ds] Other (comments)     Skin reaction    Ciprofloxacin Rash    Relafen [Nabumetone] Swelling     Facial Swelling       Review of Systems    A comprehensive review of systems was negative except for:  Per HPI.       Objective:  Visit Vitals    /74    Pulse 66    Temp 97.9 °F (36.6 °C) (Oral)    Resp 16    Ht 5' 1\" (1.549 m)    Wt 105 lb (47.6 kg)    SpO2 97%    BMI 19.84 kg/m2         Physical Exam:   General appearance - alert, well appearing, and in no distress, oriented to person, place, and time and normal appearing weight  Mental status - alert, oriented to person, place, and time, normal mood, behavior, speech, dress, motor activity, and thought processes  EYE-conj clear  Mouth - mucous membranes moist  Neck - supple  Ext-no pedal edema noted  Skin-Warm and dry. No rashes/lesions noted. Neuro -alert, oriented, normal speech, no focal findings or movement disorder noted  Breast -no masses palpable bilaterally    Diagnostic Imaging   reviewed  Results for orders placed in visit on 01/10/18   XR SHOULDER LT AP/LAT MIN 2 V       Results for orders placed during the hospital encounter of 09/07/17   CT HEAD WO CONT    Narrative EXAM:  CT HEAD WO CONT    INDICATION:   Bilateral vision changes for one week    COMPARISON: None. CONTRAST:  None. TECHNIQUE: Unenhanced CT of the head was performed using 5 mm images. Brain and  bone windows were generated. CT dose reduction was achieved through use of a  standardized protocol tailored for this examination and automatic exposure  control for dose modulation. FINDINGS:  The ventricles and sulci are normal in size, shape and configuration and  midline. There is no significant white matter disease. There is no intracranial  hemorrhage, extra-axial collection, mass, mass effect or midline shift. The  basilar cisterns are open. No acute infarct is identified. The bone windows  demonstrate no abnormalities. The visualized portions of the paranasal sinuses  and mastoid air cells are clear. Vascular calcification is noted. Impression IMPRESSION: No acute abnormality.            Lab Results  Reviewed  Per PCP    Lab Results   Component Value Date/Time    WBC 5.0 04/13/2018 01:15 PM    HGB 13.7 04/13/2018 01:15 PM    HCT 40.8 04/13/2018 01:15 PM    PLATELET 261 69/80/6745 01:15 PM    MCV 94 04/13/2018 01:15 PM       Lab Results   Component Value Date/Time    Sodium 141 04/13/2018 01:15 PM    Potassium 4.4 04/13/2018 01:15 PM    Chloride 98 04/13/2018 01:15 PM    CO2 27 04/13/2018 01:15 PM    Anion gap 8 09/07/2017 09:59 AM    Glucose 88 04/13/2018 01:15 PM    BUN 20 04/13/2018 01:15 PM    Creatinine 0.74 04/13/2018 01:15 PM    BUN/Creatinine ratio 27 04/13/2018 01:15 PM    GFR est AA 97 04/13/2018 01:15 PM    GFR est non-AA 84 04/13/2018 01:15 PM    Calcium 9.8 04/13/2018 01:15 PM    AST (SGOT) 25 04/13/2018 01:15 PM    Alk. phosphatase 64 04/13/2018 01:15 PM    Protein, total 6.5 04/13/2018 01:15 PM    Albumin 4.5 04/13/2018 01:15 PM    Globulin 3.5 09/07/2017 09:59 AM    A-G Ratio 2.3 (H) 04/13/2018 01:15 PM    ALT (SGPT) 25 04/13/2018 01:15 PM       Assessment/Plan:     Frederic Welsh is a 80 y.o. female and presents with Breast Cancer    CC  Breast cancer    HPI: Patient is here for breast cancer currently taking adjuvant Arimidex. Doing well. STAGE: T1bN0  ER+ her2 neg  oncotype low risk. TREATMENT COURSE: lumpectomy 5/22/13  XRT. Arimadex. 1.  Stage 1 ER+ Her2 neg Breast cancer s/p lump/ XRT. She has had no evidence of recurrent disease. Patient is tolerating Arimidex with minimal side effects. Will continue. Continue Arimidex for 5 years and then can do BCI as pt wants to know risk of recurrence then. Mammogram good 4/18 and breast MRI for 5/18 per [facility]   Good routine labs. Pt had BCI and low risk. Reviewed with pt today. Pt will finish current script and then stop AI. 2. Right axillary \"lump. \" mammo negative 4/18. For MRI at [facility] tmw.     2.  Osteoporosis  Per endocrine. Remains on prolia. Call if questions. Will follow-up in 12 months, sooner if needed. ICD-10-CM ICD-9-CM    1. Malignant neoplasm of right breast in female, estrogen receptor positive, unspecified site of breast [facility] C50.911 174.9     Z17.0 V86.0    2. History of lumpectomy of right breast Z98.890 V45.89    3. Aromatase inhibitor use Z79.811 V07.52      No orders of the defined types were placed in this encounter.       continue present plan, call if any problems  There are no Patient Instructions on file for this visit. Follow-up Disposition:  Return in about 1 year (around 5/15/2019).     Nba Sims DO

## 2018-05-15 NOTE — MR AVS SNAPSHOT
[address] [place] [address] 
[phone] Patient: Kaity Hodgkin MRN:  [id number] Visit Information Date & Time Provider Department Dept. Phone Encounter #  
 5/15/2018  8:30 AM [address]ia [address]was [address]  Oncology at [facility] [phone] Follow-up Instructions Return in about 1 year (around 5/15/2019). Follow-up and Disposition History Your Appointments 10/25/2018  9:15 AM  
Medicare Physical with Melanie Mcnair MD  
[facility] Appt Note: cpe/last cpe 10.24.17  
 [address] [facility] [id number] [place] [address] [id number] [place] [id number] Upcoming Health Maintenance Date Due Influenza Age 5 to Adult 8/1/2018 DTaP/Tdap/Td series (2 - Td) 10/14/2018 MEDICARE YEARLY EXAM 10/25/2018 GLAUCOMA SCREENING Q2Y 9/3/2019 BREAST CANCER SCRN MAMMOGRAM 4/17/2020 COLONOSCOPY 10/8/2023 Allergies as of 5/15/2018  Review Complete On: 5/15/2018 By: Deana Hankins DO Severity Noted Reaction Type Reactions Bactrim [Sulfamethoprim Ds]  10/18/2016    Other (comments) Skin reaction Ciprofloxacin  10/18/2016    Rash Relafen [Nabumetone] Low 08/18/2011    Swelling Facial Swelling Current Immunizations  Reviewed on 5/15/2018 Name Date Hepatitis A Vaccine 11/1/1999, 11/1/1999 Hepatitis B Vaccine 1/1/2004, 1/1/2000, 1/1/2000 Influenza High Dose Vaccine PF 10/24/2017 Influenza Vaccine 10/18/2016, 10/15/2015, 9/25/2013 PPD 12/14/2004 Pneumococcal Conjugate (PCV-13) 3/10/2016 Pneumococcal Polysaccharide (PPSV-23) 3/13/2017 TD Vaccine 7/5/2006 TDAP Vaccine 10/14/2008 Zoster 4/15/2011 Zoster Recombinant 3/8/2018  Reviewed by Rick Stanley LPN on 3/62/5131 at  8:32 AM  
You Were Diagnosed With   
  
 Codes Comments Malignant neoplasm of right breast in female, estrogen receptor positive, unspecified site of breast ([facility] [address]    -  Primary ICD-10-CM: C50.911, Z17.0 ICD-9-CM: 174.9, V86.0 History of lumpectomy of right breast     ICD-10-CM: Z98.890 ICD-9-CM: V45.89 Aromatase inhibitor use     ICD-10-CM: I54.370 ICD-9-CM: V07.52 Vitals BP Pulse Temp Resp Height(growth percentile) Weight(growth percentile) 131/74 66 97.9 °F (36.6 °C) (Oral) 16 5' 1\" (1.549 m) 105 lb (47.6 kg) SpO2 BMI OB Status Smoking Status 97% 19.84 kg/m2 Hysterectomy Former Smoker Vitals History BMI and BSA Data Body Mass Index Body Surface Area  
 19.84 kg/m 2 1.43 m 2 Preferred Pharmacy Pharmacy Name Phone [id number] [place], [facility] [place] [phone] Your Updated Medication List  
  
   
This list is accurate as of 5/15/18  9:18 AM.  Always use your most recent med list.  
  
  
  
  
 anastrozole 1 mg tablet Commonly known as:  ARIMIDEX Take 1 Tab by mouth daily. aspirin delayed-release 81 mg tablet Take 1 Tab by mouth daily. CALCIUM 500+D 500 mg(1,250mg) -200 unit per tablet Generic drug:  calcium-vitamin D Take 1 Tab by mouth. cephALEXin 250 mg capsule Commonly known as:  Rosaura Taylor TAKE 1 CAPSULE BY MOUTH THREE TIMES DAILY AS DIRECTED FOR UTI SELF START LORazepam 1 mg tablet Commonly known as:  ATIVAN Take 1mg lorazepam 30 minutes prior to MRI. Is not a maintenance drug. OTHER Take 1 Cap by mouth daily. theracran HP daily PROLIA 60 mg/mL injection Generic drug:  denosumab 60 mg by SubCUTAneous route. Every six months  
  
 therapeutic multivitamin tablet Commonly known as:  [facility] Take 1 Tab by mouth daily. Follow-up Instructions Return in about 1 year (around 5/15/2019). Introducing [facility]!    
 Dear Oswaldo Taylor: 
 Thank you for requesting a [facility] account. Our records indicate that you already have an active [facility] account. You can access your account anytime at [email or web address] Did you know that you can access your hospital and ER discharge instructions at any time in [facility]? You can also review all of your test results from your hospital stay or ER visit. Additional Information If you have questions, please visit the Frequently Asked Questions section of the [facility] website at [email or web address] Remember, [facility] is NOT to be used for urgent needs. For medical emergencies, dial 911. Now available from your iPhone and Android! Please provide this summary of care documentation to your next provider. Your primary care clinician is listed as Darlin Ward. If you have any questions after today's visit, please call [phone].

## 2018-09-17 DIAGNOSIS — M81.0 OSTEOPOROSIS, UNSPECIFIED OSTEOPOROSIS TYPE, UNSPECIFIED PATHOLOGICAL FRACTURE PRESENCE: ICD-10-CM

## 2018-09-17 DIAGNOSIS — E78.00 PURE HYPERCHOLESTEROLEMIA: Primary | ICD-10-CM

## 2018-10-11 ENCOUNTER — HOSPITAL ENCOUNTER (OUTPATIENT)
Dept: LAB | Age: 68
Discharge: HOME | End: 2018-10-11
Payer: MEDICARE

## 2018-10-11 PROCEDURE — 36415 COLL VENOUS BLD VENIPUNCTURE: CPT

## 2018-10-11 PROCEDURE — 82306 VITAMIN D 25 HYDROXY: CPT

## 2018-10-11 PROCEDURE — 80053 COMPREHEN METABOLIC PANEL: CPT

## 2018-10-11 PROCEDURE — 85025 COMPLETE CBC W/AUTO DIFF WBC: CPT

## 2018-10-11 PROCEDURE — 80061 LIPID PANEL: CPT

## 2018-10-12 LAB
25(OH)D3+25(OH)D2 SERPL-MCNC: 54.8 NG/ML (ref 30–100)
ALBUMIN SERPL-MCNC: 4.5 G/DL (ref 3.6–4.8)
ALBUMIN/GLOB SERPL: 2.1 {RATIO} (ref 1.2–2.2)
ALP SERPL-CCNC: 69 IU/L (ref 39–117)
ALT SERPL-CCNC: 21 IU/L (ref 0–32)
AST SERPL-CCNC: 26 IU/L (ref 0–40)
BASOPHILS # BLD AUTO: 0 X10E3/UL (ref 0–0.2)
BASOPHILS NFR BLD AUTO: 1 %
BILIRUB SERPL-MCNC: 0.4 MG/DL (ref 0–1.2)
BUN SERPL-MCNC: 17 MG/DL (ref 8–27)
BUN/CREAT SERPL: 21 (ref 12–28)
CALCIUM SERPL-MCNC: 9.8 MG/DL (ref 8.7–10.3)
CHLORIDE SERPL-SCNC: 102 MMOL/L (ref 96–106)
CHOLEST SERPL-MCNC: 242 MG/DL (ref 100–199)
CO2 SERPL-SCNC: 24 MMOL/L (ref 20–29)
CREAT SERPL-MCNC: 0.82 MG/DL (ref 0.57–1)
EOSINOPHIL # BLD AUTO: 0 X10E3/UL (ref 0–0.4)
EOSINOPHIL NFR BLD AUTO: 1 %
ERYTHROCYTE [DISTWIDTH] IN BLOOD BY AUTOMATED COUNT: 13.4 % (ref 12.3–15.4)
GLOBULIN SER CALC-MCNC: 2.1 G/DL (ref 1.5–4.5)
GLUCOSE SERPL-MCNC: 84 MG/DL (ref 65–99)
HCT VFR BLD AUTO: 40.7 % (ref 34–46.6)
HDLC SERPL-MCNC: 73 MG/DL
HGB BLD-MCNC: 13.7 G/DL (ref 11.1–15.9)
IMM GRANULOCYTES # BLD: 0 X10E3/UL (ref 0–0.1)
IMM GRANULOCYTES NFR BLD: 0 %
LDLC SERPL CALC-MCNC: 147 MG/DL (ref 0–99)
LYMPHOCYTES # BLD AUTO: 1.4 X10E3/UL (ref 0.7–3.1)
LYMPHOCYTES NFR BLD AUTO: 28 %
MCH RBC QN AUTO: 31.3 PG (ref 26.6–33)
MCHC RBC AUTO-ENTMCNC: 33.7 G/DL (ref 31.5–35.7)
MCV RBC AUTO: 93 FL (ref 79–97)
MONOCYTES # BLD AUTO: 0.4 X10E3/UL (ref 0.1–0.9)
MONOCYTES NFR BLD AUTO: 8 %
NEUTROPHILS # BLD AUTO: 3.2 X10E3/UL (ref 1.4–7)
NEUTROPHILS NFR BLD AUTO: 62 %
PLATELET # BLD AUTO: 293 X10E3/UL (ref 150–379)
POTASSIUM SERPL-SCNC: 4.4 MMOL/L (ref 3.5–5.2)
PROT SERPL-MCNC: 6.6 G/DL (ref 6–8.5)
RBC # BLD AUTO: 4.38 X10E6/UL (ref 3.77–5.28)
SODIUM SERPL-SCNC: 143 MMOL/L (ref 134–144)
TRIGL SERPL-MCNC: 112 MG/DL (ref 0–149)
VLDLC SERPL CALC-MCNC: 22 MG/DL (ref 5–40)
WBC # BLD AUTO: 5.1 X10E3/UL (ref 3.4–10.8)

## 2018-10-25 ENCOUNTER — OFFICE VISIT (OUTPATIENT)
Dept: INTERNAL MEDICINE CLINIC | Age: 68
End: 2018-10-25

## 2018-10-25 VITALS
BODY MASS INDEX: 20.2 KG/M2 | DIASTOLIC BLOOD PRESSURE: 62 MMHG | HEIGHT: 61 IN | HEART RATE: 78 BPM | TEMPERATURE: 97.6 F | WEIGHT: 107 LBS | RESPIRATION RATE: 16 BRPM | SYSTOLIC BLOOD PRESSURE: 124 MMHG | OXYGEN SATURATION: 96 %

## 2018-10-25 DIAGNOSIS — M81.0 OSTEOPOROSIS, UNSPECIFIED OSTEOPOROSIS TYPE, UNSPECIFIED PATHOLOGICAL FRACTURE PRESENCE: ICD-10-CM

## 2018-10-25 DIAGNOSIS — Z13.31 SCREENING FOR DEPRESSION: ICD-10-CM

## 2018-10-25 DIAGNOSIS — E78.00 PURE HYPERCHOLESTEROLEMIA: ICD-10-CM

## 2018-10-25 DIAGNOSIS — Z85.3 HISTORY OF RIGHT BREAST CANCER: ICD-10-CM

## 2018-10-25 DIAGNOSIS — Z13.39 SCREENING FOR ALCOHOLISM: ICD-10-CM

## 2018-10-25 DIAGNOSIS — Z00.00 MEDICARE ANNUAL WELLNESS VISIT, SUBSEQUENT: Primary | ICD-10-CM

## 2018-10-25 NOTE — PATIENT INSTRUCTIONS
Medicare Wellness Visit, Female     The best way to live healthy is to have a lifestyle where you eat a well-balanced diet, exercise regularly, limit alcohol use, and quit all forms of tobacco/nicotine, if applicable. Regular preventive services are another way to keep healthy. Preventive services (vaccines, screening tests, monitoring & exams) can help personalize your care plan, which helps you manage your own care. Screening tests can find health problems at the earliest stages, when they are easiest to treat. [facility] follows the current, evidence-based guidelines published by the [facility] ([facility]) when recommending preventive services for our patients. Because we follow these guidelines, sometimes recommendations change over time as research supports it. (For example, mammograms used to be recommended annually. Even though Medicare will still pay for an annual mammogram, the newer guidelines recommend a mammogram every two years for women of average risk.)  Of course, you and your doctor may decide to screen more often for some diseases, based on your risk and your health status. Preventive services for you include:  - Medicare offers their members a free annual wellness visit, which is time for you and your primary care provider to discuss and plan for your preventive service needs. Take advantage of this benefit every year!  -All adults over the age of 72 should receive the recommended pneumonia vaccines. Current USPSTF guidelines recommend a series of two vaccines for the best pneumonia protection.   -All adults should have a flu vaccine yearly and a tetanus vaccine every 10 years. All adults age 61 and older should receive a shingles vaccine once in their lifetime.    -A bone mass density test is recommended when a woman turns 65 to screen for osteoporosis. This test is only recommended one time, as a screening.  Some providers will use this same test as a disease monitoring tool if you already have osteoporosis. -All adults age 38-68 who are overweight should have a diabetes screening test once every three years.   -Other screening tests and preventive services for persons with diabetes include: an eye exam to screen for diabetic retinopathy, a kidney function test, a foot exam, and stricter control over your cholesterol.   -Cardiovascular screening for adults with routine risk involves an electrocardiogram (ECG) at intervals determined by your doctor.   -Colorectal cancer screenings should be done for adults age 54-65 with no increased risk factors for colorectal cancer. There are a number of acceptable methods of screening for this type of cancer. Each test has its own benefits and drawbacks. Discuss with your doctor what is most appropriate for you during your annual wellness visit. The different tests include: colonoscopy (considered the best screening method), a fecal occult blood test, a fecal DNA test, and sigmoidoscopy. -Breast cancer screenings are recommended every other year for women of normal risk, age 54-69.  -Cervical cancer screenings for women over age 72 are only recommended with certain risk factors.   -All adults born between [facility] should be screened once for Hepatitis C.      Here is a list of your current Health Maintenance items (your personalized list of preventive services) with a due date:  Health Maintenance Due   Topic Date Due    DTaP/Tdap/Td  (2 - Td) 10/14/2018    Annual Well Visit  10/25/2018

## 2018-10-25 NOTE — PROGRESS NOTES
Chief Complaint   Patient presents with    Annual Wellness Visit     physical     Patient states she is here for her AWV. Patient stated that on Sept 17 2018 she was given Reclast infusion and she is to follow up with endocrinology in Sept 2019.

## 2018-10-25 NOTE — PROGRESS NOTES
This is the Subsequent Medicare Annual Wellness Exam, performed 12 months or more after the Initial AWV or the last Subsequent AWV    I have reviewed the patient's medical history in detail and updated the computerized patient record. colonoscopy scheduled for next month with Dr Wendy Yang. History     Past Medical History:   Diagnosis Date    Avascular necrosis [facility]) age 45     right hip > replaced    AVN (avascular necrosis of bone) ([facility] [address] age 56       (1997)    left hip > replaced    Breast cancer ([facility] [address] 5/21/2013    Ectopic pregnancy age 44     (26)    Nausea & vomiting     Osteoarthritis of finger(s) age 53       (36)    Osteoporosis age 51   (56)   [facility] Other and unspecified hyperlipidemia 7/6/2010    Proctitis     Sarcoidosis age 30   (200)    entirely resolved 1986      Past Surgical History:   Procedure Laterality Date    BONE GRAFT FEMUR HEAD/NECK/RIDGE  age 56    (5)    Left Hip    BREAST SURGERY PROCEDURE UNLISTED      BREST BIOPIES X 4 WITH 1 MALIGNANT.     ENDOSCOPY, COLON, DIAGNOSTIC  age 55   (2006    told to repeat in 5 years Dr. Keo Garcia  age 24      (26)    HX FRACTURE TX  age 51    (56)    5th metatarsal left foot    HX HEENT  2012    LASER SURGERY FOR \"HOLES IN RT & TETACHED RETINA IN LT\"    HX HEENT      EXTRACTION OF WISDOM TEETH    HX HEENT  8/2015    vitrectomy    HX HYSTERECTOMY  age 40       (46)   Damian Geo OOPHORECTOMY  age 41       (65)    RIGHT ONLY    HX ORTHOPAEDIC  2012    LEFT HIP REPLACEMENT    HX ORTHOPAEDIC  1997    LEFT BONE GRAFT DONE    HX ORTHOPAEDIC  1998    RIGHT HIP REVISION    HX ORTHOPAEDIC  2012    RIGHT HIP REVISION    HX REFRACTIVE SURGERY      HX TUBAL LIGATION  age 44      (26)    INCISE FINGER TENDON SHEATH   age 47    (65)    Trigger finger    PELVIS/HIP JOINT SURGERY UNLISTED  age 45     (36)    RIGHT HIP REPLACEMENT    NE REVISE TOTAL HIP REPLACEMENT  age 53     (56)    right hip    NE REVISE TOTAL HIP REPLACEMENT  age 65      (2012)    left hip    TREAT ECTOPIC PREG,NON REMVAL  age 44  (26)     Current Outpatient Medications   Medication Sig Dispense Refill    therapeutic multivitamin (THERAGRAN) tablet Take 1 Tab by mouth daily.  aspirin delayed-release 81 mg tablet Take 1 Tab by mouth daily. 30 Tab 1    OTHER Take 1 Cap by mouth daily. theracran HP daily       calcium-vitamin D (CALCIUM 500+D) 500 mg(1,250mg) -200 unit per tablet Take 1 Tab by mouth.        Allergies   Allergen Reactions    Bactrim [Sulfamethoprim Ds] Other (comments)     Skin reaction    Ciprofloxacin Rash    Relafen [Nabumetone] Swelling     Facial Swelling     Family History   Problem Relation Age of Onset    Breast Cancer Mother 67        in situ    Cancer Mother 67        breast ca and esophageal    Heart defect Mother         murmur diag in 19's, never treated    Arthritis-osteo Mother         late 52's early 63's    Osteoporosis Mother 76    Other Mother 78        lupus - declared disease free in 2004    Arrhythmia Mother 80        pacemaker    Hypertension Father     Heart defect Father         murmur idagnosed in 19's    Kidney Disease Father 80        renal stent    High Cholesterol Father     Heart Disease Father         aortic stenosis    Osteoporosis Brother 47    Arthritis-osteo Brother 47    Other Brother         age 34 hypoglycemia & age 47 Raynaud    Heart Disease Maternal Grandfather         developed in 42's    Hypertension Paternal Grandfather     Kidney Disease Paternal Grandfather     Other Maternal Grandmother 71        amyotrophic lateral sclerosis (ALS)/ALS    Hypertension Brother 47    Other Brother 47        GERD    High Cholesterol Brother 47    Other Brother 62        Multilevel DDD    Other Brother 62        Spondylolithesis    Other Brother 62        Servin's Esophagus, Diverticulosis, Gastritis    Other Brother 62        Bladder neck obstruction, swollen adrenal glands, small vessel disease    Cancer Brother 61        Melanoma and Squamous Cell Carcinoma     Social History     Tobacco Use    Smoking status: Former Smoker     Packs/day: 0.25     Years: 6.00     Pack years: 1.50     Last attempt to quit: 1980     Years since quittin.8    Smokeless tobacco: Never Used   Substance Use Topics    Alcohol use: Yes     Alcohol/week: 0.5 oz     Types: 1 Glasses of wine per week     Patient Active Problem List   Diagnosis Code    Hyperlipidemia E78.5    Osteoporosis M81.0    Breast cancer ([facility] [address] C50.919    S/P lumpectomy, right breast Z98.890    Aromatase inhibitor use Z79.811    Allergic cough R05    Vaginal dryness, menopausal N95.1    History of right breast cancer Z85.3    History of lumpectomy of right breast Z98.890       Depression Risk Factor Screening:     PHQ over the last two weeks 10/25/2018   Little interest or pleasure in doing things Not at all   Feeling down, depressed, irritable, or hopeless Not at all   Total Score PHQ 2 0     Alcohol Risk Factor Screening:   Reviewed and not at risk. Functional Ability and Level of Safety:   Hearing Loss  Hearing is good. Activities of Daily Living  The home contains: no safety equipment. Patient does total self care    Fall Risk  Fall Risk Assessment, last 12 mths 10/25/2018   Able to walk? Yes   Fall in past 12 months? No       Abuse Screen  Patient is not abused    Cognitive Screening   Evaluation of Cognitive Function:  Has your family/caregiver stated any concerns about your memory: no  Normal    Patient Care Team   Patient Care Team:  Etta Edgar MD as PCP - Angela Rubi MD (Urology)  Keo Glez MD (Urology)    Assessment/Plan   Education and counseling provided:  Are appropriate based on today's review and evaluation      Health Maintenance Due   Topic Date Due    DTaP/Tdap/Td series (2 - Td) 10/14/2018     HPI:  Lynsey Kennedy is also here for follow up.     1. Cardiovascular: hypercholesterolemia .  Diet and Lifestyle: generally follows a low fat low cholesterol diet, generally follows a low sodium diet. Exercise: 3 days a week wt lifting and cardio. 2. History of right breast cancer diagnosed 4 1/2 years ago and followed by Dr Krunal Cleaning. Her arimidex has been stopped. 3. Osteoporosis: followed by Dr Maddox [facility] and taking calcium and vit D. Taking medication: reclast . No longer prolia. Current Outpatient Medications   Medication Sig Dispense Refill    therapeutic multivitamin (THERAGRAN) tablet Take 1 Tab by mouth daily.  aspirin delayed-release 81 mg tablet Take 1 Tab by mouth daily. 30 Tab 1    OTHER Take 1 Cap by mouth daily. theracran HP daily       calcium-vitamin D (CALCIUM 500+D) 500 mg(1,250mg) -200 unit per tablet Take 1 Tab by mouth. Allergies   Allergen Reactions    Bactrim [Sulfamethoprim Ds] Other (comments)     Skin reaction    Ciprofloxacin Rash    Relafen [Nabumetone] Swelling     Facial Swelling     Social History     Tobacco Use    Smoking status: Former Smoker     Packs/day: 0.25     Years: 6.00     Pack years: 1.50     Last attempt to quit: 1980     Years since quittin.8    Smokeless tobacco: Never Used   Substance Use Topics    Alcohol use: Yes     Alcohol/week: 0.5 oz     Types: 1 Glasses of wine per week         Review of Systems   Constitutional: Negative for chills, fever and malaise/fatigue. HENT: Negative for congestion and sore throat. Eyes: Negative for blurred vision and double vision. Respiratory: Negative for cough, shortness of breath and wheezing. Cardiovascular: Negative for chest pain, palpitations and leg swelling. Gastrointestinal: Negative for abdominal pain, blood in stool, constipation, diarrhea, heartburn, nausea and vomiting. Genitourinary: Negative for dysuria, frequency and urgency. Musculoskeletal: Negative for back pain. Skin: Negative for rash.    Neurological: Negative for dizziness, sensory change, focal weakness and headaches. Psychiatric/Behavioral: Negative for depression. The patient is not nervous/anxious. Physical Exam    Visit Vitals  /62   Pulse 78   Temp 97.6 °F (36.4 °C) (Oral)   Resp 16   Ht 5' 1\" (1.549 m)   Wt 107 lb (48.5 kg)   SpO2 96%   BMI 20.22 kg/m²     GENERAL:  Pleasant female in no apparent distress. HEENT:  Normocephalic, atraumatic. Sinuses nontender. Posterior Pharynx clear, no erythema. NECK:  Supple, full range of motion. No thyromegaly or adenopathy. No carotid bruits auscultated. BACK:  Nontender. RESPIRATORY:  Lungs clear to auscultation bilaterally without wheezes or crackles. CARDIAC:  Regular rate and rhythm, no murmurs, rubs or gallops. BREASTS:  No dimples, retraction, color changes, nipple discharge, or masses present. No supra- or infraclavicular or axillary nodes palpable. ABDOMEN:  Soft, nontender. Positive bowel sounds. No masses. No hepatosplenomegaly. EXTREMITIES: Peripheral pulses are symmetric and palpable. No cyanosis, clubbing, or edema. NEUROLOGIC:  Nonfocal.   SKIN: Normal appearance. Assessment & Plan:    ICD-10-CM ICD-9-CM    1. Medicare annual wellness visit, subsequent  Counseled continue healthy lifestyle, exercise, diet and weight. Health maintenance reviewed and updated with patient today at visit. Due for Tdap and will have this done at her pharmacy. Z00.00 V70.0    2. Screening for alcoholism Z13.39 V79.1 VT ANNUAL ALCOHOL SCREEN 15 MIN   3. Screening for depression Z13.31 V79.0 DEPRESSION SCREEN ANNUAL   4. Pure hypercholesterolemia  Reviewed recent lab work. Continue with healthy lifestyle low-fat low-cholesterol. E78.00 272.0    5. Osteoporosis, unspecified osteoporosis type, unspecified pathological fracture presence  Recently treated with Reclast.  Followed by specialist.  Continue with calcium, vitamin D and diet plus supplement and exercise. M81.0 733.00    6.  History of right breast cancer  No evidence of recurrence and aromatase inhibitor has been discontinued. Z85.3 V10.3         Follow-up Disposition:  Return in about 1 year (around 10/25/2019) for physical.   Dary Andres her to call back or return to office if symptoms worsen/change/persist.  Discussed expected course/resolution/complications of diagnosis in detail with patient. Medication risks/benefits/costs/interactions/alternatives discussed with patient. She was given an after visit summary which includes diagnoses, current medications, & vitals. She expressed understanding with the diagnosis and plan.

## 2019-05-07 ENCOUNTER — TELEPHONE (OUTPATIENT)
Dept: ONCOLOGY | Age: 69
End: 2019-05-07

## 2019-05-07 DIAGNOSIS — F40.240 CLAUSTROPHOBIA: ICD-10-CM

## 2019-05-07 DIAGNOSIS — F41.8 ANXIETY ABOUT HEALTH: Primary | ICD-10-CM

## 2019-05-07 RX ORDER — LORAZEPAM 1 MG/1
TABLET ORAL
Qty: 1 TAB | Refills: 0 | Status: SHIPPED | OUTPATIENT
Start: 2019-05-07 | End: 2020-05-22 | Stop reason: SDUPTHER

## 2019-05-10 ENCOUNTER — PATIENT MESSAGE (OUTPATIENT)
Dept: INTERNAL MEDICINE CLINIC | Age: 69
End: 2019-05-10

## 2019-05-10 DIAGNOSIS — Z01.84 IMMUNITY STATUS TESTING: Primary | ICD-10-CM

## 2019-05-13 NOTE — TELEPHONE ENCOUNTER
From: Toribio Dance  Sent: 5/10/2019 5:34 PM EDT  To: Matilde Pantoja [place]  Subject: RE: Non-Urgent Medical Question    ----- Message from [place], Generic sent at 5/10/2019 5:34 PM EDT -----    Her doctor said that she wasn't sure I could have the vaccination because of my health history and age. My thought is to get the testing done regardless. If my having the vaccine poses a concern for my daughter obviously I wouldn't get it but in this current climate of outbreak I am wondering if should at least know if I have had the disease or not. If testing shows that I did not have the measles and that Dr. Linn Maki believes I should get the shot, I would confirm the decision with my daughter's doctor before I did receive it. What do you think?  ----- Message -----  From: Cherry Dickerson LPN  Sent: 3/31/5153 5:22 PM EDT  To: Toribio Dance  Subject: RE: Non-Urgent Medical Question  Please have your daughter talk to her doctor about being around someone that had the vaccine to see what they say. It all depends on what they say. We can do the titers if you like.    ----- Message -----   From: Toribio Dance   Sent: 5/10/2019 5:18 PM EDT   To: Jorje Mejia MD  Subject: Non-Urgent Medical Question    Hello. To my knowledge I have never had the measles. My daughter has lupus. Because her immune system is compromised I would like to get the blood work done to confirm whether I have had the measles or not and then discuss with you as to whether I should or even can (due to my cancer history) get the shot. I can also ask my oncologist about this as well if you think it is necessary. Thanks.

## 2019-05-13 NOTE — TELEPHONE ENCOUNTER
Per verbal order Dr. Kelli Morrow, measles titer ordered and placed up front for patient to . She was notified via [employer].

## 2019-05-15 ENCOUNTER — TELEPHONE (OUTPATIENT)
Dept: INTERNAL MEDICINE CLINIC | Age: 69
End: 2019-05-15

## 2019-05-15 NOTE — TELEPHONE ENCOUNTER
Verified patient identity with two identifiers. Spoke with patient by phone stated she was at urologist earlier this week and BP was elevated. Approx reading was 176/72 at urologist.  Patient took BP at home with all he correct parameters with feet on floor/ arm heart height and back supported and her reading was 121/64. Patient instructed to keep a log with daily BP x one week and call us with the readings. Phone office if SBP > 140. Patient verbalized understanding.

## 2019-05-28 ENCOUNTER — HOSPITAL ENCOUNTER (OUTPATIENT)
Dept: LAB | Age: 69
Discharge: HOME | End: 2019-05-28
Payer: MEDICARE

## 2019-05-28 PROCEDURE — 86765 RUBEOLA ANTIBODY: CPT

## 2019-05-28 PROCEDURE — 36415 COLL VENOUS BLD VENIPUNCTURE: CPT

## 2019-05-29 LAB — MEV IGG SER IA-ACNC: 167 AU/ML

## 2019-06-26 ENCOUNTER — OFFICE VISIT (OUTPATIENT)
Dept: INTERNAL MEDICINE CLINIC | Age: 69
End: 2019-06-26

## 2019-06-26 ENCOUNTER — TELEPHONE (OUTPATIENT)
Dept: INTERNAL MEDICINE CLINIC | Age: 69
End: 2019-06-26

## 2019-06-26 ENCOUNTER — HOSPITAL ENCOUNTER (OUTPATIENT)
Dept: GENERAL RADIOLOGY | Age: 69
Discharge: HOME | End: 2019-06-26
Attending: INTERNAL MEDICINE
Payer: MEDICARE

## 2019-06-26 VITALS
OXYGEN SATURATION: 98 % | DIASTOLIC BLOOD PRESSURE: 58 MMHG | RESPIRATION RATE: 16 BRPM | HEIGHT: 61 IN | HEART RATE: 68 BPM | TEMPERATURE: 97.6 F | BODY MASS INDEX: 20.2 KG/M2 | WEIGHT: 107 LBS | SYSTOLIC BLOOD PRESSURE: 132 MMHG

## 2019-06-26 DIAGNOSIS — M54.12 CERVICAL RADICULOPATHY: Primary | ICD-10-CM

## 2019-06-26 DIAGNOSIS — M54.12 CERVICAL RADICULOPATHY: ICD-10-CM

## 2019-06-26 PROBLEM — Z98.890 HISTORY OF LUMPECTOMY OF RIGHT BREAST: Status: RESOLVED | Noted: 2018-05-15 | Resolved: 2019-06-26

## 2019-06-26 PROCEDURE — 72050 X-RAY EXAM NECK SPINE 4/5VWS: CPT

## 2019-06-26 RX ORDER — BISMUTH SUBSALICYLATE 262 MG
1 TABLET,CHEWABLE ORAL DAILY
COMMUNITY

## 2019-06-26 NOTE — PATIENT INSTRUCTIONS
Pinched Nerve in the Neck: Care Instructions  Your Care Instructions  A pinched nerve in the neck happens when a vertebra or disc in the upper part of your spine is damaged. This damage can happen because of an injury. Or it can just happen with age. The changes caused by the damage may put pressure on a nearby nerve root, pinching it. This causes symptoms such as sharp pain in your neck, shoulder, arm, hand, or back. You may also have tingling or numbness. Sometimes it makes your arm weaker. The symptoms are usually worse when you turn your head or strain your neck. For many people, the symptoms get better over time and finally go away. Early treatment usually includes medicines for pain and swelling. Sometimes physical therapy and special exercises may help. Follow-up care is a key part of your treatment and safety. Be sure to make and go to all appointments, and call your doctor if you are having problems. It's also a good idea to know your test results and keep a list of the medicines you take. How can you care for yourself at home? · Be safe with medicines. Read and follow all instructions on the label. ? If the doctor gave you a prescription medicine for pain, take it as prescribed. ? If you are not taking a prescription pain medicine, ask your doctor if you can take an over-the-counter medicine. · Try using a heating pad on a low or medium setting for 15 to 20 minutes every 2 or 3 hours. Try a warm shower in place of one session with the heating pad. You can also buy single-use heat wraps that last up to 8 hours. · You can also try an ice pack for 10 to 15 minutes every 2 to 3 hours. There isn't strong evidence that either heat or ice will help. But you can try them to see if they help you. · Don't spend too long in one position. Take short breaks to move around and change positions. · Wear a seat belt and shoulder harness when you are in a car.   · Sleep with a pillow under your head and neck that keeps your neck straight. · If you were given a neck brace (cervical collar) to limit neck motion, wear it as instructed for as many days as your doctor tells you to. Do not wear it longer than you were told to. Wearing a brace for too long can lead to neck stiffness and can weaken the neck muscles. · Follow your doctor's instructions for gentle neck-stretching exercises. · Do not smoke. Smoking can slow healing of your discs. If you need help quitting, talk to your doctor about stop-smoking programs and medicines. These can increase your chances of quitting for good. · Avoid strenuous work or exercise until your doctor says it is okay. When should you call for help? Call 911 anytime you think you may need emergency care. For example, call if:    · You are unable to move an arm or a leg at all.   [facility] your doctor now or seek immediate medical care if:    · You have new or worse symptoms in your arms, legs, chest, belly, or buttocks. Symptoms may include:  ? Numbness or tingling. ? Weakness. ? Pain.     · You lose bladder or bowel control.    Watch closely for changes in your health, and be sure to contact your doctor if:    · You are not getting better as expected. Where can you learn more? Go to http://sharri-flakito.info/. Enter B606 in the search box to learn more about \"Pinched Nerve in the Neck: Care Instructions. \"  Current as of: September 20, 2018  Content Version: 11.9  © 8023-3643 [employer]. Care instructions adapted under license by [employer] (which disclaims liability or warranty for this information). If you have questions about a medical condition or this instruction, always ask your healthcare professional. [place] [address] any warranty or liability for your use of this information. Neck Spasm: Exercises  Your Care Instructions  Here are some examples of typical rehabilitation exercises for your condition.  Start each exercise slowly. Ease off the exercise if you start to have pain. Your doctor or physical therapist will tell you when you can start these exercises and which ones will work best for you. How to do the exercises  Levator scapula stretch    1. Sit in a firm chair, or stand up straight. 2. Gently tilt your head toward your left shoulder. 3. Turn your head to look down into your armpit, bending your head slightly forward. Let the weight of your head stretch your neck muscles. 4. Hold for 15 to 30 seconds. 5. Return to your starting position. 6. Follow the same instructions above, but tilt your head toward your right shoulder. 7. Repeat 2 to 4 times toward each shoulder. Upper trapezius stretch    1. Sit in a firm chair, or stand up straight. 2. This stretch works best if you keep your shoulder down as you lean away from it. To help you remember to do this, start by relaxing your shoulders and lightly holding on to your thighs or your chair. 3. Tilt your head toward your shoulder and hold for 15 to 30 seconds. Let the weight of your head stretch your muscles. 4. If you would like a little added stretch, place your arm behind your back. Use the arm opposite of the direction you are tilting your head. For example, if you are tilting your head to the left, place your right arm behind your back. 5. Repeat 2 to 4 times toward each shoulder. Neck rotation    1. Sit in a firm chair, or stand up straight. 2. Keeping your chin level, turn your head to the right, and hold for 15 to 30 seconds. 3. Turn your head to the left, and hold for 15 to 30 seconds. 4. Repeat 2 to 4 times to each side. Chin tuck    1. Lie on the floor with a rolled-up towel under your neck. Your head should be touching the floor. 2. Slowly bring your chin toward the front of your neck. 3. Hold for a count of 6, and then relax for up to 10 seconds. 4. Repeat 8 to 12 times. Forward neck flexion    1.  Sit in a firm chair, or stand up straight. 2. Bend your head forward. 3. Hold for 15 to 30 seconds, then return to your starting position. 4. Repeat 2 to 4 times. Follow-up care is a key part of your treatment and safety. Be sure to make and go to all appointments, and call your doctor if you are having problems. It's also a good idea to know your test results and keep a list of the medicines you take. Where can you learn more? Go to http://sharri-flakito.info/. Enter P962 in the search box to learn more about \"Neck Spasm: Exercises. \"  Current as of: September 20, 2018  Content Version: 11.9  © 3893-2897 [employer]. Care instructions adapted under license by [employer] (which disclaims liability or warranty for this information). If you have questions about a medical condition or this instruction, always ask your healthcare professional. [address] any warranty or liability for your use of this information.

## 2019-06-26 NOTE — PROGRESS NOTES
HPI:  Ted Sebastian is a 77y.o. year old female who returns to clinic today for an acute visit:   Notes tingling and pins and needles sensation down her right arm. Symptoms started 10 days ago. No injury. She does do some upper arm strengthening and did uses a machine recently and may have strained. Denies any neck or back pain but feels some tightness aching in her right cervical region. Current Outpatient Medications   Medication Sig Dispense Refill    multivitamin (ONE A DAY) tablet Take 1 Tab by mouth daily.  therapeutic multivitamin (THERAGRAN) tablet Take 1 Tab by mouth daily.  aspirin delayed-release 81 mg tablet Take 1 Tab by mouth daily. 30 Tab 1    calcium-vitamin D (CALCIUM 500+D) 500 mg(1,250mg) -200 unit per tablet Take 1 Tab by mouth.  LORazepam (ATIVAN) 1 mg tablet Take 1 tab by mouth 30 minutes prior to MRI 1 Tab 0     Allergies   Allergen Reactions    Bactrim [Sulfamethoprim Ds] Other (comments)     Skin reaction    Ciprofloxacin Rash    Relafen [Nabumetone] Swelling     Facial Swelling     Social History     Tobacco Use    Smoking status: Former Smoker     Packs/day: 0.25     Years: 6.00     Pack years: 1.50     Last attempt to quit: 1980     Years since quittin.5    Smokeless tobacco: Never Used   Substance Use Topics    Alcohol use: Yes     Alcohol/week: 0.5 oz     Types: 1 Glasses of wine per week         Review of Systems   Constitutional: Negative for chills and fever. Respiratory: Negative for shortness of breath. Cardiovascular: Negative for chest pain. Physical Exam   Constitutional: She appears well-developed. No distress. HENT:   Head: Normocephalic and atraumatic. Cardiovascular: Normal rate, regular rhythm and intact distal pulses. Pulmonary/Chest: Effort normal and breath sounds normal.   Abdominal: Soft. Bowel sounds are normal. There is no tenderness. Musculoskeletal:        Back:    Neurological:   Right upper arm motor 5-/5.  Sensory intact. DTRs upper extremity intact. Nursing note and vitals reviewed. Visit Vitals  /58 (BP 1 Location: Left arm)   Pulse 68   Temp 97.6 °F (36.4 °C) (Oral)   Resp 16   Ht 5' 1\" (1.549 m)   Wt 107 lb (48.5 kg)   SpO2 98%   BMI 20.22 kg/m²          Assessment & Plan:    ICD-10-CM ICD-9-CM    1. Cervical radiculopathy M54.12 723.4 XR SPINE CERV 4 OR 5 V   declines medrol dose mirta due to concerns of side effects. She will start twice daily with food. Discussed side effects of medication and will call us if any problems on medication. She will contact me if any worsening of symptoms. Recommend that she not do any weight lifting with her upper extremities at this time. Given gentle to her neck exercises. Orders Placed This Encounter    XR SPINE CERV 4 OR 5 V    multivitamin (ONE A DAY) tablet         Advised her to call back or return to office if symptoms worsen/change/persist.  Discussed expected course/resolution/complications of diagnosis in detail with patient. Medication risks/benefits/costs/interactions/alternatives discussed with patient. She was given an after visit summary which includes diagnoses, current medications, & vitals. She expressed understanding with the diagnosis and plan.

## 2019-06-27 NOTE — PROGRESS NOTES
Future [employer] message sent. Cervical spondylosis with right arm symptoms and if not improving over the next 1 to 2 weeks with use of anti-inflammatories and rest will refer on to neurosurgery or orthopedics.

## 2019-07-15 ENCOUNTER — TELEPHONE (OUTPATIENT)
Dept: INTERNAL MEDICINE CLINIC | Age: 69
End: 2019-07-15

## 2019-07-15 DIAGNOSIS — M79.601 RIGHT ARM PAIN: Primary | ICD-10-CM

## 2019-07-15 NOTE — TELEPHONE ENCOUNTER
Regarding: FW: Update Medical Information  Contact: [phone]  Please refer patient to Dr. Armani Negron in orthopedics for evaluation of her right arm pins and needles symptoms. ----- Message -----  From: Thereasa Fothergill, LPN  Sent: 2/55/0814   8:58 AM  To: Ladd Osgood, MD  Subject: FW: Update Medical Information                       ----- Message -----  From: Sarah Black  Sent: 7/14/2019   8:44 AM  To: [facility]  Subject: Update Medical Information                       ----- Message from [address] [place] [address] [id number], [facility] sent at 7/14/2019  8:44 AM EDT -----    Hello. Right arm tingling, pins-and-needles update:    I completed the course of house brand Aleve on July 4. I cannot report any discernible change. The range of intensity still remains:  sometimes very minimal (barely noticeable); sometimes mild (annoying);  sometimes more intense (uncomfortable). It remains present 24/7. My right arm from the shoulder down feels most of the effects, with the tingling, pins-and-needles sensation felt mostly between my shoulder and elbow, centering closer to the elbow, and my hand feeling very slightly weakened. None of this interferes with daily tasks  and I have no problems with any range of motion or strength that I need. For now, it remains a constant varying scale of discomfort issue only.

## 2019-07-17 ENCOUNTER — TELEPHONE (OUTPATIENT)
Dept: INTERNAL MEDICINE CLINIC | Age: 69
End: 2019-07-17

## 2019-10-31 DIAGNOSIS — E78.00 PURE HYPERCHOLESTEROLEMIA: Primary | ICD-10-CM

## 2019-10-31 DIAGNOSIS — Z00.00 MEDICARE ANNUAL WELLNESS VISIT, SUBSEQUENT: ICD-10-CM

## 2019-11-01 ENCOUNTER — HOSPITAL ENCOUNTER (OUTPATIENT)
Dept: LAB | Age: 69
Discharge: HOME | End: 2019-11-01
Payer: MEDICARE

## 2019-11-01 PROCEDURE — 80053 COMPREHEN METABOLIC PANEL: CPT

## 2019-11-01 PROCEDURE — 85025 COMPLETE CBC W/AUTO DIFF WBC: CPT

## 2019-11-01 PROCEDURE — 36415 COLL VENOUS BLD VENIPUNCTURE: CPT

## 2019-11-01 PROCEDURE — 80061 LIPID PANEL: CPT

## 2019-11-02 LAB
ALBUMIN SERPL-MCNC: 4.5 G/DL (ref 3.6–4.8)
ALBUMIN/GLOB SERPL: 2.3 {RATIO} (ref 1.2–2.2)
ALP SERPL-CCNC: 82 IU/L (ref 39–117)
ALT SERPL-CCNC: 21 IU/L (ref 0–32)
AST SERPL-CCNC: 25 IU/L (ref 0–40)
BASOPHILS # BLD AUTO: 0.1 X10E3/UL (ref 0–0.2)
BASOPHILS NFR BLD AUTO: 1 %
BILIRUB SERPL-MCNC: 0.5 MG/DL (ref 0–1.2)
BUN SERPL-MCNC: 17 MG/DL (ref 8–27)
BUN/CREAT SERPL: 19 (ref 12–28)
CALCIUM SERPL-MCNC: 9.6 MG/DL (ref 8.7–10.3)
CHLORIDE SERPL-SCNC: 102 MMOL/L (ref 96–106)
CHOLEST SERPL-MCNC: 242 MG/DL (ref 100–199)
CO2 SERPL-SCNC: 23 MMOL/L (ref 20–29)
CREAT SERPL-MCNC: 0.88 MG/DL (ref 0.57–1)
EOSINOPHIL # BLD AUTO: 0.1 X10E3/UL (ref 0–0.4)
EOSINOPHIL NFR BLD AUTO: 1 %
ERYTHROCYTE [DISTWIDTH] IN BLOOD BY AUTOMATED COUNT: 12.1 % (ref 12.3–15.4)
GLOBULIN SER CALC-MCNC: 2 G/DL (ref 1.5–4.5)
GLUCOSE SERPL-MCNC: 82 MG/DL (ref 65–99)
HCT VFR BLD AUTO: 42.7 % (ref 34–46.6)
HDLC SERPL-MCNC: 72 MG/DL
HGB BLD-MCNC: 14.3 G/DL (ref 11.1–15.9)
IMM GRANULOCYTES # BLD AUTO: 0 X10E3/UL (ref 0–0.1)
IMM GRANULOCYTES NFR BLD AUTO: 0 %
LDLC SERPL CALC-MCNC: 147 MG/DL (ref 0–99)
LYMPHOCYTES # BLD AUTO: 1.7 X10E3/UL (ref 0.7–3.1)
LYMPHOCYTES NFR BLD AUTO: 33 %
MCH RBC QN AUTO: 31.2 PG (ref 26.6–33)
MCHC RBC AUTO-ENTMCNC: 33.5 G/DL (ref 31.5–35.7)
MCV RBC AUTO: 93 FL (ref 79–97)
MONOCYTES # BLD AUTO: 0.6 X10E3/UL (ref 0.1–0.9)
MONOCYTES NFR BLD AUTO: 11 %
NEUTROPHILS # BLD AUTO: 2.7 X10E3/UL (ref 1.4–7)
NEUTROPHILS NFR BLD AUTO: 54 %
PLATELET # BLD AUTO: 338 X10E3/UL (ref 150–450)
POTASSIUM SERPL-SCNC: 4 MMOL/L (ref 3.5–5.2)
PROT SERPL-MCNC: 6.5 G/DL (ref 6–8.5)
RBC # BLD AUTO: 4.58 X10E6/UL (ref 3.77–5.28)
SODIUM SERPL-SCNC: 143 MMOL/L (ref 134–144)
TRIGL SERPL-MCNC: 114 MG/DL (ref 0–149)
VLDLC SERPL CALC-MCNC: 23 MG/DL (ref 5–40)
WBC # BLD AUTO: 5.1 X10E3/UL (ref 3.4–10.8)

## 2019-11-07 ENCOUNTER — OFFICE VISIT (OUTPATIENT)
Dept: INTERNAL MEDICINE CLINIC | Age: 69
End: 2019-11-07

## 2019-11-07 VITALS
OXYGEN SATURATION: 99 % | DIASTOLIC BLOOD PRESSURE: 68 MMHG | TEMPERATURE: 97.3 F | HEART RATE: 70 BPM | RESPIRATION RATE: 16 BRPM | HEIGHT: 61 IN | SYSTOLIC BLOOD PRESSURE: 115 MMHG | BODY MASS INDEX: 20.2 KG/M2 | WEIGHT: 107 LBS

## 2019-11-07 DIAGNOSIS — Z85.3 HISTORY OF RIGHT BREAST CANCER: ICD-10-CM

## 2019-11-07 DIAGNOSIS — M81.0 OSTEOPOROSIS, UNSPECIFIED OSTEOPOROSIS TYPE, UNSPECIFIED PATHOLOGICAL FRACTURE PRESENCE: ICD-10-CM

## 2019-11-07 DIAGNOSIS — E78.00 PURE HYPERCHOLESTEROLEMIA: ICD-10-CM

## 2019-11-07 DIAGNOSIS — Z13.31 SCREENING FOR DEPRESSION: ICD-10-CM

## 2019-11-07 DIAGNOSIS — Z13.39 SCREENING FOR ALCOHOLISM: ICD-10-CM

## 2019-11-07 DIAGNOSIS — Z00.00 MEDICARE ANNUAL WELLNESS VISIT, SUBSEQUENT: Primary | ICD-10-CM

## 2019-11-07 NOTE — PROGRESS NOTES
This is the Subsequent Medicare Annual Wellness Exam, performed 12 months or more after the Initial AWV or the last Subsequent AWV    I have reviewed the patient's medical history in detail and updated the computerized patient record. History     Patient Active Problem List   Diagnosis Code    Hyperlipidemia E78.5    Osteoporosis M81.0    S/P lumpectomy, right breast Z98.890    Allergic cough R05    Vaginal dryness, menopausal N95.1    History of right breast cancer Z85.3     Past Medical History:   Diagnosis Date    Avascular necrosis (HCC) age 45     right hip > replaced    AVN (avascular necrosis of bone) ([facility] age 56       (1997)    left hip > replaced    Breast cancer ([facility] 5/21/2013    Ectopic pregnancy age 44     (26)    Osteoarthritis of finger(s) age 53       (36)    Osteoporosis age 51   (56)   Grover Other and unspecified hyperlipidemia 7/6/2010    Proctitis     Sarcoidosis age 30   (200)    entirely resolved 1986      Past Surgical History:   Procedure Laterality Date    BONE GRAFT FEMUR HEAD/NECK/RIDGE  age 56    (5)    Left Hip    BREAST SURGERY PROCEDURE UNLISTED      BREST BIOPIES X 4 WITH 1 MALIGNANT.     ENDOSCOPY, COLON, DIAGNOSTIC  age 55   (2006    told to repeat in 5 years Dr. Aiyana Turner LUMPECTOMY  5/22/2013    BREAST LUMPECTOMY WITH AXILLARY NODE DISSECTION performed by Andrea Whitaker MD at [address] [place] [address] Drive  age 24      (26)    HX FRACTURE TX  age 51    (56)    5th metatarsal left foot    HX HEENT  2012    LASER SURGERY FOR \"HOLES IN RT & TETACHED RETINA IN LT\"    HX HEENT      EXTRACTION OF WISDOM TEETH    HX HEENT  8/2015    vitrectomy    HX HYSTERECTOMY  age 40       (46)   [de-identified] OOPHORECTOMY  age 41       (65)    RIGHT ONLY    HX ORTHOPAEDIC  2012    LEFT HIP REPLACEMENT    HX ORTHOPAEDIC  1997    LEFT BONE GRAFT DONE    HX ORTHOPAEDIC  1998    RIGHT HIP REVISION    HX ORTHOPAEDIC  2012    RIGHT HIP REVISION    HX REFRACTIVE SURGERY      HX TUBAL LIGATION  age 44      (26)    INCISE FINGER TENDON SHEATH   age 47    (2002)    Trigger finger    PELVIS/HIP JOINT SURGERY UNLISTED  age 45     (36)    RIGHT HIP REPLACEMENT    DE REVISE TOTAL HIP REPLACEMENT  age 53     (56)    right hip    DE REVISE TOTAL HIP REPLACEMENT  age 65      (2012)    left hip    TREAT ECTOPIC PREG,NON REMVAL  age 44  (26)     Current Outpatient Medications   Medication Sig Dispense Refill    multivitamin (ONE A DAY) tablet Take 1 Tab by mouth daily.  therapeutic multivitamin (THERAGRAN) tablet Take 1 Tab by mouth daily.  aspirin delayed-release 81 mg tablet Take 1 Tab by mouth daily. 30 Tab 1    calcium-vitamin D (CALCIUM 500+D) 500 mg(1,250mg) -200 unit per tablet Take 2 Tabs by mouth daily.       LORazepam (ATIVAN) 1 mg tablet Take 1 tab by mouth 30 minutes prior to MRI 1 Tab 0     Allergies   Allergen Reactions    Bactrim [Sulfamethoprim Ds] Other (comments)     Skin reaction    Ciprofloxacin Rash and Hives    Relafen [Nabumetone] Swelling     Facial Swelling       Family History   Problem Relation Age of Onset    Breast Cancer Mother 67        in situ    Cancer Mother 67        breast ca and esophageal    Heart defect Mother         murmur diag in 19's, never treated   Tamiko Mccauley Arthritis-osteo Mother         late 52's early 63's    Osteoporosis Mother 76    Other Mother 78        lupus - declared disease free in 2004    Arrhythmia Mother 80        pacemaker    Hypertension Father     Heart defect Father         murmur idagnosed in 19's    Kidney Disease Father 80        renal stent    High Cholesterol Father     Heart Disease Father         aortic stenosis    Osteoporosis Brother 47    Arthritis-osteo Brother 47    Other Brother         age 34 hypoglycemia & age 47 Raynaud    Heart Disease Maternal Grandfather         developed in 42's    Hypertension Paternal Grandfather     Kidney Disease Paternal Grandfather     Other Maternal Grandmother 71        amyotrophic lateral sclerosis (ALS)/ALS    Hypertension Brother 47    Other Brother 47        GERD    High Cholesterol Brother 47    Other Brother 62        Multilevel DDD    Other Brother 62        Spondylolithesis    Other Brother 62        Servin's Esophagus, Diverticulosis, Gastritis    Other Brother 62        Bladder neck obstruction, swollen adrenal glands, small vessel disease    Cancer Brother 63        Melanoma and Squamous Cell Carcinoma     Social History     Tobacco Use    Smoking status: Former Smoker     Packs/day: 0.25     Years: 6.00     Pack years: 1.50     Last attempt to quit: 1980     Years since quittin.8    Smokeless tobacco: Never Used   Substance Use Topics    Alcohol use: Yes     Alcohol/week: 0.8 standard drinks     Types: 1 Glasses of wine per week       Depression Risk Factor Screening:     3 most recent PHQ Screens 2019   Little interest or pleasure in doing things Not at all   Feeling down, depressed, irritable, or hopeless Not at all   Total Score PHQ 2 0       Alcohol Risk Factor Screening:   Do you average 1 drink per night or more than 7 drinks a week:  No    On any one occasion in the past three months have you have had more than 3 drinks containing alcohol:  No      Functional Ability and Level of Safety:   Hearing: Hearing is good. Activities of Daily Living: The home contains: no safety equipment. Patient does total self care    Ambulation: with no difficulty    Fall Risk:  Fall Risk Assessment, last 12 mths 2019   Able to walk? Yes   Fall in past 12 months?  No       Abuse Screen:  Patient is not abused    Cognitive Screening   Has your family/caregiver stated any concerns about your memory: no  Cognitive Screening: normal    Patient Care Team   Patient Care Team:  Arsen Jean Baptiste MD as PCP - [facility] Flavia Silver MD as PCP - [facility]  Mark Sanchez MD (Urology)  Russ Ortega MD (Urology)    Assessment/Plan   Education and counseling provided:  Are appropriate based on today's review and evaluation        There are no preventive care reminders to display for this patient. HPI:  Sebastian Velez is also here today follow-up for medical concerns:    1. Hypercholesterolemia:   Diet and Lifestyle: generally follows a low fat low cholesterol diet, generally follows a low sodium diet. Exercise: resistance and cardio. 2.  Hx of breast cancer: she is followed by Dustin Marte and states did have mammogram last spring. Had Bx breast in April which was benign per pt. we will request records. Has upcoming follow up right mammogram next week, followed by Dr Chapito Meza oncology. 3.  Osteoporosis: Se[facility] Dr Kimberlee [facility] at [facility] and last reclast in . Calcium and diet and supplement  Current Outpatient Medications   Medication Sig Dispense Refill    multivitamin (ONE A DAY) tablet Take 1 Tab by mouth daily.  therapeutic multivitamin (THERAGRAN) tablet Take 1 Tab by mouth daily.  aspirin delayed-release 81 mg tablet Take 1 Tab by mouth daily. 30 Tab 1    calcium-vitamin D (CALCIUM 500+D) 500 mg(1,250mg) -200 unit per tablet Take 2 Tabs by mouth daily.  LORazepam (ATIVAN) 1 mg tablet Take 1 tab by mouth 30 minutes prior to MRI 1 Tab 0     Allergies   Allergen Reactions    Bactrim [Sulfamethoprim Ds] Other (comments)     Skin reaction    Ciprofloxacin Rash and Hives    Relafen [Nabumetone] Swelling     Facial Swelling     Social History     Tobacco Use    Smoking status: Former Smoker     Packs/day: 0.25     Years: 6.00     Pack years: 1.50     Last attempt to quit: 1980     Years since quittin.8    Smokeless tobacco: Never Used   Substance Use Topics    Alcohol use: Yes     Alcohol/week: 0.8 standard drinks     Types: 1 Glasses of wine per week         Review of Systems   Constitutional: Negative for chills, fever and malaise/fatigue.    HENT: Negative for congestion and sore throat. Eyes: Negative for blurred vision and double vision. Respiratory: Negative for cough, shortness of breath and wheezing. Cardiovascular: Negative for chest pain, palpitations and leg swelling. Gastrointestinal: Negative for abdominal pain, blood in stool, constipation, diarrhea, heartburn, nausea and vomiting. Genitourinary: Negative for dysuria, frequency and urgency. Musculoskeletal: Negative for myalgias. Skin: Negative for rash. Neurological: Negative for dizziness, sensory change, focal weakness and headaches. Psychiatric/Behavioral: Negative for depression. The patient is not nervous/anxious. Physical Exam   Constitutional: She appears well-developed. No distress. HENT:   Head: Normocephalic and atraumatic. Right Ear: Tympanic membrane and ear canal normal.   Left Ear: Tympanic membrane and ear canal normal.   Nose: Nose normal.   Mouth/Throat: Oropharynx is clear and moist.   Eyes: Pupils are equal, round, and reactive to light. Conjunctivae and EOM are normal.   Neck: Normal range of motion. No thyromegaly present. Cardiovascular: Normal rate, regular rhythm, normal heart sounds and intact distal pulses. No murmur heard. Pulmonary/Chest: Effort normal and breath sounds normal.   Breast exam: breasts appear normal, no suspicious masses, no skin or nipple changes or axillary nodes. Abdominal: Soft. Bowel sounds are normal. She exhibits no mass. There is no tenderness. Musculoskeletal: She exhibits no edema. Lymphadenopathy:     She has no cervical adenopathy. Neurological: She has normal strength. No cranial nerve deficit or sensory deficit. Skin: No rash noted. Psychiatric: She has a normal mood and affect. Nursing note and vitals reviewed.     Visit Vitals  /68   Pulse 70   Temp 97.3 °F (36.3 °C) (Oral)   Resp 16   Ht 5' 1\" (1.549 m)   Wt 107 lb (48.5 kg)   SpO2 99%   BMI 20.22 kg/m²       Assessment & Plan:    ICD-10-CM ICD-9-CM    1. Medicare annual wellness visit, subsequent  Health maintenance reviewed and updated with patient today at visit. Z00.00 V70.0    2. Screening for alcoholism Z13.39 V79.1 SD ANNUAL ALCOHOL SCREEN 15 MIN   3. Screening for depression Z13.31 V79.0 DEPRESSION SCREEN ANNUAL   4. Pure hypercholesterolemia  Reviewed cholesterol today and she will continue with lifestyle management. Has not wanted to be on a statin and her 10-year cardiovascular risk is not at a level that I would recommend statin medication. We did discuss coronary calcium scan to further risk stratify her but at this time she declines this. E78.00 272.0    5. Osteoporosis, unspecified osteoporosis type, unspecified pathological fracture presence  Continue with management as per her endocrinologist and just had Reclast.  Calcium and vitamin D and diet and supplement as needed. M81.0 733.00    6. History of right breast cancer  No evidence of recurrence and will request most recent mammogram and biopsy report. Continue to follow with oncology. Z85.3 V10.3       Orders Placed This Encounter    Depression Screen Annual    Annual  Alcohol Screen 15 min ()         Advised her to call back or return to office if symptoms worsen/change/persist.  Discussed expected course/resolution/complications of diagnosis in detail with patient. Medication risks/benefits/costs/interactions/alternatives discussed with patient. She was given an after visit summary which includes diagnoses, current medications, & vitals. She expressed understanding with the diagnosis and plan.

## 2019-11-07 NOTE — PATIENT INSTRUCTIONS
Medicare Wellness Visit, Female     The best way to live healthy is to have a lifestyle where you eat a well-balanced diet, exercise regularly, limit alcohol use, and quit all forms of tobacco/nicotine, if applicable. Regular preventive services are another way to keep healthy. Preventive services (vaccines, screening tests, monitoring & exams) can help personalize your care plan, which helps you manage your own care. Screening tests can find health problems at the earliest stages, when they are easiest to treat. [facility] follows the current, evidence-based guidelines published by the [facility] ([facility]) when recommending preventive services for our patients. Because we follow these guidelines, sometimes recommendations change over time as research supports it. (For example, mammograms used to be recommended annually. Even though Medicare will still pay for an annual mammogram, the newer guidelines recommend a mammogram every two years for women of average risk). Of course, you and your doctor may decide to screen more often for some diseases, based on your risk and your co-morbidities (chronic disease you are already diagnosed with). Preventive services for you include:  - Medicare offers their members a free annual wellness visit, which is time for you and your primary care provider to discuss and plan for your preventive service needs. Take advantage of this benefit every year!  -All adults over the age of 72 should receive the recommended pneumonia vaccines. Current USPSTF guidelines recommend a series of two vaccines for the best pneumonia protection.   -All adults should have a flu vaccine yearly and a tetanus vaccine every 10 years.   -All adults age 48 and older should receive the shingles vaccines (series of two vaccines).       -All adults age 38-68 who are overweight should have a diabetes screening test once every three years.   -All adults born between 80 and 1965 should be screened once for Hepatitis C.  -Other screening tests and preventive services for persons with diabetes include: an eye exam to screen for diabetic retinopathy, a kidney function test, a foot exam, and stricter control over your cholesterol.   -Cardiovascular screening for adults with routine risk involves an electrocardiogram (ECG) at intervals determined by your doctor.   -Colorectal cancer screenings should be done for adults age 54-65 with no increased risk factors for colorectal cancer. There are a number of acceptable methods of screening for this type of cancer. Each test has its own benefits and drawbacks. Discuss with your doctor what is most appropriate for you during your annual wellness visit. The different tests include: colonoscopy (considered the best screening method), a fecal occult blood test, a fecal DNA test, and sigmoidoscopy.    -A bone mass density test is recommended when a woman turns 65 to screen for osteoporosis. This test is only recommended one time, as a screening. Some providers will use this same test as a disease monitoring tool if you already have osteoporosis. -Breast cancer screenings are recommended every other year for women of normal risk, age 54-69.  -Cervical cancer screenings for women over age 72 are only recommended with certain risk factors. Here is a list of your current Health Maintenance items (your personalized list of preventive services) with a due date: There are no preventive care reminders to display for this patient.

## 2019-11-07 NOTE — PROGRESS NOTES
Identified pt with two pt identifiers(name and ). Reviewed record in preparation for visit and have obtained necessary documentation. Chief Complaint   Patient presents with   [facility] Annual Wellness Visit         Other     pt requesting breast exam        Health Maintenance Due   Topic    MEDICARE YEARLY EXAM        Coordination of Care Questionnaire:  :   1) Have you been to an emergency room, urgent care, or hospitalized since your last visit? If yes, where when, and reason for visit? no       2. Have seen or consulted any other health care provider since your last visit? If yes, where when, and reason for visit? YES  - mammogram  - spine specialist    3) Do you have an Advanced Directive/ Living Will in place? YES  If yes, do we have a copy on file YES  If no, would you like information NO    Patient is accompanied by self I have received verbal consent from Edgar Wolff to discuss any/all medical information while they are present in the room.

## 2019-11-13 ENCOUNTER — OFFICE VISIT (OUTPATIENT)
Dept: ONCOLOGY | Age: 69
End: 2019-11-13

## 2019-11-13 VITALS
SYSTOLIC BLOOD PRESSURE: 112 MMHG | HEART RATE: 77 BPM | TEMPERATURE: 97.4 F | DIASTOLIC BLOOD PRESSURE: 72 MMHG | HEIGHT: 61 IN | BODY MASS INDEX: 20.3 KG/M2 | WEIGHT: 107.5 LBS | OXYGEN SATURATION: 98 %

## 2019-11-13 DIAGNOSIS — Z85.3 HISTORY OF BREAST CANCER: Primary | ICD-10-CM

## 2019-11-13 DIAGNOSIS — M81.0 OSTEOPOROSIS, UNSPECIFIED OSTEOPOROSIS TYPE, UNSPECIFIED PATHOLOGICAL FRACTURE PRESENCE: ICD-10-CM

## 2019-11-13 DIAGNOSIS — Z98.890 HISTORY OF LUMPECTOMY OF RIGHT BREAST: ICD-10-CM

## 2019-11-13 NOTE — LETTER
11/13/19 Patient: Vaughn Tom YOB: 1950 Date of Visit: 11/13/2019 Fitz Logan MD 
[address] [place] [address] [address] [facility]vä[phone] VIA In Basket Dear Fitz Logan MD, Thank you for referring Ms. Roselyn Ortiz to [address] [place] [address] for evaluation. My notes for this consultation are attached. If you have questions, please do not hesitate to call me. I look forward to following your patient along with you. Sincerely, Nimco Burr, DO

## 2019-11-13 NOTE — PROGRESS NOTES
HEME/ONC PROGRESS NOTE       Dudley Garcia is a 77 y.o. 1950 female and presents with Follow-up (Breast Cancer)    CC  Breast cancer dx 5/13    HPI: Patient is here for over 1 year fu breast cancer not on any therapy. Last seen here 5/18 per pt preference. mammo 11/19 from [facility]. Had biopsy in past that was negative per pt.   breast MRI 5/19 also with [facility]. Pt has list of questions today. Doing well overall. Otherwise, complete ROS is per the symptom report form which has been scanned into the media section of the electronic medical record. DX   Breast Cancer     STAGE: T1bN0  ER+ her2 neg  oncotype low risk    TREATMENT COURSE: lumpectomy right 5/22/13,  XRT,  Arimidex to stop after 5 years due to low risk BCI. Jericho Benson Past Medical History:   Diagnosis Date    Avascular necrosis [facility]) age 45     right hip > replaced    AVN (avascular necrosis of bone) ([facility]) age 56       (1997)    left hip > replaced    Breast cancer ([facility] [address] 5/21/2013    Ectopic pregnancy age 44     (26)    Osteoarthritis of finger(s) age 53       (36)    Osteoporosis age 51   (56)   Meri Linus Other and unspecified hyperlipidemia 7/6/2010    Proctitis     Sarcoidosis age 30   (200)    entirely resolved 1986     Past Surgical History:   Procedure Laterality Date    BONE GRAFT FEMUR HEAD/NECK/RIDGE  age 56    (5)    Left Hip    BREAST SURGERY PROCEDURE UNLISTED      BREST BIOPIES X 4 WITH 1 MALIGNANT.     ENDOSCOPY, COLON, DIAGNOSTIC  age 55   (2006    told to repeat in 5 years Dr. Juan M Pulido LUMPECTOMY  5/22/2013    BREAST LUMPECTOMY WITH AXILLARY NODE DISSECTION performed by Emerson Holbrook MD at [address]  age 24      (26)    HX FRACTURE TX  age 51    (56)    5th metatarsal left foot    HX HEENT  2012    LASER SURGERY FOR \"HOLES IN RT & TETACHED RETINA IN LT\"    HX HEENT      EXTRACTION OF WISDOM TEETH    HX HEENT  8/2015    vitrectomy    HX HYSTERECTOMY  age 40       (46)   Carren Bowen OOPHORECTOMY  age 41       (65)    RIGHT ONLY    HX ORTHOPAEDIC      LEFT HIP REPLACEMENT    HX ORTHOPAEDIC      LEFT BONE GRAFT DONE    HX ORTHOPAEDIC      RIGHT HIP REVISION    HX ORTHOPAEDIC      RIGHT HIP REVISION    HX REFRACTIVE SURGERY      HX TUBAL LIGATION  age 44      (26)    INCISE FINGER TENDON SHEATH   age 47    (65)    Trigger finger    PELVIS/HIP JOINT SURGERY UNLISTED  age 45     (36)    RIGHT HIP REPLACEMENT    MO REVISE TOTAL HIP REPLACEMENT  age 53     (56)    right hip    MO REVISE TOTAL HIP REPLACEMENT  age 65      (2012)    left hip    TREAT ECTOPIC PREG,NON REMVAL  age 44  (26)     Social History     Socioeconomic History    Marital status:      Spouse name: Not on file    Number of children: Not on file    Years of education: Not on file    Highest education level: Not on file   Tobacco Use    Smoking status: Former Smoker     Packs/day: 0.25     Years: 6.00     Pack years: 1.50     Last attempt to quit: 1980     Years since quittin.8    Smokeless tobacco: Never Used   Substance and Sexual Activity    Alcohol use:  Yes     Alcohol/week: 0.8 standard drinks     Types: 1 Glasses of wine per week    Drug use: No    Sexual activity: Yes     Partners: Male     Birth control/protection: None     Family History   Problem Relation Age of Onset    Breast Cancer Mother 67        in situ    Cancer Mother 67        breast ca and esophageal    Heart defect Mother         murmur diag in 19's, never treated    Arthritis-osteo Mother         late 52's early 63's    Osteoporosis Mother 76    Other Mother 78        lupus - declared disease free in     Arrhythmia Mother 80        pacemaker    Hypertension Father     Heart defect Father         murmur idagnosed in 19's    Kidney Disease Father 80        renal stent    High Cholesterol Father     Heart Disease Father         aortic stenosis    Osteoporosis Brother 47    Arthritis-osteo Brother 47    Other Brother         age 34 hypoglycemia & age 47 Raynaud    Heart Disease Maternal Grandfather         developed in 42's    Hypertension Paternal Grandfather     Kidney Disease Paternal Grandfather     Other Maternal Grandmother 71        amyotrophic lateral sclerosis (ALS)/ALS    Hypertension Brother 47    Other Brother 47        GERD    High Cholesterol Brother 47    Other Brother 62        Multilevel DDD    Other Brother 62        Spondylolithesis    Other Brother 62        Servin's Esophagus, Diverticulosis, Gastritis    Other Brother 62        Bladder neck obstruction, swollen adrenal glands, small vessel disease    Cancer Brother 63        Melanoma and Squamous Cell Carcinoma       Current Outpatient Medications   Medication Sig Dispense Refill    multivitamin (ONE A DAY) tablet Take 1 Tab by mouth daily.  LORazepam (ATIVAN) 1 mg tablet Take 1 tab by mouth 30 minutes prior to MRI 1 Tab 0    therapeutic multivitamin (THERAGRAN) tablet Take 1 Tab by mouth daily.  aspirin delayed-release 81 mg tablet Take 1 Tab by mouth daily. 30 Tab 1    calcium-vitamin D (CALCIUM 500+D) 500 mg(1,250mg) -200 unit per tablet Take 2 Tabs by mouth daily. Allergies   Allergen Reactions    Bactrim [Sulfamethoprim Ds] Other (comments)     Skin reaction    Ciprofloxacin Rash and Hives    Relafen [Nabumetone] Swelling     Facial Swelling       Review of Systems    A comprehensive review of systems was negative except for:  Per HPI.       Objective:  Visit Vitals  /72 (BP 1 Location: Left arm, BP Patient Position: Sitting)   Pulse 77   Temp 97.4 °F (36.3 °C) (Oral)   Ht 5' 1\" (1.549 m)   Wt 107 lb 8 oz (48.8 kg)   SpO2 98%   BMI 20.31 kg/m²         Physical Exam:   General appearance - alert, well appearing, and in no distress, oriented to person, place, and time and normal appearing weight  Mental status - alert, oriented to person, place, and time, normal mood, behavior, speech, dress, motor activity, and thought processes  EYE-conj clear  Mouth - mucous membranes moist  Neck - supple  CV regular  resp clear  GI soft NT  Ext-no pedal edema noted  Skin-Warm and dry. No rashes/lesions noted. Neuro -alert, oriented, normal speech, no focal findings or movement disorder noted  Breast -no masses palpable bilaterally    Diagnostic Imaging   reviewed  Results for orders placed during the hospital encounter of 06/26/19   XR SPINE CERV 4 OR 5 V    Narrative Cervical spine 5 views dated 6/26/2019    History is cervical radiculopathy    AP, lateral and oblique views of the cervical spine were obtained. The cervical  vertebrae are of normal height. There is evidence of cervical spondylosis. The  intervertebral disc spaces are well-maintained. There is evidence of neural  foraminal encroachment at the C5-6 level and, to a lesser degree, the C6-7  level. Impression IMPRESSION: Evidence of cervical spondylosis as described above. Results for orders placed during the hospital encounter of 09/07/17   CT HEAD WO CONT    Narrative EXAM:  CT HEAD WO CONT    INDICATION:   Bilateral vision changes for one week    COMPARISON: None. CONTRAST:  None. TECHNIQUE: Unenhanced CT of the head was performed using 5 mm images. Brain and  bone windows were generated. CT dose reduction was achieved through use of a  standardized protocol tailored for this examination and automatic exposure  control for dose modulation. FINDINGS:  The ventricles and sulci are normal in size, shape and configuration and  midline. There is no significant white matter disease. There is no intracranial  hemorrhage, extra-axial collection, mass, mass effect or midline shift. The  basilar cisterns are open. No acute infarct is identified. The bone windows  demonstrate no abnormalities. The visualized portions of the paranasal sinuses  and mastoid air cells are clear.  Vascular calcification is noted. Impression IMPRESSION: No acute abnormality. Lab Results  Reviewed  Per PCP    Lab Results   Component Value Date/Time    WBC 5.1 11/01/2019 08:45 AM    HGB 14.3 11/01/2019 08:45 AM    HCT 42.7 11/01/2019 08:45 AM    PLATELET 526 43/45/7228 08:45 AM    MCV 93 11/01/2019 08:45 AM       Lab Results   Component Value Date/Time    Sodium 143 11/01/2019 08:45 AM    Potassium 4.0 11/01/2019 08:45 AM    Chloride 102 11/01/2019 08:45 AM    CO2 23 11/01/2019 08:45 AM    Anion gap 8 09/07/2017 09:59 AM    Glucose 82 11/01/2019 08:45 AM    BUN 17 11/01/2019 08:45 AM    Creatinine 0.88 11/01/2019 08:45 AM    BUN/Creatinine ratio 19 11/01/2019 08:45 AM    GFR est AA 78 11/01/2019 08:45 AM    GFR est non-AA 68 11/01/2019 08:45 AM    Calcium 9.6 11/01/2019 08:45 AM    AST (SGOT) 25 11/01/2019 08:45 AM    Alk. phosphatase 82 11/01/2019 08:45 AM    Protein, total 6.5 11/01/2019 08:45 AM    Albumin 4.5 11/01/2019 08:45 AM    Globulin 3.5 09/07/2017 09:59 AM    A-G Ratio 2.3 (H) 11/01/2019 08:45 AM    ALT (SGPT) 21 11/01/2019 08:45 AM       Assessment/Plan:     Ramona Anderson is a 77 y.o. female and presents with Breast Cancer    CC  Breast cancer    HPI: Patient is here for breast cancer f/u    STAGE: T1bN0  ER+ her2 neg  oncotype low risk. TREATMENT COURSE: lumpectomy 5/22/13  XRT. Arimadex. 1.  Stage 1 ER+ Her2 neg Breast cancer hx lump 2013 / XRT. She has had no evidence of recurrent disease. Did adjuvant Arimidex for 5 years and BCI low risk so stopped AI. Pt has chronic breast tenderness. Mammogram good 11/19 per [facility]   Breast MRI done 5/19 at [facility] good. labs with PCP good 11/19.     2.  Osteoporosis  Per endocrine. Remains on prolia. 3.  Psychosocial. Mood good. Coping well. Concerned about recurrence. Call if questions. Will follow-up in 12 months, sooner if needed. ICD-10-CM ICD-9-CM    1. History of breast cancer Z85.3 V10.3    2.  History of lumpectomy of right breast Z98.890 V45.89    3. Osteoporosis, unspecified osteoporosis type, unspecified pathological fracture presence M81.0 733.00      No orders of the defined types were placed in this encounter. continue present plan, call if any problems  There are no Patient Instructions on file for this visit. Follow-up and Dispositions    · Return in about 1 year (around 11/13/2020).          Ryan Goodman, DO

## 2019-11-13 NOTE — PROGRESS NOTES
Edgar Wolff is a 77 y.o. female  Chief Complaint   Patient presents with    Follow-up     Breast Cancer     1. Have you been to the ER, urgent care clinic since your last visit? Hospitalized since your last visit? No.  2. Have you seen or consulted any other health care providers outside of the [address] [place] [address] since your last visit? Include any pap smears or colon screening.  Yes, Aubrey Hanna (Orthopedic) and  (Radiologist)

## 2020-01-29 ENCOUNTER — TELEPHONE (OUTPATIENT)
Dept: INTERNAL MEDICINE CLINIC | Age: 70
End: 2020-01-29

## 2020-01-29 ENCOUNTER — OFFICE VISIT (OUTPATIENT)
Dept: INTERNAL MEDICINE CLINIC | Age: 70
End: 2020-01-29

## 2020-01-29 VITALS
SYSTOLIC BLOOD PRESSURE: 162 MMHG | HEART RATE: 68 BPM | DIASTOLIC BLOOD PRESSURE: 72 MMHG | OXYGEN SATURATION: 99 % | WEIGHT: 105 LBS | TEMPERATURE: 97.7 F | HEIGHT: 61 IN | BODY MASS INDEX: 19.83 KG/M2 | RESPIRATION RATE: 10 BRPM

## 2020-01-29 DIAGNOSIS — R42 DIZZY: Primary | ICD-10-CM

## 2020-01-29 RX ORDER — ONDANSETRON 4 MG/1
4 TABLET, ORALLY DISINTEGRATING ORAL
Qty: 6 TAB | Refills: 0 | Status: SHIPPED | OUTPATIENT
Start: 2020-01-29 | End: 2021-12-03

## 2020-01-29 NOTE — PROGRESS NOTES
HISTORY OF PRESENT ILLNESS  Burke Calvillo is a 77 y.o. female. HPI  Patient is a 76 yo woman who complains of a 3 day history of feeling nauseous and dizzy first thing in the AM. Symptoms occur when she first gets out of bed and improved by mid day, then recur the following day. She denies room spinning, but describes the episodes as lightheadness and nausea. She denies vomiting, abdominal pain, tinnitus, headache or change in vision. She denies chest pain, palpitations, syncope or pre syncope. She had a normal CBC, BMP on 10/31/19. She is not currently having symptoms. She admits to a past history of vertigo but has had symptoms while in bed, which improved with position change. She has continued going to the gym 3x/week without chest pain, palpitations or shortness of breath, and walks 5 miles on the other days, without difficulty. Past Medical History:   Diagnosis Date    Avascular necrosis [facility]) age 45     right hip > replaced    AVN (avascular necrosis of bone) ([facility]) age 56       (1997)    left hip > replaced    Breast cancer ([facility] [address] 5/21/2013    Ectopic pregnancy age 44     (26)    Osteoarthritis of finger(s) age 53       (36)    Osteoporosis age 51   (56)   Garmike White Other and unspecified hyperlipidemia 7/6/2010    Proctitis     Sarcoidosis age 30   (200)    entirely resolved 1986      ROS  Per HPI  Physical Exam  Visit Vitals  /72   Pulse 68   Temp 97.7 °F (36.5 °C) (Oral)   Resp 10   Ht 5' 1\" (1.549 m)   Wt 105 lb (47.6 kg)   SpO2 99%   BMI 19.84 kg/m²     Repeat BP left 160/78  Patient is in no apparent distress  HEENT: normocephalic, extraocular movements intact, without nystagmus, conjunctiva clear  Oropharynx: clear. No erythema, exudate, or drainage  Nose: no drainage  Sinuses: nontender  Ears: tympanic membrane's and canals normal.  No erythema, fluid, drainage  Neck: supple  Heart[de-identified] normal rate, regular rhythm, normal S1, S2, no murmurs, rubs, clicks or gallops.   Chest: clear to auscultation, no wheezes, rales or rhonchi,   Extremities: no edema  Neuro: non focal, no dizziness elicited with maneuvers        ASSESSMENT and PLAN  Viral illness vs miild vertigo   Zofran 4 mg as directed for nausea  Vertigo exercises provided  Increase fluids      Follow-up Disposition:  Return if symptoms worsen or fail to improve. Advised to call back or return to office if symptoms worsen/change/persist.  Discussed expected course/resolution/complications of diagnosis in detail with patient. Medication risks/benefits/costs/interactions/alternatives discussed with patient. She was given an after visit summary which includes diagnoses, current medications, & vitals. She expressed understanding with the diagnosis and plan.

## 2020-01-29 NOTE — PATIENT INSTRUCTIONS
Vertigo: Exercises  Introduction  Here are some examples of exercises for you to try. The exercises may be suggested for a condition or for rehabilitation. Start each exercise slowly. Ease off the exercises if you start to have pain. You will be told when to start these exercises and which ones will work best for you. How to do the exercises  Exercise 1    1. Stand with a chair in front of you and a wall behind you. If you begin to fall, you may use them for support. 2. Stand with your feet together and your arms at your sides. 3. Move your head up and down 10 times. Exercise 2    1. Move your head side to side 10 times. Exercise 3    1. Move your head diagonally up and down 10 times. Exercise 4    1. Move your head diagonally up and down 10 times on the other side. Follow-up care is a key part of your treatment and safety. Be sure to make and go to all appointments, and call your doctor if you are having problems. It's also a good idea to know your test results and keep a list of the medicines you take. Where can you learn more? Go to http://sharri-flakito.info/. Enter F349 in the search box to learn more about \"Vertigo: Exercises. \"  Current as of: October 21, 2018  Content Version: 12.2  © 5506-6055 [employer]. Care instructions adapted under license by [employer] (which disclaims liability or warranty for this information). If you have questions about a medical condition or this instruction, always ask your healthcare professional. [place] [address] any warranty or liability for your use of this information. Vertigo exercises  Increase your fluids  Zofran as directed  Check blood pressure readings and send MyChart message to Cherelle Cary MD   Call or return to clinic if these symptoms worsen or fail to improve as anticipated.

## 2020-05-22 DIAGNOSIS — F41.8 ANXIETY ABOUT HEALTH: ICD-10-CM

## 2020-05-22 DIAGNOSIS — F40.240 CLAUSTROPHOBIA: ICD-10-CM

## 2020-05-22 RX ORDER — LORAZEPAM 1 MG/1
TABLET ORAL
Qty: 1 TAB | Refills: 0 | Status: SHIPPED | OUTPATIENT
Start: 2020-05-22 | End: 2021-07-29 | Stop reason: SDUPTHER

## 2020-06-02 ENCOUNTER — TELEPHONE (OUTPATIENT)
Dept: INTERNAL MEDICINE CLINIC | Age: 70
End: 2020-06-02

## 2020-06-02 DIAGNOSIS — N60.92 ATYPICAL DUCTAL HYPERPLASIA OF LEFT BREAST: ICD-10-CM

## 2020-06-02 DIAGNOSIS — Z85.3 PERSONAL HISTORY OF BREAST CANCER: Primary | ICD-10-CM

## 2020-06-02 DIAGNOSIS — N60.91 ATYPICAL DUCTAL HYPERPLASIA OF RIGHT BREAST: ICD-10-CM

## 2020-06-02 NOTE — TELEPHONE ENCOUNTER
Received request from [employer] for breast MRI with and without contrast.   Please fax order.

## 2020-06-11 ENCOUNTER — TELEPHONE (OUTPATIENT)
Dept: INFUSION THERAPY | Age: 70
End: 2020-06-11

## 2020-06-11 NOTE — TELEPHONE ENCOUNTER
Returned patient call, Left VM to contact RN at MD office, contact information supplied. 1706 patient returned RN call, HIPAA verified. Patient extremely emotional and upset. When able to calm down, stated was late getting her annual mammogram d/t CoVid. Felt lump on operative breast, had mammogram yesterday at [facility]. Lump is 1/2\" in length. Radiologist recommends biopsy, cannot be performed until 7/23 d/t backlog of cases. Declined being on a wait list for cancellations. States \"talk me down from the ledge. \" Reminded patient that not all lumps in breasts were cancerous and that we would request records from [facility]. Became tearful again, \"I always request they send records automatically. \"  Assured patient that this was normal process. Requests that MD \"use her muscle to put the pedal to the metal.\"  Patient verbalized understanding that MD would review her records and make appropriate recommendations, but would really need all studies for complete opinion. Thanked RN for call.

## 2020-06-11 NOTE — TELEPHONE ENCOUNTER
Patient called and stated she had a biopsy  done and has some questions. She would like a callback.       [phone]

## 2020-06-12 ENCOUNTER — DOCUMENTATION ONLY (OUTPATIENT)
Dept: ONCOLOGY | Age: 70
End: 2020-06-12

## 2020-06-15 NOTE — TELEPHONE ENCOUNTER
Call to patient, HIPAA verified. Updated patient that report of mammogram has been received and scanned into her chart. Patient declines Virtual Visit at this point in time. States report was also sent to Dr. Medardo Dudley, Dr. Savanna Schaffer (rad onc), and to Ashly Oconnell NP. Continues to worry that her biopsy of 7/23 is too far away, but states she \"is not going to call the [facility] and ask for an earlier day. \" Patient plans to contact Dr. Medardo Dudley' office regarding biopsy. Stated that she would follow up with them and see if results receive and if MD there for thoughts on expediting biopsy was necessary. Thanked RN for call.

## 2020-10-12 ENCOUNTER — TELEPHONE (OUTPATIENT)
Dept: ONCOLOGY | Age: 70
End: 2020-10-12

## 2020-10-12 NOTE — TELEPHONE ENCOUNTER
Obed Alfaro, from [employer] called and wanted to verify if the Pt was a patient of Dr. Nell Najjar. I indicated that I was not allowed to disclose that information.     She would like a call back    [phone]

## 2020-10-13 NOTE — TELEPHONE ENCOUNTER
Spoke to Rep at [employer], advised per [employer] would need to check with Pt first to get permission. Rep said they do not need this information but thanked nurse for calling back.

## 2020-10-20 ENCOUNTER — TELEPHONE (OUTPATIENT)
Dept: ONCOLOGY | Age: 70
End: 2020-10-20

## 2020-10-20 NOTE — TELEPHONE ENCOUNTER
Call to patient, HIPAA verified. [facility] is looking to change Medicare plans to a [employer]. [facility] insurance company needs information from our office and that there have been multiple phone calls from . Patient informed that all release of information for her safety and protection would have to be written requests from  or insurance company. Understanding verbalized. Patient accepted MD office fax number, will relay information to marielle and requests callback if written request is received.

## 2020-10-22 ENCOUNTER — TELEPHONE (OUTPATIENT)
Dept: ONCOLOGY | Age: 70
End: 2020-10-22

## 2020-10-22 NOTE — TELEPHONE ENCOUNTER
Spoke to Pt, she is trying to get insurance and has questions about records. she would like to speak to [facility] since this is who she has been dealing with in the past.  Consulted with [facility], she will call Pt back.

## 2020-10-22 NOTE — TELEPHONE ENCOUNTER
Pt is requesting to have a phone call back as soon as possible did not specify on what it was about when asked  Best contact [phone]

## 2020-10-22 NOTE — TELEPHONE ENCOUNTER
Returned patient call, HIPAA verified. Verified for patient that fax from insurance company requesting patient information had not been received. Per patient, she and spouse will send test fax to office \"to verify that your fax is working. \"  Patient requests confirmation callback when fax received. Resupplied patient with office fax number. [phone] Fax received from patient's spouse, called patient, HIPAA verified, read back fax word for word for confirmation. She will follow up with her insurance company.

## 2020-11-02 ENCOUNTER — TELEPHONE (OUTPATIENT)
Dept: INTERNAL MEDICINE CLINIC | Age: 70
End: 2020-11-02

## 2020-11-03 ENCOUNTER — HOSPITAL ENCOUNTER (OUTPATIENT)
Dept: LAB | Age: 70
Discharge: HOME | End: 2020-11-03
Payer: MEDICARE

## 2020-11-03 PROCEDURE — 36415 COLL VENOUS BLD VENIPUNCTURE: CPT

## 2020-11-03 PROCEDURE — 80053 COMPREHEN METABOLIC PANEL: CPT

## 2020-11-03 NOTE — TELEPHONE ENCOUNTER
----- Message from Jerrica Flores, RN sent at 11/2/2020  8:06 AM EST -----  Regarding: FW: Non-Urgent Medical Question  Contact: [phone]    ----- Message -----  From: Ross Weiner  Sent: 10/30/2020   2:06 PM EST  To: [facility]  Subject: Non-Urgent Medical Question                      Hello. In advance of my 11/12 annual physical I would like to get my labs done on 11/5. Would you have the slip ready for me upfront by the 5th? Thanks.

## 2020-11-04 LAB
ALBUMIN SERPL-MCNC: 4.5 G/DL (ref 3.8–4.8)
ALBUMIN/GLOB SERPL: 2.4 {RATIO} (ref 1.2–2.2)
ALP SERPL-CCNC: 86 IU/L (ref 39–117)
ALT SERPL-CCNC: 22 IU/L (ref 0–32)
AST SERPL-CCNC: 26 IU/L (ref 0–40)
BILIRUB SERPL-MCNC: 0.3 MG/DL (ref 0–1.2)
BUN SERPL-MCNC: 18 MG/DL (ref 8–27)
BUN/CREAT SERPL: 21 (ref 12–28)
CALCIUM SERPL-MCNC: 9.5 MG/DL (ref 8.7–10.3)
CHLORIDE SERPL-SCNC: 104 MMOL/L (ref 96–106)
CO2 SERPL-SCNC: 25 MMOL/L (ref 20–29)
CREAT SERPL-MCNC: 0.85 MG/DL (ref 0.57–1)
GLOBULIN SER CALC-MCNC: 1.9 G/DL (ref 1.5–4.5)
GLUCOSE SERPL-MCNC: 89 MG/DL (ref 65–99)
POTASSIUM SERPL-SCNC: 4.8 MMOL/L (ref 3.5–5.2)
PROT SERPL-MCNC: 6.4 G/DL (ref 6–8.5)
SODIUM SERPL-SCNC: 140 MMOL/L (ref 134–144)

## 2020-11-11 ENCOUNTER — TELEPHONE (OUTPATIENT)
Dept: INTERNAL MEDICINE CLINIC | Age: 70
End: 2020-11-11

## 2020-11-11 NOTE — TELEPHONE ENCOUNTER
Fahad Torrez (Self) [phone] (H)     Pt is requesting a call back regarding labs she had done on 11/3/20  She says that she has a cpe tomorrow and wonders why more labs were not ordered.

## 2020-11-12 ENCOUNTER — OFFICE VISIT (OUTPATIENT)
Dept: INTERNAL MEDICINE CLINIC | Age: 70
End: 2020-11-12
Payer: MEDICARE

## 2020-11-12 VITALS
WEIGHT: 106 LBS | BODY MASS INDEX: 20.01 KG/M2 | OXYGEN SATURATION: 98 % | DIASTOLIC BLOOD PRESSURE: 64 MMHG | SYSTOLIC BLOOD PRESSURE: 132 MMHG | HEART RATE: 69 BPM | HEIGHT: 61 IN | RESPIRATION RATE: 16 BRPM | TEMPERATURE: 97.5 F

## 2020-11-12 DIAGNOSIS — M81.0 OSTEOPOROSIS, UNSPECIFIED OSTEOPOROSIS TYPE, UNSPECIFIED PATHOLOGICAL FRACTURE PRESENCE: ICD-10-CM

## 2020-11-12 DIAGNOSIS — Z13.39 SCREENING FOR ALCOHOLISM: ICD-10-CM

## 2020-11-12 DIAGNOSIS — Z85.3 HISTORY OF RIGHT BREAST CANCER: ICD-10-CM

## 2020-11-12 DIAGNOSIS — Z00.00 MEDICARE ANNUAL WELLNESS VISIT, SUBSEQUENT: Primary | ICD-10-CM

## 2020-11-12 DIAGNOSIS — B07.9 VIRAL WART ON FINGER: ICD-10-CM

## 2020-11-12 DIAGNOSIS — E78.00 PURE HYPERCHOLESTEROLEMIA: ICD-10-CM

## 2020-11-12 LAB
BASOPHILS # BLD: 0.1 K/UL (ref 0–0.1)
BASOPHILS NFR BLD: 1 % (ref 0–1)
CHOLEST SERPL-MCNC: 247 MG/DL
DIFFERENTIAL METHOD BLD: ABNORMAL
EOSINOPHIL # BLD: 0 K/UL (ref 0–0.4)
EOSINOPHIL NFR BLD: 0 % (ref 0–7)
ERYTHROCYTE [DISTWIDTH] IN BLOOD BY AUTOMATED COUNT: 12.2 % (ref 11.5–14.5)
HCT VFR BLD AUTO: 41.8 % (ref 35–47)
HDLC SERPL-MCNC: 87 MG/DL
HDLC SERPL: 2.8 {RATIO} (ref 0–5)
HGB BLD-MCNC: 13.8 G/DL (ref 11.5–16)
IMM GRANULOCYTES # BLD AUTO: 0 K/UL (ref 0–0.04)
IMM GRANULOCYTES NFR BLD AUTO: 0 % (ref 0–0.5)
LDLC SERPL CALC-MCNC: 140 MG/DL (ref 0–100)
LIPID PROFILE,FLP: ABNORMAL
LYMPHOCYTES # BLD: 1.3 K/UL (ref 0.8–3.5)
LYMPHOCYTES NFR BLD: 29 % (ref 12–49)
MCH RBC QN AUTO: 31.9 PG (ref 26–34)
MCHC RBC AUTO-ENTMCNC: 33 G/DL (ref 30–36.5)
MCV RBC AUTO: 96.5 FL (ref 80–99)
MONOCYTES # BLD: 0.4 K/UL (ref 0–1)
MONOCYTES NFR BLD: 10 % (ref 5–13)
NEUTS SEG # BLD: 2.8 K/UL (ref 1.8–8)
NEUTS SEG NFR BLD: 60 % (ref 32–75)
NRBC # BLD: 0 K/UL (ref 0–0.01)
NRBC BLD-RTO: 0 PER 100 WBC
PLATELET # BLD AUTO: 315 K/UL (ref 150–400)
PMV BLD AUTO: 8.6 FL (ref 8.9–12.9)
RBC # BLD AUTO: 4.33 M/UL (ref 3.8–5.2)
TRIGL SERPL-MCNC: 100 MG/DL (ref ?–150)
VLDLC SERPL CALC-MCNC: 20 MG/DL
WBC # BLD AUTO: 4.6 K/UL (ref 3.6–11)

## 2020-11-12 PROCEDURE — 1101F PT FALLS ASSESS-DOCD LE1/YR: CPT | Performed by: INTERNAL MEDICINE

## 2020-11-12 PROCEDURE — G9899 SCRN MAM PERF RSLTS DOC: HCPCS | Performed by: INTERNAL MEDICINE

## 2020-11-12 PROCEDURE — G8420 CALC BMI NORM PARAMETERS: HCPCS | Performed by: INTERNAL MEDICINE

## 2020-11-12 PROCEDURE — G8536 NO DOC ELDER MAL SCRN: HCPCS | Performed by: INTERNAL MEDICINE

## 2020-11-12 PROCEDURE — 99214 OFFICE O/P EST MOD 30 MIN: CPT | Performed by: INTERNAL MEDICINE

## 2020-11-12 PROCEDURE — 1090F PRES/ABSN URINE INCON ASSESS: CPT | Performed by: INTERNAL MEDICINE

## 2020-11-12 PROCEDURE — G0463 HOSPITAL OUTPT CLINIC VISIT: HCPCS | Performed by: INTERNAL MEDICINE

## 2020-11-12 PROCEDURE — G0439 PPPS, SUBSEQ VISIT: HCPCS | Performed by: INTERNAL MEDICINE

## 2020-11-12 PROCEDURE — 3017F COLORECTAL CA SCREEN DOC REV: CPT | Performed by: INTERNAL MEDICINE

## 2020-11-12 PROCEDURE — G8427 DOCREV CUR MEDS BY ELIG CLIN: HCPCS | Performed by: INTERNAL MEDICINE

## 2020-11-12 PROCEDURE — G8510 SCR DEP NEG, NO PLAN REQD: HCPCS | Performed by: INTERNAL MEDICINE

## 2020-11-12 NOTE — PROGRESS NOTES
This is the Subsequent Medicare Annual Wellness Exam, performed 12 months or more after the Initial AWV or the last Subsequent AWV    I have reviewed the patient's medical history in detail and updated the computerized patient record. Depression Risk Factor Screening:     3 most recent PHQ Screens 11/12/2020   Little interest or pleasure in doing things Not at all   Feeling down, depressed, irritable, or hopeless Not at all   Total Score PHQ 2 0       Alcohol Risk Screen   Do you average more than 1 drink per night or more than 7 drinks a week:  No    On any one occasion in the past three months have you have had more than 3 drinks containing alcohol:  No        Functional Ability and Level of Safety:   Hearing: Hearing is good. Activities of Daily Living:    Patient does total self care     Ambulation: with no difficulty     Fall Risk:  Fall Risk Assessment, last 12 mths 11/12/2020   Able to walk? Yes   Fall in past 12 months? No     Abuse Screen:  Patient is not abused       Cognitive Screening   Has your family/caregiver stated any concerns about your memory: no     Cognitive Screening: normal    Assessment/Plan   Education and counseling provided:  Are appropriate based on today's review and evaluation      Health Maintenance Due     There are no preventive care reminders to display for this patient.     Patient Care Team   Patient Care Team:  Britt Lomax MD as PCP - General  [facility] Apryl aRhman MD as PCP - [address] [place] [address] Provider  Dennis Salas MD (Urology)  Ap Pugh MD (Urology)  Mckay Lopez MD (Endocrinology)    History     Patient Active Problem List   Diagnosis Code    Hyperlipidemia E78.5    Osteoporosis M81.0    S/P lumpectomy, right breast Z98.890    Allergic cough R05    Vaginal dryness, menopausal N95.1    History of right breast cancer Z85.3     Past Medical History:   Diagnosis Date    Avascular necrosis ([facility] [address] age 45     right hip > replaced    AVN (avascular necrosis of bone) [facility]) age 56       (1997)    left hip > replaced    Breast cancer ([facility] [address] 5/21/2013    Ectopic pregnancy age 44     (26)    Osteoarthritis of finger(s) age 53       (36)    Osteoporosis age 51   (56)   [facility] Other and unspecified hyperlipidemia 7/6/2010    Proctitis     Sarcoidosis age 30   (200)    entirely resolved 1986      Past Surgical History:   Procedure Laterality Date    BONE GRAFT FEMUR HEAD/NECK/RIDGE  age 56    (5)    Left Hip    BREAST SURGERY PROCEDURE UNLISTED      BREST BIOPIES X 4 WITH 1 MALIGNANT.     ENDOSCOPY, COLON, DIAGNOSTIC  age 55   (2006    told to repeat in 5 years Dr. Ilya Marin LUMPECTOMY  5/22/2013    BREAST LUMPECTOMY WITH AXILLARY NODE DISSECTION performed by Sofiya Rodriguez MD at [address]  age 24      (26)    HX FRACTURE TX  age 51    (56)    5th metatarsal left foot    HX HEENT  2012    LASER SURGERY FOR \"HOLES IN RT & TETACHED RETINA IN LT\"    HX HEENT      EXTRACTION OF WISDOM TEETH    HX HEENT  8/2015    vitrectomy    HX HYSTERECTOMY  age 40       (46)   [facility] OOPHORECTOMY  age 41       (65)    RIGHT ONLY    HX ORTHOPAEDIC  2012    LEFT HIP REPLACEMENT    HX ORTHOPAEDIC  1997    LEFT BONE GRAFT DONE    HX ORTHOPAEDIC  1998    RIGHT HIP REVISION    HX ORTHOPAEDIC  2012    RIGHT HIP REVISION    HX REFRACTIVE SURGERY      HX TUBAL LIGATION  age 44      (26)   [facility] PELVIS/HIP JOINT SURGERY UNLISTED  age 45     (36)    RIGHT HIP REPLACEMENT    VT INCISE FINGER TENDON SHEATH   age 47    (65)    Trigger finger    VT REVISE TOTAL HIP REPLACEMENT  age 53     (56)    right hip    VT REVISE TOTAL HIP REPLACEMENT  age 65      (2012)    left hip    TREAT ECTOPIC PREG,NON REMVAL  age 44  (26)     Current Outpatient Medications   Medication Sig Dispense Refill    LORazepam (ATIVAN) 1 mg tablet Take 1 tab by mouth 30 minutes prior to MRI 1 Tab 0    cranberry fruit extract (CRANBERRY PO) Take  by mouth.  multivitamin (ONE A DAY) tablet Take 1 Tab by mouth daily.  aspirin delayed-release 81 mg tablet Take 1 Tab by mouth daily. 30 Tab 1    calcium-vitamin D (CALCIUM 500+D) 500 mg(1,250mg) -200 unit per tablet Take 2 Tabs by mouth daily.  ondansetron (ZOFRAN ODT) 4 mg disintegrating tablet Take 1 Tab by mouth every eight (8) hours as needed for Nausea or Vomiting.  6 Tab 0     Allergies   Allergen Reactions    Bactrim [Sulfamethoprim Ds] Other (comments)     Skin reaction    Ciprofloxacin Rash and Hives    Relafen [Nabumetone] Swelling     Facial Swelling       Family History   Problem Relation Age of Onset    Breast Cancer Mother 67        in situ    Cancer Mother 67        breast ca and esophageal    Heart defect Mother         murmur diag in 19's, never treated    Arthritis-osteo Mother         late 52's early 63's    Osteoporosis Mother 76    Other Mother 78        lupus - declared disease free in 2004    Arrhythmia Mother 80        pacemaker    Hypertension Father     Heart defect Father         murmur idagnosed in 19's    Kidney Disease Father 80        renal stent    High Cholesterol Father     Heart Disease Father         aortic stenosis    Osteoporosis Brother 47    Arthritis-osteo Brother 47    Other Brother         age 34 hypoglycemia & age 47 Raynaud    Heart Disease Maternal Grandfather         developed in 42's    Hypertension Paternal Grandfather     Kidney Disease Paternal Grandfather     Other Maternal Grandmother 71        amyotrophic lateral sclerosis (ALS)/ALS    Hypertension Brother 47    Other Brother 47        GERD    High Cholesterol Brother 47    Other Brother 62        Multilevel DDD    Other Brother 62        Spondylolithesis    Other Brother 62        Servin's Esophagus, Diverticulosis, Gastritis    Other Brother 62        Bladder neck obstruction, swollen adrenal glands, small vessel disease    Cancer Brother 63        Melanoma and Squamous Cell Carcinoma     Social History     Tobacco Use    Smoking status: Former Smoker     Packs/day: 0.25     Years: 6.00     Pack years: 1.50     Last attempt to quit: 1980     Years since quittin.8    Smokeless tobacco: Never Used   Substance Use Topics    Alcohol use: Yes     Alcohol/week: 0.8 standard drinks     Types: 1 Glasses of wine per week       Assessment & Plan:     Diagnoses and all orders for this visit:    1. Medicare annual wellness visit, subsequent  Health maintenance reviewed and updated with patient today at visit. 2. Screening for alcoholism    3. Pure hypercholesterolemia  Lifestyle management. -     CBC WITH AUTOMATED DIFF; Future  -     LIPID PANEL; Future    4. Osteoporosis, unspecified osteoporosis type, unspecified pathological fracture presence  Ca vit D and weightbearing exercises to maintain bone health. Followed by endocrine and on Reclast yearly. Bone density is up-to-date. 5. History of right breast cancer  Followed by oncology and up-to-date on mammograms  6. Viral wart on finger  Refer to dermatology    Follow-up and Dispositions    · Return in about 1 year (around 2021) for physical.       current treatment plan is effective, no change in therapy  lab results and schedule of future lab studies reviewed with patient. Subjective:   Skinny Perez was also seen for 1. Hypercholesterolemia:   Diet and Lifestyle: generally follows a low fat low cholesterol diet, generally follows a low sodium diet. Exercise: resistance and cardio. 2.  Hx of breast cancer: she is followed by [facility] for imaging and sees Dr Orin Collier oncology. 3.  Osteoporosis: Sees Dr Gifty Perrin at [address] [place] [address] and last reclast in . Calcium and diet and supplement vitamin D.   4. Notes bump on left third finger this week.    Current Outpatient Medications   Medication Sig Dispense Refill    LORazepam (ATIVAN) 1 mg tablet Take 1 tab by mouth 30 minutes prior to MRI 1 Tab 0    cranberry fruit extract (CRANBERRY PO) Take  by mouth.  multivitamin (ONE A DAY) tablet Take 1 Tab by mouth daily.  aspirin delayed-release 81 mg tablet Take 1 Tab by mouth daily. 30 Tab 1    calcium-vitamin D (CALCIUM 500+D) 500 mg(1,250mg) -200 unit per tablet Take 2 Tabs by mouth daily.  ondansetron (ZOFRAN ODT) 4 mg disintegrating tablet Take 1 Tab by mouth every eight (8) hours as needed for Nausea or Vomiting. 6 Tab 0       Review of Systems   Constitutional: Negative for fever and malaise/fatigue. HENT: Negative for congestion and sore throat. Respiratory: Negative for shortness of breath. Cardiovascular: Negative for chest pain and leg swelling. Gastrointestinal: Negative for abdominal pain and heartburn. Genitourinary: Negative for dysuria, frequency and urgency. Musculoskeletal: Negative for back pain and joint pain. Neurological: Negative for dizziness, sensory change, focal weakness and headaches. Psychiatric/Behavioral: Negative for depression and suicidal ideas. The patient does not have insomnia. Physical Exam  Vitals signs and nursing note reviewed. Constitutional:       General: She is not in acute distress. Appearance: She is well-developed. HENT:      Head: Normocephalic and atraumatic. Right Ear: Tympanic membrane and ear canal normal.      Left Ear: Tympanic membrane and ear canal normal.      Nose: Nose normal.   Eyes:      Conjunctiva/sclera: Conjunctivae normal.      Pupils: Pupils are equal, round, and reactive to light. Neck:      Musculoskeletal: Normal range of motion. Thyroid: No thyromegaly. Cardiovascular:      Rate and Rhythm: Normal rate and regular rhythm. Heart sounds: Normal heart sounds. No murmur. Pulmonary:      Effort: Pulmonary effort is normal.      Breath sounds: Normal breath sounds.    Chest:      Comments: Breast exam: breasts appear normal, no suspicious masses, no skin or nipple changes or axillary nodes.   Abdominal:      General: Bowel sounds are normal.      Palpations: Abdomen is soft. There is no mass. Tenderness: There is no abdominal tenderness. Musculoskeletal:      Right lower leg: No edema. Left lower leg: No edema. Lymphadenopathy:      Cervical: No cervical adenopathy. Skin:     Findings: No rash. Comments: left 3rd finger pink papule   Neurological:      General: No focal deficit present. Cranial Nerves: No cranial nerve deficit. Sensory: No sensory deficit. Psychiatric:         Mood and Affect: Mood normal.         Visit Vitals  /64   Pulse 69   Temp 97.5 °F (36.4 °C) (Temporal)   Resp 16   Ht 5' 1\" (1.549 m)   Wt 106 lb (48.1 kg)   SpO2 98%   BMI 20.03 kg/m²      Aspects of this note may have been generated using voice recognition software. Despite editing, there may be some syntax errors   I have discussed the diagnosis with the patient and the intended plan as seen in the above orders. The patient has received an after-visit summary and questions were answered concerning future plans. I have discussed any recommended medication side effects and warnings with the patient as well.   She has expressed understanding of the diagnosis and plan    Edwin Lange MD

## 2020-11-12 NOTE — PATIENT INSTRUCTIONS
Medicare Wellness Visit, Female The best way to live healthy is to have a lifestyle where you eat a well-balanced diet, exercise regularly, limit alcohol use, and quit all forms of tobacco/nicotine, if applicable. Regular preventive services are another way to keep healthy. Preventive services (vaccines, screening tests, monitoring & exams) can help personalize your care plan, which helps you manage your own care. Screening tests can find health problems at the earliest stages, when they are easiest to treat. [facility] follows the current, evidence-based guidelines published by the [facility] David Mccollum ([facility]) when recommending preventive services for our patients. Because we follow these guidelines, sometimes recommendations change over time as research supports it. (For example, mammograms used to be recommended annually. Even though Medicare will still pay for an annual mammogram, the newer guidelines recommend a mammogram every two years for women of average risk). Of course, you and your doctor may decide to screen more often for some diseases, based on your risk and your co-morbidities (chronic disease you are already diagnosed with). Preventive services for you include: - Medicare offers their members a free annual wellness visit, which is time for you and your primary care provider to discuss and plan for your preventive service needs. Take advantage of this benefit every year! 
-All adults over the age of 72 should receive the recommended pneumonia vaccines. Current USPSTF guidelines recommend a series of two vaccines for the best pneumonia protection.  
-All adults should have a flu vaccine yearly and a tetanus vaccine every 10 years.  
-All adults age 48 and older should receive the shingles vaccines (series of two vaccines). -All adults age 38-68 who are overweight should have a diabetes screening test once every three years. -All adults born between [birth year] and 1965 should be screened once for Hepatitis C. 
-Other screening tests and preventive services for persons with diabetes include: an eye exam to screen for diabetic retinopathy, a kidney function test, a foot exam, and stricter control over your cholesterol.  
-Cardiovascular screening for adults with routine risk involves an electrocardiogram (ECG) at intervals determined by your doctor.  
-Colorectal cancer screenings should be done for adults age 54-65 with no increased risk factors for colorectal cancer. There are a number of acceptable methods of screening for this type of cancer. Each test has its own benefits and drawbacks. Discuss with your doctor what is most appropriate for you during your annual wellness visit. The different tests include: colonoscopy (considered the best screening method), a fecal occult blood test, a fecal DNA test, and sigmoidoscopy. 
 
-A bone mass density test is recommended when a woman turns 65 to screen for osteoporosis. This test is only recommended one time, as a screening. Some providers will use this same test as a disease monitoring tool if you already have osteoporosis. -Breast cancer screenings are recommended every other year for women of normal risk, age 54-69. 
-Cervical cancer screenings for women over age 72 are only recommended with certain risk factors. Here is a list of your current Health Maintenance items (your personalized list of preventive services) with a due date: There are no preventive care reminders to display for this patient.

## 2020-12-01 DIAGNOSIS — N60.91 ATYPICAL DUCTAL HYPERPLASIA OF RIGHT BREAST: ICD-10-CM

## 2020-12-01 DIAGNOSIS — N60.92 ATYPICAL DUCTAL HYPERPLASIA OF LEFT BREAST: ICD-10-CM

## 2020-12-01 DIAGNOSIS — Z85.3 PERSONAL HISTORY OF BREAST CANCER: ICD-10-CM

## 2021-07-29 DIAGNOSIS — F40.240 CLAUSTROPHOBIA: ICD-10-CM

## 2021-07-29 DIAGNOSIS — F41.8 ANXIETY ABOUT HEALTH: ICD-10-CM

## 2021-07-29 RX ORDER — LORAZEPAM 1 MG/1
TABLET ORAL
Qty: 1 TABLET | Refills: 0 | Status: SHIPPED | OUTPATIENT
Start: 2021-07-29 | End: 2022-08-05 | Stop reason: SDUPTHER

## 2021-08-05 DIAGNOSIS — Z85.3 HISTORY OF BREAST CANCER: Primary | ICD-10-CM

## 2021-09-09 NOTE — TELEPHONE ENCOUNTER
Has been to her pharmacy. Late Entry:     On 9/1/21 patients wife called to ask (before scheduling new pt appt) if Dr. Wiliam Shore would write a letter for his work to exempt Mr. Vasquez from taking the covid vaccines. At first she stated it was because he had allergic reactions to vaccinations, then she changed her story to it's their [employer] views. She then stated that she has exemption forms from her children's doctors that exempts them from taking any vaccines. I explained for pediatric care, that is slightly different, they are the parent and can make those decisions for their children. For her , its not so, as we would have to have medical records to prove he is allergic to any vaccines before he will write a medical statement to say that. I consulted with the provider for confirmation, he stated he will not write the letter unless this is the actual case, but he highly recommends the covid vaccination. I told the patients wife this and gave her a heads up before he comes for his appointment, so he doesn't have the expectation he is getting this from Dr. Wiliam Shore. She verbalized understanding. Appt scheduled & confirmed. Call was ended.

## 2021-11-01 DIAGNOSIS — E78.00 PURE HYPERCHOLESTEROLEMIA: Primary | ICD-10-CM

## 2021-11-29 ENCOUNTER — TELEPHONE (OUTPATIENT)
Dept: INTERNAL MEDICINE CLINIC | Age: 71
End: 2021-11-29

## 2021-11-29 NOTE — TELEPHONE ENCOUNTER
Pt would like to speak with you about the content of a voice mail she received. She will be trying to reach you again tomorrow but would not mind if you reached out first.    Early morning is the best time to call. She says as early at 7:30 A. M. is the best time to call. Patient says if she does not answer, leave a message with your name and she will call right back as she probably did not recognize the caller ID.      Tania Vazquez ([facility]) [phone] (H)

## 2021-11-30 NOTE — TELEPHONE ENCOUNTER
Spoke with patient, she is concerned that voicemail listed her full name, date of appointment and Dr. Emmy Portillo full name. She did not want to change her appointment notification method that I offered her. She wanted to voice her concerns up the chain. I promised her that I would escalate her concern and sent her concern to our , Jarad Raymond for follow up.

## 2021-12-03 ENCOUNTER — OFFICE VISIT (OUTPATIENT)
Dept: INTERNAL MEDICINE CLINIC | Age: 71
End: 2021-12-03
Payer: MEDICARE

## 2021-12-03 VITALS
HEIGHT: 59 IN | HEART RATE: 74 BPM | RESPIRATION RATE: 16 BRPM | OXYGEN SATURATION: 99 % | WEIGHT: 103.4 LBS | TEMPERATURE: 97.8 F | SYSTOLIC BLOOD PRESSURE: 136 MMHG | DIASTOLIC BLOOD PRESSURE: 62 MMHG | BODY MASS INDEX: 20.84 KG/M2

## 2021-12-03 DIAGNOSIS — Z85.3 HISTORY OF RIGHT BREAST CANCER: ICD-10-CM

## 2021-12-03 DIAGNOSIS — Z00.00 MEDICARE ANNUAL WELLNESS VISIT, SUBSEQUENT: Primary | ICD-10-CM

## 2021-12-03 DIAGNOSIS — M81.0 OSTEOPOROSIS, UNSPECIFIED OSTEOPOROSIS TYPE, UNSPECIFIED PATHOLOGICAL FRACTURE PRESENCE: ICD-10-CM

## 2021-12-03 DIAGNOSIS — E78.00 PURE HYPERCHOLESTEROLEMIA: ICD-10-CM

## 2021-12-03 DIAGNOSIS — Z98.890 S/P LUMPECTOMY, RIGHT BREAST: ICD-10-CM

## 2021-12-03 PROCEDURE — G0463 HOSPITAL OUTPT CLINIC VISIT: HCPCS | Performed by: INTERNAL MEDICINE

## 2021-12-03 PROCEDURE — G0439 PPPS, SUBSEQ VISIT: HCPCS | Performed by: INTERNAL MEDICINE

## 2021-12-03 PROCEDURE — G8420 CALC BMI NORM PARAMETERS: HCPCS | Performed by: INTERNAL MEDICINE

## 2021-12-03 PROCEDURE — 3017F COLORECTAL CA SCREEN DOC REV: CPT | Performed by: INTERNAL MEDICINE

## 2021-12-03 PROCEDURE — G8510 SCR DEP NEG, NO PLAN REQD: HCPCS | Performed by: INTERNAL MEDICINE

## 2021-12-03 PROCEDURE — 1090F PRES/ABSN URINE INCON ASSESS: CPT | Performed by: INTERNAL MEDICINE

## 2021-12-03 PROCEDURE — 99213 OFFICE O/P EST LOW 20 MIN: CPT | Performed by: INTERNAL MEDICINE

## 2021-12-03 PROCEDURE — G8427 DOCREV CUR MEDS BY ELIG CLIN: HCPCS | Performed by: INTERNAL MEDICINE

## 2021-12-03 PROCEDURE — G8536 NO DOC ELDER MAL SCRN: HCPCS | Performed by: INTERNAL MEDICINE

## 2021-12-03 PROCEDURE — 1101F PT FALLS ASSESS-DOCD LE1/YR: CPT | Performed by: INTERNAL MEDICINE

## 2021-12-03 PROCEDURE — G9899 SCRN MAM PERF RSLTS DOC: HCPCS | Performed by: INTERNAL MEDICINE

## 2021-12-03 NOTE — PROGRESS NOTES
This is the Subsequent Medicare Annual Wellness Exam, performed 12 months or more after the Initial AWV or the last Subsequent AWV    I have reviewed the patient's medical history in detail and updated the computerized patient record. Assessment/Plan   Education and counseling provided:  Are appropriate based on today's review and evaluation    1. Medicare annual wellness visit, subsequent       Depression Risk Factor Screening     3 most recent PHQ Screens 12/3/2021   Little interest or pleasure in doing things Not at all   Feeling down, depressed, irritable, or hopeless Not at all   Total Score PHQ 2 0       Alcohol Risk Screen    Do you average more than 1 drink per night or more than 7 drinks a week:  No    On any one occasion in the past three months have you have had more than 3 drinks containing alcohol:  No        Functional Ability and Level of Safety    Hearing: Hearing is good. Activities of Daily Living: The home contains: no safety equipment. Patient does total self care      Ambulation: with no difficulty     Fall Risk:  Fall Risk Assessment, last 12 mths 12/3/2021   Able to walk? Yes   Fall in past 12 months? 0   Do you feel unsteady?  0   Are you worried about falling 0      Abuse Screen:  Patient is not abused       Cognitive Screening    Has your family/caregiver stated any concerns about your memory: no     Cognitive Screenin/30MMSE    Health Maintenance Due     Health Maintenance Due   Topic Date Due    Breast Cancer Screen Mammogram  2021       Patient Care Team   Patient Care Team:  Abelino Wilder MD as PCP - [facility]  Dian Thomas MD as PCP - [facility]  Pineda Horton MD (Urology)  Toan Orellana MD (Urology)  Lola Llanos MD (Endocrinology)    History     Patient Active Problem List   Diagnosis Code    Hyperlipidemia E78.5    Osteoporosis M81.0    S/P lumpectomy, right breast Z98.890    Allergic cough R05.8    Vaginal dryness, menopausal N95.1    History of right breast cancer Z85.3     Past Medical History:   Diagnosis Date    Avascular necrosis (HCC) age 45     right hip > replaced    AVN (avascular necrosis of bone) (HCC) age 56       (1997)    left hip > replaced    Breast cancer ([address] 5/21/2013    Ectopic pregnancy age 44     (26)    Osteoarthritis of finger(s) age 53       (36)    Osteoporosis age 51   (56)   Waunita Favorite Other and unspecified hyperlipidemia 7/6/2010    Proctitis     Sarcoidosis age 30   (200)    entirely resolved 1986      Past Surgical History:   Procedure Laterality Date    ENDOSCOPY, COLON, DIAGNOSTIC  age 55   (2006    told to repeat in 11 years Dr. Ramez Cummings LUMPECTOMY  5/22/2013    BREAST LUMPECTOMY WITH AXILLARY NODE DISSECTION performed by Clarita Mead MD at [address] [address]  age 24      (26)   Waunita Favorite HX FRACTURE TX  age 51    (56)    5th metatarsal left foot    HX HEENT  2012    LASER SURGERY FOR \"HOLES IN RT & TETACHED RETINA IN LT\"    HX HEENT      EXTRACTION OF WISDOM TEETH    HX HEENT  8/2015    vitrectomy    HX HYSTERECTOMY  age 40       (46)   Mai Dingwall OOPHORECTOMY  age 41       (65)    RIGHT ONLY    HX ORTHOPAEDIC  2012    LEFT HIP REPLACEMENT    HX ORTHOPAEDIC  1997    LEFT BONE GRAFT DONE    HX ORTHOPAEDIC  1998    RIGHT HIP REVISION    HX ORTHOPAEDIC  2012    RIGHT HIP REVISION    HX REFRACTIVE SURGERY      HX TUBAL LIGATION  age 44      (26)   Waunita Favorite PELVIS/HIP JOINT SURGERY UNLISTED  age 45     (36)    RIGHT HIP REPLACEMENT    DC BONE GRAFT FEMUR HEAD/NECK/RIDGE  age 56    (5)    Left Hip    DC BREAST SURGERY PROCEDURE UNLISTED      BREST BIOPIES X 4 WITH 1 MALIGNANT.     DC INCISE FINGER TENDON SHEATH   age 47    (2002)    Trigger finger    DC REVISE TOTAL HIP REPLACEMENT  age 53     (1998)    right hip    DC REVISE TOTAL HIP REPLACEMENT  age 65      (2012)    left hip    DC TREAT ECTOPIC PREG,NON REMVAL  age 44  (26) Current Outpatient Medications   Medication Sig Dispense Refill    LORazepam (ATIVAN) 1 mg tablet Take 1 tab by mouth 30 minutes prior to MRI 1 Tablet 0    cranberry fruit extract (CRANBERRY PO) Take  by mouth.  multivitamin (ONE A DAY) tablet Take 1 Tab by mouth daily.  aspirin delayed-release 81 mg tablet Take 1 Tab by mouth daily. 30 Tab 1    calcium-vitamin D (CALCIUM 500+D) 500 mg(1,250mg) -200 unit per tablet Take 2 Tabs by mouth daily.        Allergies   Allergen Reactions    Bactrim [Sulfamethoprim Ds] Other (comments)     Skin reaction    Ciprofloxacin Rash and Hives    Relafen [Nabumetone] Swelling     Facial Swelling       Family History   Problem Relation Age of Onset    Breast Cancer Mother 67        in situ    Cancer Mother 67        breast ca and esophageal    Heart defect Mother         murmur diag in 19's, never treated    Arthritis-osteo Mother         late 52's early 63's    Osteoporosis Mother 76    Other Mother 78        lupus - declared disease free in 2004    Arrhythmia Mother 80        pacemaker    Hypertension Father     Heart defect Father         murmur idagnosed in 19's    Kidney Disease Father 80        renal stent    High Cholesterol Father     Heart Disease Father         aortic stenosis    Osteoporosis Brother 47    Arthritis-osteo Brother 47    Other Brother         age 34 hypoglycemia & age 47 Raynaud    Heart Disease Maternal Grandfather         developed in 42's    Hypertension Paternal Grandfather     Kidney Disease Paternal Grandfather     Other Maternal Grandmother 71        amyotrophic lateral sclerosis (ALS)/ALS    Hypertension Brother 47    Other Brother 47        GERD    High Cholesterol Brother 47    Other Brother 62        Multilevel DDD    Other Brother 62        Spondylolithesis    Other Brother 62        Servin's Esophagus, Diverticulosis, Gastritis    Other Brother 62        Bladder neck obstruction, swollen adrenal glands, small vessel disease    Cancer Brother 61        Melanoma and Squamous Cell Carcinoma     Social History     Tobacco Use    Smoking status: Former Smoker     Packs/day: 0.25     Years: 6.00     Pack years: 1.50     Quit date: 1980     Years since quittin.9    Smokeless tobacco: Never Used   Substance Use Topics    Alcohol use: Yes     Alcohol/week: 0.8 standard drinks     Types: 1 Glasses of wine per week         Tea Key MD     Jayne Sandifer is a 80 y.o. female who was also seen today for a follow-up visit. Assessment & Plan:       ICD-10-CM ICD-9-CM    1. Medicare annual wellness visit, subsequent   Health maintenance reviewed and updated with patient today at visit. Z00.00 V70.0    2. Pure hypercholesterolemia   Continue with lifestyle management E78.00 272.0    3. Osteoporosis, unspecified osteoporosis type, unspecified pathological fracture presence   Followed by endocrine and is getting calcium with an vitamin D supplementing as needed. Currently not on medication. M81.0 733.00    4. History of right breast cancer   No evidence of recurrence. Z85.3 V10.3    5. S/P lumpectomy, right breast  Z98.890 V45.89      Follow-up and Dispositions    · Return in about 1 year (around 12/3/2022) for [address]       lab results and schedule of future lab studies reviewed with patient. Subjective:   Jayne Sandifer was also seen for Annual Wellness Visit  taking calcium and getting in her diet and vitamin D. Sees endocrine Dr Kristin Camp. Currently off of medication. Has been on fosamax and prolia in the past.   Eating healthy and exercise: cardio/resistance/stretching. Possible TIA and has been on ASA. Need [facility] mammogram from 2021. Review of Systems   Constitutional: Negative for fever and malaise/fatigue. HENT: Negative for congestion, hearing loss and sore throat. Eyes:        Hx of cataract     Respiratory: Negative for shortness of breath.     Cardiovascular: Negative for chest pain and leg swelling. Gastrointestinal: Negative for abdominal pain and heartburn. Genitourinary: Negative for dysuria and urgency. Musculoskeletal: Positive for neck pain (on and off and PT helps. ). Neurological: Negative for dizziness, sensory change, focal weakness and headaches. Psychiatric/Behavioral: Negative for depression.    - per HPI    Physical Exam  Vitals and nursing note reviewed. Constitutional:       General: She is not in acute distress. Appearance: She is well-developed. HENT:      Head: Normocephalic and atraumatic. Right Ear: Tympanic membrane and ear canal normal.      Left Ear: Tympanic membrane and ear canal normal.      Nose: Nose normal.   Eyes:      Conjunctiva/sclera: Conjunctivae normal.      Pupils: Pupils are equal, round, and reactive to light. Neck:      Thyroid: No thyromegaly. Cardiovascular:      Rate and Rhythm: Normal rate and regular rhythm. Heart sounds: Normal heart sounds. No murmur heard. Pulmonary:      Effort: Pulmonary effort is normal.      Breath sounds: Normal breath sounds. Chest:      Comments: Breast exam: breasts appear normal, no suspicious masses, no skin or nipple changes or axillary nodes. Abdominal:      General: Bowel sounds are normal.      Palpations: Abdomen is soft. There is no mass. Tenderness: There is no abdominal tenderness. Musculoskeletal:      Cervical back: Normal range of motion. Right lower leg: No edema. Left lower leg: No edema. Lymphadenopathy:      Cervical: No cervical adenopathy. Skin:     Findings: No rash. Neurological:      Cranial Nerves: No cranial nerve deficit. Sensory: No sensory deficit.       Comments: MMSE 30/30   Psychiatric:         Mood and Affect: Mood normal.       Visit Vitals  /62   Pulse 74   Temp 97.8 °F (36.6 °C) (Temporal)   Resp 16   Ht 4' 11.45\" (1.51 m)   Wt 103 lb 6.4 oz (46.9 kg)   SpO2 99%   BMI 20.57 kg/m² Aspects of this note may have been generated using voice recognition software. Despite editing, there may be some synta also errors   I have discussed the diagnosis with the patient and the intended plan as seen in the above orders. The patient has received an after-visit summary and questions were answered concerning future plans. I have discussed any recommended medication side effects and warnings with the patient as well.   She has expressed understanding of the diagnosis and plan    Hai Zurita MD

## 2021-12-03 NOTE — PATIENT INSTRUCTIONS

## 2021-12-13 ENCOUNTER — TELEPHONE (OUTPATIENT)
Dept: ONCOLOGY | Age: 71
End: 2021-12-13

## 2021-12-13 NOTE — TELEPHONE ENCOUNTER
Patient called and requested a call back from a RN. She stated that she has noticed something different on her breast and would like advice on whether she should pursue it or not. Please follow up.    CB# [phone]

## 2021-12-14 NOTE — TELEPHONE ENCOUNTER
Returned call to patient, ID verified X 2. [phone]. States during recent shower felt something \"whisker like\" at tip of left nipple, feels it is area of concern or possible skin tag. Encouraged patient to follow up with breast surgeon for evaluation, states will contact Dr. Abdon Rdz. Update to Provider.

## 2022-03-03 ENCOUNTER — TELEPHONE (OUTPATIENT)
Dept: ONCOLOGY | Age: 72
End: 2022-03-03

## 2022-03-03 NOTE — TELEPHONE ENCOUNTER
Follow up call to Dr. Letitia Mi' office, [phone], spoke with Claudio. Patient ID verified X 2. Records received from patient last visit with Dr. Letitia Mi in 12/2021, indicated need of follow up in their office. Per Claudio, patient was seen on 1/4/22, issue had resolved and no further care was planned. Confirmed office fax number, Claudio will fax visit notes to RN attention. Update to Provider.

## 2022-03-19 PROBLEM — Z85.3 HISTORY OF RIGHT BREAST CANCER: Status: ACTIVE | Noted: 2017-10-24

## 2022-03-22 ENCOUNTER — TELEPHONE (OUTPATIENT)
Dept: ONCOLOGY | Age: 72
End: 2022-03-22

## 2022-03-22 NOTE — TELEPHONE ENCOUNTER
PT called in wanting to know about her open MRI of Breast that shows it was placed with an 8/5/2021 date and expires on 9/22/2022- please advise CB# [phone]

## 2022-03-23 NOTE — TELEPHONE ENCOUNTER
RN returned call to patient, [phone]. Left message to return RN follow up call. [phone] Follow up call to patient, [phone], ID verified. Confirmed for patient that NP of our office had placed order in response to mammogram report received from [address] [place] [address]. Confirmed receipt of subsequent MRI done at [address] [place] [address]. Patient understands MRI does not need to be scheduled. Understanding verbalized of all. Update to Provider.

## 2022-05-02 ENCOUNTER — TELEPHONE (OUTPATIENT)
Dept: ONCOLOGY | Age: 72
End: 2022-05-02

## 2022-05-02 NOTE — TELEPHONE ENCOUNTER
Pt has a question about a mammogram.    CB# is [phone]    Pt has more questions for a nurse. Please call.     CB# is [phone]

## 2022-05-03 NOTE — TELEPHONE ENCOUNTER
Follow up call to patient, [phone]. Left VM requesting patient contact  to schedule OV with Provider, as has not been seen in almost 3 years. Scans to be ordered at visit. Contact info supplied. Update to Provider/Scheduling.

## 2022-05-03 NOTE — TELEPHONE ENCOUNTER
Patient called and confirmed appt for 6/23/22 @ 945am.      Additionally, Patient states that she last had a mammogram on 6/14/21; and an MRI on 8/4/21.

## 2022-05-05 ENCOUNTER — TELEPHONE (OUTPATIENT)
Dept: ONCOLOGY | Age: 72
End: 2022-05-05

## 2022-05-05 ENCOUNTER — TRANSCRIBE ORDER (OUTPATIENT)
Dept: SCHEDULING | Age: 72
End: 2022-05-05

## 2022-05-05 DIAGNOSIS — Z12.31 SCREENING MAMMOGRAM FOR HIGH-RISK PATIENT: Primary | ICD-10-CM

## 2022-05-05 NOTE — TELEPHONE ENCOUNTER
Returned patient call, ID verified X 2. [phone]. Patient has obtained records and discs of imagery from [facility] as part of transition to [facility]. Chart reviewed and reports are visible in Media. Encouraged patient to drop discs off when convenient, they will be added to PACS if needed, and returned to patient at [facility] on 6/23/22. Reviewed process for getting scheduled for mammogram, would like to go ahead and schedule, but reminded that order would not be placed until visit, would need order to be able to scheduled. Is hopeful that [facility] will be able to accommodate the dates that she has in mind for mammogram.     Understanding verbalized. Update to Provider.

## 2022-05-05 NOTE — TELEPHONE ENCOUNTER
Pt left a voicemail requesting a returned call from a nurse. Pt did not specify why. CB# is [phone]    Pt called to say mammogram was moved to 6/16.  She also wants an order for an MRI to be done in August,    CB# is [phone]

## 2022-05-18 ENCOUNTER — DOCUMENTATION ONLY (OUTPATIENT)
Dept: ONCOLOGY | Age: 72
End: 2022-05-18

## 2022-05-18 DIAGNOSIS — Z85.3 HISTORY OF RIGHT BREAST CANCER: ICD-10-CM

## 2022-05-18 DIAGNOSIS — R92.2 DENSE BREAST TISSUE ON MAMMOGRAM: Primary | ICD-10-CM

## 2022-05-18 DIAGNOSIS — Z98.890 S/P LUMPECTOMY, RIGHT BREAST: ICD-10-CM

## 2022-05-18 NOTE — PROGRESS NOTES
Attempted to reach patient off mobile phone number, no answer. Left a detailed voicemail letting patient know her Breast MRI has katarzyna ordered to be done in August 2022! MA also sent patient a [employer] message with this update.   Janette Prince

## 2022-06-16 ENCOUNTER — HOSPITAL ENCOUNTER (OUTPATIENT)
Dept: MAMMOGRAPHY | Age: 72
Discharge: HOME | End: 2022-06-16
Attending: INTERNAL MEDICINE
Payer: MEDICARE

## 2022-06-16 DIAGNOSIS — Z12.31 SCREENING MAMMOGRAM FOR HIGH-RISK PATIENT: ICD-10-CM

## 2022-06-16 PROCEDURE — 77063 BREAST TOMOSYNTHESIS BI: CPT

## 2022-06-23 ENCOUNTER — OFFICE VISIT (OUTPATIENT)
Dept: ONCOLOGY | Age: 72
End: 2022-06-23
Payer: MEDICARE

## 2022-06-23 VITALS
DIASTOLIC BLOOD PRESSURE: 53 MMHG | SYSTOLIC BLOOD PRESSURE: 131 MMHG | HEIGHT: 59 IN | HEART RATE: 78 BPM | WEIGHT: 107.5 LBS | BODY MASS INDEX: 21.67 KG/M2 | TEMPERATURE: 97.6 F | OXYGEN SATURATION: 97 %

## 2022-06-23 DIAGNOSIS — Z85.3 HISTORY OF RIGHT BREAST CANCER: Primary | ICD-10-CM

## 2022-06-23 PROCEDURE — G8536 NO DOC ELDER MAL SCRN: HCPCS | Performed by: INTERNAL MEDICINE

## 2022-06-23 PROCEDURE — 99213 OFFICE O/P EST LOW 20 MIN: CPT | Performed by: INTERNAL MEDICINE

## 2022-06-23 PROCEDURE — 1090F PRES/ABSN URINE INCON ASSESS: CPT | Performed by: INTERNAL MEDICINE

## 2022-06-23 PROCEDURE — 1123F ACP DISCUSS/DSCN MKR DOCD: CPT | Performed by: INTERNAL MEDICINE

## 2022-06-23 PROCEDURE — G9899 SCRN MAM PERF RSLTS DOC: HCPCS | Performed by: INTERNAL MEDICINE

## 2022-06-23 PROCEDURE — 1101F PT FALLS ASSESS-DOCD LE1/YR: CPT | Performed by: INTERNAL MEDICINE

## 2022-06-23 PROCEDURE — G8510 SCR DEP NEG, NO PLAN REQD: HCPCS | Performed by: INTERNAL MEDICINE

## 2022-06-23 PROCEDURE — G0463 HOSPITAL OUTPT CLINIC VISIT: HCPCS | Performed by: INTERNAL MEDICINE

## 2022-06-23 PROCEDURE — G8420 CALC BMI NORM PARAMETERS: HCPCS | Performed by: INTERNAL MEDICINE

## 2022-06-23 PROCEDURE — 3017F COLORECTAL CA SCREEN DOC REV: CPT | Performed by: INTERNAL MEDICINE

## 2022-06-23 PROCEDURE — G8427 DOCREV CUR MEDS BY ELIG CLIN: HCPCS | Performed by: INTERNAL MEDICINE

## 2022-06-23 NOTE — PROGRESS NOTES
HEME/ONC PROGRESS NOTE       Sadie Roman is a 71 y.o. 1950 female and presents with Follow-up and Breast Cancer    CC  Breast cancer dx 5/13    STAGE: T1bN0  ER+ her2 neg  oncotype low risk    TREATMENT COURSE: lumpectomy right 5/22/13,  XRT,  Arimidex to stop after 5 years due to low risk BCI. [place] HPI: Patient is seen for fu breast cancer not on any therapy. Doing well overall. No new issues. Healthy overall.    seen here 2019 per pt preference. mammo good 6/22. For MRI 8/22. Sees PCP regularly. No fevers/ chills/ chest pain/ SOB/ nausea/ vomiting/diarrhea/ pain/fatigue        Otherwise, complete ROS is per the symptom report form which has been scanned into the media section of the electronic medical record.       Past Medical History:   Diagnosis Date    Allergic cough 10/18/2016    Avascular necrosis [facility]) age 45     right hip > replaced    AVN (avascular necrosis of bone) ([facility]) age 56       (1997)    left hip > replaced    Breast cancer [address] [address] 5/21/2013    Ectopic pregnancy age 44     (26)    Osteoarthritis of finger(s) age 53       (36)    Osteoporosis age 51   (56)   [place] Other and unspecified hyperlipidemia 7/6/2010    Proctitis     Sarcoidosis age 30   (200)    entirely resolved 1986    Vaginal dryness, menopausal 11/10/2016     Past Surgical History:   Procedure Laterality Date    ENDOSCOPY, COLON, DIAGNOSTIC  age 55   (2006    told to repeat in 5 years Dr. Ladarius Gilliam Right 05/22/2013    Positive    HX BREAST BIOPSY Bilateral     negative    HX BREAST LUMPECTOMY  5/22/2013    BREAST LUMPECTOMY WITH AXILLARY NODE DISSECTION performed by Mary Bryan MD at [address]  age 24      (26)   [place] HX FRACTURE TX  age 51    (56)    5th metatarsal left foot    HX HEENT  2012    LASER SURGERY FOR \"HOLES IN RT & TETACHED RETINA IN LT\"    HX HEENT      EXTRACTION OF WISDOM TEETH    HX HEENT  8/2015    vitrectomy    HX HYSTERECTOMY  age 40       (46)   [place] OOPHORECTOMY  age 41       (65)    RIGHT ONLY    HX ORTHOPAEDIC      LEFT HIP REPLACEMENT    HX ORTHOPAEDIC      LEFT BONE GRAFT DONE    HX ORTHOPAEDIC      RIGHT HIP REVISION    HX ORTHOPAEDIC      RIGHT HIP REVISION    HX REFRACTIVE SURGERY      HX TUBAL LIGATION  age 44      (26)   Tomie Grater PELVIS/HIP JOINT SURGERY UNLISTED  age 45     (36)    RIGHT HIP REPLACEMENT    CA BONE GRAFT FEMUR HEAD/NECK/RIDGE  age 56    (5)    Left Hip    CA BREAST SURGERY PROCEDURE UNLISTED      BREST BIOPIES X 4 WITH 1 MALIGNANT.  CA INCISE FINGER TENDON SHEATH   age 47    ()    Trigger finger    CA REVISE TOTAL HIP REPLACEMENT  age 53     ()    right hip    CA REVISE TOTAL HIP REPLACEMENT  age 65      ()    left hip    CA TREAT ECTOPIC PREG,NON REMVAL  age 44  (26)     Social History     Socioeconomic History    Marital status:    Tobacco Use    Smoking status: Former Smoker     Packs/day: 0.25     Years: 6.00     Pack years: 1.50     Quit date: 1980     Years since quittin.5    Smokeless tobacco: Never Used   Vaping Use    Vaping Use: Never used   Substance and Sexual Activity    Alcohol use:  Yes     Alcohol/week: 0.8 standard drinks     Types: 1 Glasses of wine per week    Drug use: No    Sexual activity: Yes     Partners: Male     Birth control/protection: None     Family History   Problem Relation Age of Onset    Breast Cancer Mother 67        in situ    Cancer Mother 67        breast ca and esophageal    Heart defect Mother         murmur diag in 19's, never treated    OSTEOARTHRITIS Mother         late 52's early 63's    Osteoporosis Mother 76    Other Mother 78        lupus - declared disease free in     Arrhythmia Mother 80        pacemaker    Hypertension Father     Heart defect Father         murmur idagnosed in 19's    Kidney Disease Father 80        renal stent    High Cholesterol Father     Heart Disease Father         aortic stenosis    Osteoporosis Brother 47    OSTEOARTHRITIS Brother 47    Other Brother         age 34 hypoglycemia & age 47 Raynaud    Heart Disease Maternal Grandfather         developed in 42's    Hypertension Paternal Grandfather     Kidney Disease Paternal Grandfather     Other Maternal Grandmother 71        amyotrophic lateral sclerosis (ALS)/ALS    Hypertension Brother 47    Other Brother 47        GERD    High Cholesterol Brother 47    Other Brother 62        Multilevel DDD    Other Brother 62        Spondylolithesis    Other Brother 62        Servin's Esophagus, Diverticulosis, Gastritis    Other Brother 62        Bladder neck obstruction, swollen adrenal glands, small vessel disease    Cancer Brother 63        Melanoma and Squamous Cell Carcinoma       Current Outpatient Medications   Medication Sig Dispense Refill    LORazepam (ATIVAN) 1 mg tablet Take 1 tab by mouth 30 minutes prior to MRI 1 Tablet 0    cranberry fruit extract (CRANBERRY PO) Take  by mouth.  multivitamin (ONE A DAY) tablet Take 1 Tab by mouth daily.  aspirin delayed-release 81 mg tablet Take 1 Tab by mouth daily. 30 Tab 1    calcium-vitamin D (CALCIUM 500+D) 500 mg(1,250mg) -200 unit per tablet Take 2 Tabs by mouth daily. Allergies   Allergen Reactions    Bactrim [Sulfamethoprim Ds] Other (comments)     Skin reaction    Ciprofloxacin Rash and Hives    Relafen [Nabumetone] Swelling     Facial Swelling       Review of Systems  A complete review of systems was obtained, negative except as described above and as reported on ROS sheet scanned into system.          Objective:  Visit Vitals  BP (!) 131/53 (BP 1 Location: Left upper arm, BP Patient Position: Sitting)   Pulse 78   Temp 97.6 °F (36.4 °C) (Temporal)   Ht 4' 11\" (1.499 m)   Wt 107 lb 8 oz (48.8 kg)   SpO2 97%   BMI 21.71 kg/m²         Physical Exam:   General appearance - alert, well appearing, and in no distress, oriented to person, place, and time and normal appearing weight  Mental status - alert, oriented to person, place, and time, normal mood, behavior, speech, dress, motor activity, and thought processes  EYE-conj clear  Mouth - wearing a mask  Neck - supple  CV regular  resp clear  GI soft NT   Ext-no pedal edema noted  Skin-Warm and dry. No rashes/lesions noted. Neuro -alert, oriented, normal speech, no focal findings or movement disorder noted  Breast -no masses palpable bilaterally    Diagnostic Imaging   reviewed  Results for orders placed during the hospital encounter of 06/26/19    XR SPINE CERV 4 OR 5 V    Narrative  Cervical spine 5 views dated 6/26/2019    History is cervical radiculopathy    AP, lateral and oblique views of the cervical spine were obtained. The cervical  vertebrae are of normal height. There is evidence of cervical spondylosis. The  intervertebral disc spaces are well-maintained. There is evidence of neural  foraminal encroachment at the C5-6 level and, to a lesser degree, the C6-7  level. Impression  IMPRESSION: Evidence of cervical spondylosis as described above. Results for orders placed during the hospital encounter of 09/07/17    CT HEAD WO CONT    Narrative  EXAM:  CT HEAD WO CONT    INDICATION:   Bilateral vision changes for one week    COMPARISON: None. CONTRAST:  None. TECHNIQUE: Unenhanced CT of the head was performed using 5 mm images. Brain and  bone windows were generated. CT dose reduction was achieved through use of a  standardized protocol tailored for this examination and automatic exposure  control for dose modulation. FINDINGS:  The ventricles and sulci are normal in size, shape and configuration and  midline. There is no significant white matter disease. There is no intracranial  hemorrhage, extra-axial collection, mass, mass effect or midline shift. The  basilar cisterns are open. No acute infarct is identified.  The bone windows  demonstrate no abnormalities. The visualized portions of the paranasal sinuses  and mastoid air cells are clear. Vascular calcification is noted. Impression  IMPRESSION: No acute abnormality. Lab Results  Reviewed  Per PCP    Lab Results   Component Value Date/Time    WBC 5.5 11/18/2021 09:06 AM    HGB 14.0 11/18/2021 09:06 AM    HCT 41.9 11/18/2021 09:06 AM    PLATELET 668 81/92/5062 09:06 AM    MCV 96.1 11/18/2021 09:06 AM       Lab Results   Component Value Date/Time    Sodium 138 11/18/2021 09:06 AM    Potassium 4.6 11/18/2021 09:06 AM    Chloride 106 11/18/2021 09:06 AM    CO2 28 11/18/2021 09:06 AM    Anion gap 4 (L) 11/18/2021 09:06 AM    Glucose 88 11/18/2021 09:06 AM    BUN 22 (H) 11/18/2021 09:06 AM    Creatinine 0.82 11/18/2021 09:06 AM    BUN/Creatinine ratio 27 (H) 11/18/2021 09:06 AM    GFR est AA >60 11/18/2021 09:06 AM    GFR est non-AA >60 11/18/2021 09:06 AM    Calcium 9.6 11/18/2021 09:06 AM    Alk. phosphatase 75 11/18/2021 09:06 AM    Protein, total 6.4 11/18/2021 09:06 AM    Albumin 3.8 11/18/2021 09:06 AM    Globulin 2.6 11/18/2021 09:06 AM    A-G Ratio 1.5 11/18/2021 09:06 AM    ALT (SGPT) 25 11/18/2021 09:06 AM       Assessment/Plan:    1. History of Stage 1 ER+ Her2 neg Breast cancer hx lump 2013 / XRT. Last seen 2019. Doing well overall. Did adjuvant Arimidex for 5 years and BCI low risk so stopped AI. Mammogram good 6/22  Breast MRI for 8/22.   labs and routine HM with PCP     2. Osteoporosis  Per endocrine. Remains on prolia. 3.  Psychosocial. Mood good. Coping well. Has good support. Call if questions. Will follow-up in 12 months, sooner if needed. continue present plan, call if any problems  There are no Patient Instructions on file for this visit.        Celio Rivers DO

## 2022-06-23 NOTE — PROGRESS NOTES
Yesy Russ is a 71 y.o. female  Chief Complaint   Patient presents with    Follow-up    Breast Cancer     1. Have you been to the ER, urgent care clinic since your last visit? Hospitalized since your last visit? No.  2. Have you seen or consulted any other health care providers outside of the [address] [place] [address] since your last visit? Include any pap smears or colon screening.  No.

## 2022-08-05 DIAGNOSIS — F40.240 CLAUSTROPHOBIA: ICD-10-CM

## 2022-08-05 DIAGNOSIS — F41.8 ANXIETY ABOUT HEALTH: ICD-10-CM

## 2022-08-05 RX ORDER — LORAZEPAM 1 MG/1
TABLET ORAL
Qty: 1 TABLET | Refills: 0 | Status: SHIPPED | OUTPATIENT
Start: 2022-08-05

## 2022-08-16 ENCOUNTER — HOSPITAL ENCOUNTER (OUTPATIENT)
Dept: MRI IMAGING | Age: 72
Discharge: HOME | End: 2022-08-16
Attending: NURSE PRACTITIONER
Payer: MEDICARE

## 2022-08-16 VITALS — BODY MASS INDEX: 21.61 KG/M2 | WEIGHT: 107 LBS

## 2022-08-16 DIAGNOSIS — Z98.890 S/P LUMPECTOMY, RIGHT BREAST: ICD-10-CM

## 2022-08-16 DIAGNOSIS — R92.2 DENSE BREAST TISSUE ON MAMMOGRAM: ICD-10-CM

## 2022-08-16 DIAGNOSIS — Z85.3 HISTORY OF RIGHT BREAST CANCER: ICD-10-CM

## 2022-08-16 PROCEDURE — 74011000258 HC RX REV CODE- 258: Performed by: NURSE PRACTITIONER

## 2022-08-16 PROCEDURE — 74011000250 HC RX REV CODE- 250: Performed by: NURSE PRACTITIONER

## 2022-08-16 PROCEDURE — 77049 MRI BREAST C-+ W/CAD BI: CPT

## 2022-08-16 PROCEDURE — 74011250636 HC RX REV CODE- 250/636

## 2022-08-16 PROCEDURE — A9576 INJ PROHANCE MULTIPACK: HCPCS

## 2022-08-16 RX ORDER — SODIUM CHLORIDE 0.9 % (FLUSH) 0.9 %
10 SYRINGE (ML) INJECTION
Status: COMPLETED | OUTPATIENT
Start: 2022-08-16 | End: 2022-08-16

## 2022-08-16 RX ADMIN — SODIUM CHLORIDE 100 ML: 9 INJECTION, SOLUTION INTRAVENOUS at 13:44

## 2022-08-16 RX ADMIN — GADOTERIDOL 10 ML: 279.3 INJECTION, SOLUTION INTRAVENOUS at 13:43

## 2022-08-16 RX ADMIN — SODIUM CHLORIDE, PRESERVATIVE FREE 10 ML: 5 INJECTION INTRAVENOUS at 14:00

## 2022-08-18 DIAGNOSIS — Z85.3 HISTORY OF RIGHT BREAST CANCER: Primary | ICD-10-CM

## 2022-08-18 DIAGNOSIS — R92.2 DENSE BREAST TISSUE ON MAMMOGRAM: ICD-10-CM

## 2022-08-22 ENCOUNTER — TELEPHONE (OUTPATIENT)
Dept: INTERNAL MEDICINE CLINIC | Age: 72
End: 2022-08-22

## 2022-08-22 NOTE — TELEPHONE ENCOUNTER
Pt c/o waking up with \"sour stomach\" and lightheadedness x 1 week,this resolves after a bit but it continues to occur each morning. This occurs when lying in bed before getting out of bed. Room is not spinning. She is cncerened as this continues to happen each morning and dissipates after 30-60 min. No N/V/D. Does not take home BP. Will send to [facility] for recommendations and call patient back.

## 2022-08-24 ENCOUNTER — OFFICE VISIT (OUTPATIENT)
Dept: INTERNAL MEDICINE CLINIC | Age: 72
End: 2022-08-24
Payer: MEDICARE

## 2022-08-24 VITALS
BODY MASS INDEX: 21.29 KG/M2 | OXYGEN SATURATION: 97 % | TEMPERATURE: 98.2 F | RESPIRATION RATE: 18 BRPM | HEIGHT: 59 IN | HEART RATE: 67 BPM | WEIGHT: 105.6 LBS | DIASTOLIC BLOOD PRESSURE: 80 MMHG | SYSTOLIC BLOOD PRESSURE: 136 MMHG

## 2022-08-24 DIAGNOSIS — R11.0 NAUSEA: Primary | ICD-10-CM

## 2022-08-24 PROCEDURE — G0463 HOSPITAL OUTPT CLINIC VISIT: HCPCS | Performed by: INTERNAL MEDICINE

## 2022-08-24 PROCEDURE — 3017F COLORECTAL CA SCREEN DOC REV: CPT | Performed by: INTERNAL MEDICINE

## 2022-08-24 PROCEDURE — G9899 SCRN MAM PERF RSLTS DOC: HCPCS | Performed by: INTERNAL MEDICINE

## 2022-08-24 PROCEDURE — 99213 OFFICE O/P EST LOW 20 MIN: CPT | Performed by: INTERNAL MEDICINE

## 2022-08-24 PROCEDURE — G8536 NO DOC ELDER MAL SCRN: HCPCS | Performed by: INTERNAL MEDICINE

## 2022-08-24 PROCEDURE — 1090F PRES/ABSN URINE INCON ASSESS: CPT | Performed by: INTERNAL MEDICINE

## 2022-08-24 PROCEDURE — 1101F PT FALLS ASSESS-DOCD LE1/YR: CPT | Performed by: INTERNAL MEDICINE

## 2022-08-24 PROCEDURE — G8510 SCR DEP NEG, NO PLAN REQD: HCPCS | Performed by: INTERNAL MEDICINE

## 2022-08-24 PROCEDURE — G8420 CALC BMI NORM PARAMETERS: HCPCS | Performed by: INTERNAL MEDICINE

## 2022-08-24 PROCEDURE — G8427 DOCREV CUR MEDS BY ELIG CLIN: HCPCS | Performed by: INTERNAL MEDICINE

## 2022-08-24 RX ORDER — CRANBERRY FRUIT EXTRACT 250 MG
500 CAPSULE ORAL DAILY
COMMUNITY

## 2022-08-24 NOTE — PROGRESS NOTES
8/24/2022    Princess Oliveros 1950 is a 71y.o. year old female established patient,   here for evaluation of the following chief complaint(s):  Chief Complaint   Patient presents with    Abdominal Pain     NAUSEA           ASSESSMENT/PLAN:  Below is the assessment and plan developed based on review of pertinent history, physical exam, labs, studies, and medications. 1. Nausea     Recent symptoms have reasolved and normal exam today  May have been a form of BPPV or some acid indigestion  If happening again could try home Epley maneuvers or pepcid    Follow-up and Dispositions    Return if symptoms worsen or fail to improve. SUBJECTIVE/OBJECTIVE:  For the past week, each morning she has been awakening with a vague nausea, sour stomach, some lightheadedness, it improved throughout the day. Symptoms were not positional in nature. Lasted about 30 mins those days  No change in bowel or bladder habits  The past two mornings the symptoms have resolved  She has had intermittent BPPV so wonders if those symptoms were related         Review of Systems   Constitutional:  Negative for chills and fever. Respiratory:  Negative for cough and shortness of breath. Cardiovascular:  Negative for chest pain, palpitations and leg swelling. Gastrointestinal:  Positive for nausea (now improved). Negative for abdominal pain. Skin:  Negative for rash. Neurological:  Negative for dizziness, tingling and headaches. Physical Exam  Constitutional:       General: She is not in acute distress. Appearance: Normal appearance. She is not ill-appearing. HENT:      Head: Normocephalic and atraumatic. Eyes:      Conjunctiva/sclera: Conjunctivae normal.   Cardiovascular:      Rate and Rhythm: Normal rate and regular rhythm. Heart sounds: No murmur heard. Pulmonary:      Effort: Pulmonary effort is normal.      Breath sounds: Normal breath sounds. No wheezing.    Musculoskeletal:      Right lower leg: No edema. Left lower leg: No edema. Skin:     General: Skin is warm and dry. Neurological:      General: No focal deficit present. Mental Status: She is alert and oriented to person, place, and time. Mental status is at baseline. Cranial Nerves: Cranial nerves are intact. Motor: Motor function is intact. Coordination: Coordination is intact. Gait: Gait is intact. Psychiatric:         Mood and Affect: Mood normal.         Thought Content: Thought content normal.         Judgment: Judgment normal.        Vitals:    22 0905 22 0919   BP: (!) 150/78 136/80   Pulse: 67    Resp: 18    Temp: 98.2 °F (36.8 °C)    TempSrc: Temporal    SpO2: 97%    Weight: 105 lb 9.6 oz (47.9 kg)    Height: 4' 11\" (1.499 m)         The following sections were reviewed & updated as appropriate: Problem List, Allergies, PMH, PSH, FH, and SH. Patient Active Problem List   Diagnosis Code    Hyperlipidemia E78.5    Osteoporosis M81.0    S/P lumpectomy, right breast Z98.890    History of right breast cancer Z85.3        Current Outpatient Medications   Medication Sig Dispense Refill    cranberry fruit extract (cranberry extract) 250 mg capsule Take 500 mg by mouth daily. cranberry fruit extract (CRANBERRY PO) Take  by mouth.      multivitamin (ONE A DAY) tablet Take 1 Tab by mouth daily. aspirin delayed-release 81 mg tablet Take 1 Tab by mouth daily. 30 Tab 1    calcium-vitamin D (OS-MATTIE +D3) 500 mg-200 unit per tablet Take 2 Tabs by mouth daily.       LORazepam (ATIVAN) 1 mg tablet Take 1 tab by mouth 30 minutes prior to MRI (Patient not taking: Reported on 2022) 1 Tablet 0       Bactrim [sulfamethoprim ds], Ciprofloxacin, and Relafen [nabumetone]    Social History     Occupational History    Not on file   Tobacco Use    Smoking status: Former     Packs/day: 0.25     Years: 6.00     Pack years: 1.50     Types: Cigarettes     Quit date: 1980     Years since quittin.6 Smokeless tobacco: Never   Vaping Use    Vaping Use: Never used   Substance and Sexual Activity    Alcohol use: Yes     Alcohol/week: 0.8 standard drinks     Types: 1 Glasses of wine per week    Drug use: No    Sexual activity: Yes     Partners: Male     Birth control/protection: None            Disclaimer:  Aspects of this note may have been generated using Dragon voice recognition software. Despite editing, there may be some syntax errors   We discussed the expected course, resolution and complications of the diagnosis(es) in detail. I have discussed any significant medication side effects and warnings with the patient when indicated. I advised them to contact the office if their condition worsens, changes or fails to improve as anticipated. Patient expressed understanding of the diagnosis and plan. An electronic signature was used to authenticate this note.   -- Fernando Dennis MD

## 2022-11-23 DIAGNOSIS — E78.00 PURE HYPERCHOLESTEROLEMIA: Primary | ICD-10-CM

## 2022-12-06 ENCOUNTER — OFFICE VISIT (OUTPATIENT)
Dept: INTERNAL MEDICINE CLINIC | Age: 72
End: 2022-12-06
Payer: MEDICARE

## 2022-12-06 VITALS
BODY MASS INDEX: 21.69 KG/M2 | SYSTOLIC BLOOD PRESSURE: 137 MMHG | HEIGHT: 59 IN | RESPIRATION RATE: 18 BRPM | WEIGHT: 107.6 LBS | HEART RATE: 72 BPM | TEMPERATURE: 97.9 F | OXYGEN SATURATION: 98 % | DIASTOLIC BLOOD PRESSURE: 73 MMHG

## 2022-12-06 DIAGNOSIS — Z85.3 HISTORY OF RIGHT BREAST CANCER: ICD-10-CM

## 2022-12-06 DIAGNOSIS — E78.00 PURE HYPERCHOLESTEROLEMIA: ICD-10-CM

## 2022-12-06 DIAGNOSIS — Z00.00 MEDICARE ANNUAL WELLNESS VISIT, SUBSEQUENT: Primary | ICD-10-CM

## 2022-12-06 DIAGNOSIS — M81.0 OSTEOPOROSIS, UNSPECIFIED OSTEOPOROSIS TYPE, UNSPECIFIED PATHOLOGICAL FRACTURE PRESENCE: ICD-10-CM

## 2022-12-06 PROCEDURE — G8427 DOCREV CUR MEDS BY ELIG CLIN: HCPCS | Performed by: INTERNAL MEDICINE

## 2022-12-06 PROCEDURE — G0463 HOSPITAL OUTPT CLINIC VISIT: HCPCS | Performed by: INTERNAL MEDICINE

## 2022-12-06 PROCEDURE — 1101F PT FALLS ASSESS-DOCD LE1/YR: CPT | Performed by: INTERNAL MEDICINE

## 2022-12-06 PROCEDURE — 3017F COLORECTAL CA SCREEN DOC REV: CPT | Performed by: INTERNAL MEDICINE

## 2022-12-06 PROCEDURE — G8510 SCR DEP NEG, NO PLAN REQD: HCPCS | Performed by: INTERNAL MEDICINE

## 2022-12-06 PROCEDURE — G0439 PPPS, SUBSEQ VISIT: HCPCS | Performed by: INTERNAL MEDICINE

## 2022-12-06 PROCEDURE — 1090F PRES/ABSN URINE INCON ASSESS: CPT | Performed by: INTERNAL MEDICINE

## 2022-12-06 PROCEDURE — 99214 OFFICE O/P EST MOD 30 MIN: CPT | Performed by: INTERNAL MEDICINE

## 2022-12-06 PROCEDURE — G8420 CALC BMI NORM PARAMETERS: HCPCS | Performed by: INTERNAL MEDICINE

## 2022-12-06 PROCEDURE — G9899 SCRN MAM PERF RSLTS DOC: HCPCS | Performed by: INTERNAL MEDICINE

## 2022-12-06 PROCEDURE — G8536 NO DOC ELDER MAL SCRN: HCPCS | Performed by: INTERNAL MEDICINE

## 2022-12-06 RX ORDER — ROSUVASTATIN CALCIUM 10 MG/1
10 TABLET, COATED ORAL
Qty: 90 TABLET | Refills: 0 | Status: SHIPPED | OUTPATIENT
Start: 2022-12-06

## 2022-12-06 NOTE — ASSESSMENT & PLAN NOTE
Stable on Reclast therapy and also followed by endocrinology. Reviewed last note from her endocrinologist.  Jaime Sadlerw with calcium, vitamin D and exercise for her bone health.

## 2022-12-06 NOTE — ASSESSMENT & PLAN NOTE
Reviewed CV risk and recommend starting crestor. Also Continue with healthy lifestyle reviewed side effects. follow up 3 months. Check lab work in 3 months.

## 2022-12-06 NOTE — PROGRESS NOTES
This is the Subsequent Medicare Annual Wellness Exam, performed 12 months or more after the Initial AWV or the last Subsequent AWV    I have reviewed the patient's medical history in detail and updated the computerized patient record. Assessment/Plan   Education and counseling provided:  Are appropriate based on today's review and evaluation    1. Medicare annual wellness visit, subsequent     Depression Risk Factor Screening     3 most recent PHQ Screens 12/6/2022   Little interest or pleasure in doing things Not at all   Feeling down, depressed, irritable, or hopeless Not at all   Total Score PHQ 2 0       Alcohol & Drug Abuse Risk Screen   Do you average more than 1 drink per night or more than 7 drinks a week?: (P) No  On any one occasion in the past three months have you had more than 3 drinks containing alcohol?: (P) No          Functional Ability and Level of Safety   Hearing:  Hearing: (P) Patient reports hearing is good    Activities of Daily Living: The home contains: (P) grab bars, rugs  Functional ADLs: (P) Patient does total self care   Ambulation:  Patient ambulates: (P) with no difficulty   Fall Risk:  Fall Risk Assessment, last 12 mths 12/6/2022   Able to walk? Yes   Fall in past 12 months? 0   Do you feel unsteady? 0   Are you worried about falling 0     Abuse Screen:  Do you ever feel afraid of your partner?: (P) No  Are you in a relationship with someone who physically or mentally threatens you?: (P) No  Is it safe for you to go home?: (P) Yes      Cognitive Screening   Has your family/caregiver stated any concerns about your memory?: (P) No   Cognitive Screening: MMSE30/30    Health Maintenance Due   There are no preventive care reminders to display for this patient.       Patient Care Team   Patient Care Team:  Albaro Marquez MD as PCP - General  [facility] Rehana Pozo MD as PCP - [facility] Empaneled Provider  Joey Contreras MD (Urology)  Marcelina Dawn MD (Urology)  Isabel Man MD (Endocrinology Physician)  Lori Pichardo DO (Hematology and Oncology)    History     Patient Active Problem List   Diagnosis Code    Hyperlipidemia E78.5    Osteoporosis M81.0    S/P lumpectomy, right breast Z98.890    History of right breast cancer Z85.3     Past Medical History:   Diagnosis Date    Allergic cough 10/18/2016    Avascular necrosis [facility]) age 45     right hip > replaced    AVN (avascular necrosis of bone) ([facility] [address] age 56       (1997)    left hip > replaced    Breast cancer ([facility] [address] 5/21/2013    Ectopic pregnancy age 44     (26)    Osteoarthritis of finger(s) age 53       (36)    Osteoporosis age 51   (56)    Other and unspecified hyperlipidemia 7/6/2010    Proctitis     Sarcoidosis age 30   (1985)    entirely resolved 1986    Vaginal dryness, menopausal 11/10/2016      Past Surgical History:   Procedure Laterality Date    ENDOSCOPY, COLON, DIAGNOSTIC  age 55   (2006    told to repeat in 5 years Dr. Davon Morocho Right 05/22/2013    Positive    HX BREAST BIOPSY Bilateral     negative    HX BREAST LUMPECTOMY  5/22/2013    BREAST LUMPECTOMY WITH AXILLARY NODE DISSECTION performed by Tyson Richardson MD at [address]  age 24      (26)    HX FRACTURE TX  age 51    (56)    5th metatarsal left foot    HX HEENT  2012    LASER SURGERY FOR \"HOLES IN RT & TETACHED RETINA IN LT\"    HX HEENT      EXTRACTION OF WISDOM TEETH    HX HEENT  8/2015    vitrectomy    HX HYSTERECTOMY  age 40       (46)    [de-identified] OOPHORECTOMY  age 41       (65)    RIGHT ONLY    HX ORTHOPAEDIC  2012    LEFT HIP REPLACEMENT    HX ORTHOPAEDIC  1997    LEFT BONE GRAFT DONE    HX ORTHOPAEDIC  1998    RIGHT HIP REVISION    HX ORTHOPAEDIC  2012    RIGHT HIP REVISION    HX REFRACTIVE SURGERY      HX TUBAL LIGATION  age 44      (26)    PELVIS/HIP JOINT SURGERY UNLISTED  age 45     (36)    RIGHT HIP REPLACEMENT    AL BONE GRAFT FEMUR HEAD/NECK/RIDGE  age 56    (5)    Left Hip FL BREAST SURGERY PROCEDURE UNLISTED      BREST BIOPIES X 4 WITH 1 MALIGNANT. FL INCISE FINGER TENDON SHEATH   age 47    (2002)    Trigger finger    FL REVISE TOTAL HIP REPLACEMENT  age 53     (1998)    right hip    FL REVISE TOTAL HIP REPLACEMENT  age 65      (2012)    left hip    FL TREAT ECTOPIC PREG,NON REMVAL  age 44  (26)     Current Outpatient Medications   Medication Sig Dispense Refill    cranberry fruit extract (cranberry extract) 250 mg capsule Take 500 mg by mouth daily. LORazepam (ATIVAN) 1 mg tablet Take 1 tab by mouth 30 minutes prior to MRI (Patient not taking: Reported on 8/24/2022) 1 Tablet 0    cranberry fruit extract (CRANBERRY PO) Take  by mouth.      multivitamin (ONE A DAY) tablet Take 1 Tab by mouth daily. aspirin delayed-release 81 mg tablet Take 1 Tab by mouth daily. 30 Tab 1    calcium-vitamin D (OS-MATTIE +D3) 500 mg-200 unit per tablet Take 2 Tabs by mouth daily.        Allergies   Allergen Reactions    Bactrim [Sulfamethoprim Ds] Other (comments)     Skin reaction    Ciprofloxacin Rash and Hives    Relafen [Nabumetone] Swelling     Facial Swelling       Family History   Problem Relation Age of Onset    Breast Cancer Mother 67        in situ    Cancer Mother 67        breast ca and esophageal    Heart defect Mother         murmur diag in 19's, never treated    OSTEOARTHRITIS Mother         late 52's early 63's    Osteoporosis Mother 76    Other Mother 78        lupus - declared disease free in 2004    Arrhythmia Mother 80        pacemaker    Hypertension Father     Heart defect Father         murmur idagnosed in 19's    Kidney Disease Father 80        renal stent    High Cholesterol Father     Heart Disease Father         aortic stenosis    Osteoporosis Brother 47    OSTEOARTHRITIS Brother 47    Other Brother         age 34 hypoglycemia & age 47 Raynaud    Heart Disease Maternal Grandfather         developed in 42's    Hypertension Paternal Grandfather     Kidney Disease Paternal Grandfather     Other Maternal Grandmother 71        amyotrophic lateral sclerosis (ALS)/ALS    Hypertension Brother 47    Other Brother 47        GERD    High Cholesterol Brother 47    Other Brother 62        Multilevel DDD    Other Brother 62        Spondylolithesis    Other Brother 62        Servin's Esophagus, Diverticulosis, Gastritis    Other Brother 62        Bladder neck obstruction, swollen adrenal glands, small vessel disease    Cancer Brother 63        Melanoma and Squamous Cell Carcinoma     Social History     Tobacco Use    Smoking status: Former     Packs/day: 0.25     Years: 6.00     Pack years: 1.50     Types: Cigarettes     Quit date: 1980     Years since quittin.9    Smokeless tobacco: Never   Substance Use Topics    Alcohol use: Yes     Alcohol/week: 0.8 standard drinks     Types: 1 Glasses of wine per week         Kimo Albrecht MD     Jack Jean Baptiste is a 71 y.o. female who was also seen today for a follow-up visit. Assessment & Plan:   1. Medicare annual wellness visit, subsequent  Health maintenance reviewed and updated with patient today at visit. 2. Pure hypercholesterolemia  Assessment & Plan:  Reviewed CV risk and recommend starting crestor. Also Continue with healthy lifestyle reviewed side effects. follow up 3 months. Check lab work in 3 months. Orders:  -     LIPID PANEL; Future  -     AST; Future  -     ALT; Future  3. Osteoporosis, unspecified osteoporosis type, unspecified pathological fracture presence  Assessment & Plan:   Stable on Reclast therapy and also followed by endocrinology. Reviewed last note from her endocrinologist.  Jaylen Taveras with calcium, vitamin D and exercise for her bone health. 4. History of right breast cancer  Assessment & Plan:  No evidence of recurrence. Continue with yearly mammogram monitoring. 5. Ear wax and recommend DEBROX. H/o given also. Follow-up and Dispositions    Return in about 1 year (around 2023) for [employer] Subjective:   Karine Blank was seen for follow-up osteoporosis, hypercholesterolemia, history of breast cancer. She is also seeing an endocrinologist Dr Lukas Singh for management of her osteoporosis and currently is on yearly Reclast infusion. She has been on Fosamax and Prolia in the past.  Doing cardio and resistance training 3 x a week. Review of Systems   Constitutional:  Negative for fever, malaise/fatigue and weight loss. HENT:  Negative for congestion and sore throat. Eyes: Negative. Respiratory:  Negative for cough and shortness of breath. Cardiovascular:  Negative for chest pain and leg swelling. Gastrointestinal:  Negative for abdominal pain, blood in stool, constipation, diarrhea, heartburn and nausea. Genitourinary:  Negative for dysuria, frequency and urgency. Musculoskeletal:  Negative for back pain, joint pain and myalgias. Skin:  Negative for rash. Neurological:  Negative for dizziness, sensory change, focal weakness and headaches. Psychiatric/Behavioral:  Negative for depression.   - per HPI    Physical Exam  Vitals and nursing note reviewed. Constitutional:       General: She is not in acute distress. Appearance: She is well-developed. HENT:      Head: Normocephalic and atraumatic. Right Ear: Tympanic membrane normal.      Left Ear: Tympanic membrane and ear canal normal.      Ears:      Comments: Wax in right ear canal.     Nose: Nose normal.      Mouth/Throat:      Mouth: Mucous membranes are moist.      Pharynx: No posterior oropharyngeal erythema. Eyes:      Extraocular Movements: Extraocular movements intact. Conjunctiva/sclera: Conjunctivae normal.      Pupils: Pupils are equal, round, and reactive to light. Neck:      Thyroid: No thyromegaly. Cardiovascular:      Rate and Rhythm: Normal rate and regular rhythm. Heart sounds: Normal heart sounds. No murmur heard.   Pulmonary:      Effort: Pulmonary effort is normal. Breath sounds: Normal breath sounds. Chest:      Comments: Breast exam: breasts appear normal, no suspicious masses, no skin or nipple changes or axillary nodes     Abdominal:      General: Bowel sounds are normal.      Palpations: Abdomen is soft. There is no mass. Tenderness: There is no abdominal tenderness. Musculoskeletal:      Cervical back: Normal range of motion. Right lower leg: No edema. Left lower leg: No edema. Lymphadenopathy:      Cervical: No cervical adenopathy. Skin:     Findings: No rash. Neurological:      Cranial Nerves: No cranial nerve deficit. Sensory: No sensory deficit. Psychiatric:         Mood and Affect: Mood normal.   Visit Vitals  /73   Pulse 72   Temp 97.9 °F (36.6 °C) (Temporal)   Resp 18   Ht 4' 11\" (1.499 m)   Wt 107 lb 9.6 oz (48.8 kg)   SpO2 98%   BMI 21.73 kg/m²        Aspects of this note may have been generated using voice recognition software. Despite editing, there may be some syntax errors   I have discussed the diagnosis with the patient and the intended plan as seen in the above orders. The patient has received an after-visit summary and questions were answered concerning future plans. I have discussed any recommended medication side effects and warnings with the patient as well.   She has expressed understanding of the diagnosis and plan    Ana Iniguez MD

## 2022-12-06 NOTE — PATIENT INSTRUCTIONS
Medicare Wellness Visit, Female     The best way to live healthy is to have a lifestyle where you eat a well-balanced diet, exercise regularly, limit alcohol use, and quit all forms of tobacco/nicotine, if applicable. Regular preventive services are another way to keep healthy. Preventive services (vaccines, screening tests, monitoring & exams) can help personalize your care plan, which helps you manage your own care. Screening tests can find health problems at the earliest stages, when they are easiest to treat. [facility] follows the current, evidence-based guidelines published by the [facility]ollum ([facility]) when recommending preventive services for our patients. Because we follow these guidelines, sometimes recommendations change over time as research supports it. (For example, mammograms used to be recommended annually. Even though Medicare will still pay for an annual mammogram, the newer guidelines recommend a mammogram every two years for women of average risk). Of course, you and your doctor may decide to screen more often for some diseases, based on your risk and your co-morbidities (chronic disease you are already diagnosed with). Preventive services for you include:  - Medicare offers their members a free annual wellness visit, which is time for you and your primary care provider to discuss and plan for your preventive service needs.  Take advantage of this benefit every year!    -Over the age of 72 should receive the recommended pneumonia vaccines.    -All adults should have a flu vaccine yearly.  -All adults should have a tetanus vaccine every 10 years.   -Over the age 48 should receive the shingles vaccines.        -All adults should be screened once for Hepatitis C.  -All adults age 38-68 who are overweight should have a diabetes screening test once every three years.   -Other screening tests and preventive services for persons with diabetes include: an eye exam to screen for diabetic retinopathy, a kidney function test, a foot exam, and stricter control over your cholesterol.   -Cardiovascular screening for adults with routine risk involves an electrocardiogram (ECG) at intervals determined by your doctor.     -Colorectal cancer screenings should be done for adults age 39-70 with no increased risk factors for colorectal cancer. There are a number of acceptable methods of screening for this type of cancer. Each test has its own benefits and drawbacks. Discuss with your doctor what is most appropriate for you during your annual wellness visit. The different tests include: colonoscopy (considered the best screening method), a fecal occult blood test, a fecal DNA test, and sigmoidoscopy.    -Lung cancer screening is recommended annually with a low dose CT scan for adults between age 54 and 68, who have smoked at least 30 pack years (equivalent of 1 pack per day for 30 days), and who is a current smoker or quit less than 15 years ago.    -A bone mass density test is recommended when a woman turns 65 to screen for osteoporosis. This test is only recommended one time, as a screening. Some providers will use this same test as a disease monitoring tool if you already have osteoporosis. -Breast cancer screenings are recommended every other year for women of normal risk, age 54-69.    -Cervical cancer screenings for women over age 72 are only recommended with certain risk factors. Here is a list of your current Health Maintenance items (your personalized list of preventive services) with a due date: There are no preventive care reminders to display for this patient. You can try [facility] ear drops for your ear wax.

## 2022-12-07 ENCOUNTER — TELEPHONE (OUTPATIENT)
Dept: INTERNAL MEDICINE CLINIC | Age: 72
End: 2022-12-07

## 2022-12-07 NOTE — TELEPHONE ENCOUNTER
----- Message from [address] sent at 12/6/2022 10:33 AM EST -----  Subject: Message to Provider    QUESTIONS  Information for Provider? Martha Melendez needs to make an AWV for next year. She had   her appointment today, 12/6/2022. She would like to make it for Dec 6 or   after for next year. She prefers Tuesday or Thursday morning between   8-8:30am. Please call to set up. Thank you  ---------------------------------------------------------------------------  --------------  Andrew GARCIA  [phone]; OK to leave message on voicemail  ---------------------------------------------------------------------------  --------------  SCRIPT ANSWERS  Relationship to Patient?  Self

## 2022-12-15 ENCOUNTER — TELEPHONE (OUTPATIENT)
Dept: ONCOLOGY | Age: 72
End: 2022-12-15

## 2022-12-15 NOTE — TELEPHONE ENCOUNTER
Stanislaw Gilman verified     Patient wanted to report to Dr Ollie Luque some \"bumpy spots on the surgical right breast toward the right axilla, the right side is extremely sensitive to pressure but no bumps in the armpit\". Patient is concerned if she should treat this as her new normal ?    Informed patient that Dr Ollie Luque is out today and tomorrow and she said it is not an emergency although she would like some direction, should she be seen by Dr Elham Bhagat or her Tiajuana Fent her surgeon or have an earlier mammo ? Thanked us for the return call.

## 2022-12-16 NOTE — TELEPHONE ENCOUNTER
UPDATE:    HIPPA Verified. Called pt on mobile phone number listed. Patient was made aware per Provider reading message on concerns she was having, that she should see the Breast Surgeon in regards to this for the best answers. Patient verbalized understanding and expressed no question!   Mary Jarvis

## 2022-12-16 NOTE — TELEPHONE ENCOUNTER
Attempted to reach patient 2x off home phone number listed, no answer. Left a voicemail to give the office a call back!   Mary Jarvis

## 2022-12-20 NOTE — TELEPHONE ENCOUNTER
HIPPA Verified. Called pt on home phone number listed. Patient was made aware per Provider to call our office with an update after she sees her Surgeon. Patient verbalized understanding and let MA know she is scheduled to see  on December 28th, 2022! Stated she will call us to fu after!   Carly Yan

## 2022-12-20 NOTE — TELEPHONE ENCOUNTER
Attempted to reach patient off both home phone number and mobile phone number listed under her spouses name in her chart 2x and no one answered the call. MA left a detailed VM requesting patient to call our office after she fu with her Breast Surgeon to receive update on what was discussed for 's review.   Bety Conway

## 2022-12-28 ENCOUNTER — TELEPHONE (OUTPATIENT)
Dept: ONCOLOGY | Age: 72
End: 2022-12-28

## 2022-12-28 DIAGNOSIS — Z12.31 BREAST CANCER SCREENING BY MAMMOGRAM: Primary | ICD-10-CM

## 2022-12-28 DIAGNOSIS — Z85.3 HISTORY OF RIGHT BREAST CANCER: ICD-10-CM

## 2022-12-28 NOTE — TELEPHONE ENCOUNTER
Patient would like a call back to discuss her care because she had her surgery done and was told to call back and update didn't want to make appointment until she talked with someone first

## 2022-12-28 NOTE — TELEPHONE ENCOUNTER
JAYNEM on designated line returning her call. Call when convenient. Patient returned call  Patient was advised by this office to follow up with her surgeon Dr Rosa Abebe for an area of concern on her breast. Patient stated she received a positive bill of health related to the area from Dr Rosa Abebe. Will request OV note . Patient was advised to \"skip her annual MRI as it is overkill\" by Dr Rosa Abebe. Recommendation was to have a \" 3D tomographic mammogram done at [place] [id number] or [place] done with a combo of contrasting dye and radiologist reads on site\"    Explained to patient that will make team aware and will get back to her with suggestions.

## 2023-02-16 NOTE — PROGRESS NOTES
Faxed authorization to disclose health information form to [facility]  to obtain pathology report for specimen collected on 5/4/17 Benzoyl Peroxide Pregnancy And Lactation Text: This medication is Pregnancy Category C. It is unknown if benzoyl peroxide is excreted in breast milk.

## 2023-02-19 ENCOUNTER — PATIENT MESSAGE (OUTPATIENT)
Dept: INTERNAL MEDICINE CLINIC | Age: 73
End: 2023-02-19

## 2023-02-21 RX ORDER — ROSUVASTATIN CALCIUM 10 MG/1
10 TABLET, COATED ORAL
Qty: 90 TABLET | Refills: 0 | Status: SHIPPED | OUTPATIENT
Start: 2023-02-21

## 2023-03-02 DIAGNOSIS — E78.00 PURE HYPERCHOLESTEROLEMIA: ICD-10-CM

## 2023-03-03 LAB
ALT SERPL-CCNC: 37 U/L (ref 12–78)
AST SERPL-CCNC: 32 U/L (ref 15–37)
CHOLEST SERPL-MCNC: 169 MG/DL
COMMENT, HOLDF: NORMAL
HDLC SERPL-MCNC: 86 MG/DL
HDLC SERPL: 2 (ref 0–5)
LDLC SERPL CALC-MCNC: 66.8 MG/DL (ref 0–100)
SAMPLES BEING HELD,HOLD: NORMAL
TRIGL SERPL-MCNC: 81 MG/DL (ref ?–150)
VLDLC SERPL CALC-MCNC: 16.2 MG/DL

## 2023-03-10 ENCOUNTER — OFFICE VISIT (OUTPATIENT)
Dept: INTERNAL MEDICINE CLINIC | Age: 73
End: 2023-03-10

## 2023-03-10 VITALS
RESPIRATION RATE: 16 BRPM | TEMPERATURE: 97.4 F | WEIGHT: 106 LBS | DIASTOLIC BLOOD PRESSURE: 73 MMHG | HEART RATE: 71 BPM | OXYGEN SATURATION: 100 % | HEIGHT: 59 IN | SYSTOLIC BLOOD PRESSURE: 148 MMHG | BODY MASS INDEX: 21.37 KG/M2

## 2023-03-10 DIAGNOSIS — H61.21 RIGHT EAR IMPACTED CERUMEN: ICD-10-CM

## 2023-03-10 DIAGNOSIS — E78.00 PURE HYPERCHOLESTEROLEMIA: Primary | ICD-10-CM

## 2023-03-10 NOTE — PROGRESS NOTES
HISTORY OF PRESENT ILLNESS  Brianna Valencia is a 68 y.o. female. Patient presents for hyperlipidemia follow-up. She is exercising 3 times per week. She does cardio and weights about 2 hours. She is sleeping well 6-8 hours per night. She gets up to urinate about every 2 hours, but wakes up feeling rested. She notes impacted cerumen in right ear. It was noted at last visit and she has tried OTC debrox with no relief. Visit Vitals  BP (!) 148/73   Pulse 71   Temp 97.4 °F (36.3 °C) (Temporal)   Resp 16   Ht 4' 11\" (1.499 m)   Wt 106 lb (48.1 kg)   SpO2 100%   BMI 21.41 kg/m²       HPI    Review of Systems   Cardiovascular:  Negative for chest pain and palpitations. Physical Exam  Constitutional:       Appearance: Normal appearance. HENT:      Right Ear: There is impacted cerumen. Cardiovascular:      Rate and Rhythm: Normal rate and regular rhythm. Pulmonary:      Effort: Pulmonary effort is normal.      Breath sounds: Normal breath sounds. Skin:     General: Skin is warm and dry. Neurological:      General: No focal deficit present. Mental Status: She is alert and oriented to person, place, and time. Psychiatric:         Mood and Affect: Mood normal.         Behavior: Behavior normal.       ASSESSMENT and PLAN    ICD-10-CM ICD-9-CM    1. Pure hypercholesterolemia  E78.00 272.0       2.  Right ear impacted cerumen  H61.21 380.4       Cerumen easily removed with lighted curette-TMs normal bilaterally, tolerated procedure well, canal normal  Continue current medication  Follow-up in 6 months

## 2023-04-22 DIAGNOSIS — Z12.31 BREAST CANCER SCREENING BY MAMMOGRAM: Primary | ICD-10-CM

## 2023-04-22 DIAGNOSIS — Z85.3 HISTORY OF RIGHT BREAST CANCER: ICD-10-CM

## 2023-05-24 DIAGNOSIS — E78.00 PURE HYPERCHOLESTEROLEMIA, UNSPECIFIED: Primary | ICD-10-CM

## 2023-05-24 RX ORDER — ROSUVASTATIN CALCIUM 10 MG/1
10 TABLET, COATED ORAL NIGHTLY
Qty: 90 TABLET | Refills: 0 | Status: SHIPPED | OUTPATIENT
Start: 2023-05-24

## 2023-06-20 ENCOUNTER — HOSPITAL ENCOUNTER (OUTPATIENT)
Facility: HOSPITAL | Age: 73
Discharge: HOME | End: 2023-06-23
Payer: MEDICARE

## 2023-06-20 DIAGNOSIS — Z85.3 HISTORY OF RIGHT BREAST CANCER: ICD-10-CM

## 2023-06-20 DIAGNOSIS — Z12.31 BREAST CANCER SCREENING BY MAMMOGRAM: ICD-10-CM

## 2023-06-20 PROCEDURE — 77067 SCR MAMMO BI INCL CAD: CPT

## 2023-06-26 ENCOUNTER — TELEPHONE (OUTPATIENT)
Age: 73
End: 2023-06-26

## 2023-06-26 NOTE — TELEPHONE ENCOUNTER
Call to [facility], [address] Mammogram of 6/20/23 remains in process, patient has OV on 6/27/23. RN contact info supplied. [facility] returned RN call, states patient requested that Dr. Aarti Shen read her mammogram or Dr. Bijal Cruz. Both are currently out of the office this week. Mammogram will be read after their RTW.   Update to Provider

## 2023-06-26 NOTE — TELEPHONE ENCOUNTER
Patient stated she recently had mammogram done and not sure if she need the results before her visit tomorrow. Please advise.

## 2023-06-27 ENCOUNTER — OFFICE VISIT (OUTPATIENT)
Age: 73
End: 2023-06-27
Payer: MEDICARE

## 2023-06-27 ENCOUNTER — TELEPHONE (OUTPATIENT)
Age: 73
End: 2023-06-27

## 2023-06-27 VITALS
RESPIRATION RATE: 16 BRPM | TEMPERATURE: 98 F | DIASTOLIC BLOOD PRESSURE: 68 MMHG | HEART RATE: 67 BPM | OXYGEN SATURATION: 98 % | WEIGHT: 106 LBS | SYSTOLIC BLOOD PRESSURE: 127 MMHG | BODY MASS INDEX: 21.41 KG/M2

## 2023-06-27 DIAGNOSIS — Z98.890 S/P LUMPECTOMY, RIGHT BREAST: ICD-10-CM

## 2023-06-27 DIAGNOSIS — Z85.3 HISTORY OF RIGHT BREAST CANCER: Primary | ICD-10-CM

## 2023-06-27 DIAGNOSIS — Z85.3 PERSONAL HISTORY OF MALIGNANT NEOPLASM OF BREAST: ICD-10-CM

## 2023-06-27 PROCEDURE — G8399 PT W/DXA RESULTS DOCUMENT: HCPCS | Performed by: INTERNAL MEDICINE

## 2023-06-27 PROCEDURE — G8420 CALC BMI NORM PARAMETERS: HCPCS | Performed by: INTERNAL MEDICINE

## 2023-06-27 PROCEDURE — 99213 OFFICE O/P EST LOW 20 MIN: CPT | Performed by: INTERNAL MEDICINE

## 2023-06-27 PROCEDURE — 1090F PRES/ABSN URINE INCON ASSESS: CPT | Performed by: INTERNAL MEDICINE

## 2023-06-27 PROCEDURE — 1123F ACP DISCUSS/DSCN MKR DOCD: CPT | Performed by: INTERNAL MEDICINE

## 2023-06-27 PROCEDURE — 3017F COLORECTAL CA SCREEN DOC REV: CPT | Performed by: INTERNAL MEDICINE

## 2023-06-27 PROCEDURE — 1036F TOBACCO NON-USER: CPT | Performed by: INTERNAL MEDICINE

## 2023-06-27 PROCEDURE — G8427 DOCREV CUR MEDS BY ELIG CLIN: HCPCS | Performed by: INTERNAL MEDICINE

## 2023-06-27 RX ORDER — THIAMINE HCL 100 MG
2 TABLET ORAL DAILY
COMMUNITY

## 2023-06-27 RX ORDER — M-VIT,TX,IRON,MINS/CALC/FOLIC 27MG-0.4MG
1 TABLET ORAL DAILY
COMMUNITY

## 2023-07-19 ENCOUNTER — PATIENT MESSAGE (OUTPATIENT)
Age: 73
End: 2023-07-19

## 2023-08-23 DIAGNOSIS — E78.00 PURE HYPERCHOLESTEROLEMIA, UNSPECIFIED: ICD-10-CM

## 2023-08-23 NOTE — TELEPHONE ENCOUNTER
Medication Refill Request    Jacqueline Ortiz is requesting a refill of the following medication(s):   Rosuvastatin 10mg  Please send refill to:     [facility] [id number] [phone]  [address] [place] [address]  Phone: [phone] Fax: [phone]

## 2023-08-24 RX ORDER — ROSUVASTATIN CALCIUM 10 MG/1
10 TABLET, COATED ORAL NIGHTLY
Qty: 90 TABLET | Refills: 3 | Status: SHIPPED | OUTPATIENT
Start: 2023-08-24

## 2023-08-24 NOTE — TELEPHONE ENCOUNTER
Chief Complaint   Patient presents with    Medication Refill     Last Appointment with Dr. Ragini Damon 12/6/22    Future Appointments   Date Time Provider [address] [place] [address]   12/7/2023  9:15 AM Boubacar Bentley MD [facility]   6/21/2024  8:30 AM S[facility] MAMMO 1 [facility]

## 2023-08-24 NOTE — TELEPHONE ENCOUNTER
[phone] pt says that she had to see her eye  yesterday on an emergency basis and the was suppose to send over information on this visit, wants to be sure that dr crook has received this. Previously Declined (within the last year)

## 2023-09-05 ENCOUNTER — PATIENT MESSAGE (OUTPATIENT)
Age: 73
End: 2023-09-05

## 2024-01-04 DIAGNOSIS — R92.333 HETEROGENEOUSLY DENSE TISSUE OF BOTH BREASTS ON MAMMOGRAPHY: ICD-10-CM

## 2024-01-04 DIAGNOSIS — Z85.3 HISTORY OF RIGHT BREAST CANCER: Primary | ICD-10-CM

## 2024-01-11 DIAGNOSIS — Z00.00 ENCOUNTER FOR GENERAL ADULT MEDICAL EXAMINATION WITHOUT ABNORMAL FINDINGS: ICD-10-CM

## 2024-01-11 DIAGNOSIS — E78.00 PURE HYPERCHOLESTEROLEMIA, UNSPECIFIED: Primary | ICD-10-CM

## 2024-01-15 DIAGNOSIS — Z00.00 ENCOUNTER FOR GENERAL ADULT MEDICAL EXAMINATION WITHOUT ABNORMAL FINDINGS: ICD-10-CM

## 2024-01-15 DIAGNOSIS — E78.00 PURE HYPERCHOLESTEROLEMIA, UNSPECIFIED: ICD-10-CM

## 2024-01-15 LAB
ALBUMIN SERPL-MCNC: 3.9 G/DL (ref 3.5–5)
ALBUMIN/GLOB SERPL: 1.4 (ref 1.1–2.2)
ALP SERPL-CCNC: 70 U/L (ref 45–117)
ALT SERPL-CCNC: 30 U/L (ref 12–78)
ANION GAP SERPL CALC-SCNC: 4 MMOL/L (ref 5–15)
AST SERPL-CCNC: 24 U/L (ref 15–37)
BASOPHILS # BLD: 0.1 K/UL (ref 0–0.1)
BASOPHILS NFR BLD: 1 % (ref 0–1)
BILIRUB SERPL-MCNC: 0.4 MG/DL (ref 0.2–1)
BUN SERPL-MCNC: 20 MG/DL (ref 6–20)
BUN/CREAT SERPL: 22 (ref 12–20)
CALCIUM SERPL-MCNC: 9.4 MG/DL (ref 8.5–10.1)
CHLORIDE SERPL-SCNC: 110 MMOL/L (ref 97–108)
CHOLEST SERPL-MCNC: 172 MG/DL
CO2 SERPL-SCNC: 29 MMOL/L (ref 21–32)
CREAT SERPL-MCNC: 0.93 MG/DL (ref 0.55–1.02)
DIFFERENTIAL METHOD BLD: ABNORMAL
EOSINOPHIL # BLD: 0.1 K/UL (ref 0–0.4)
EOSINOPHIL NFR BLD: 2 % (ref 0–7)
ERYTHROCYTE [DISTWIDTH] IN BLOOD BY AUTOMATED COUNT: 11.8 % (ref 11.5–14.5)
GLOBULIN SER CALC-MCNC: 2.7 G/DL (ref 2–4)
GLUCOSE SERPL-MCNC: 99 MG/DL (ref 65–100)
HCT VFR BLD AUTO: 42.1 % (ref 35–47)
HDLC SERPL-MCNC: 84 MG/DL
HDLC SERPL: 2 (ref 0–5)
HGB BLD-MCNC: 14 G/DL (ref 11.5–16)
IMM GRANULOCYTES # BLD AUTO: 0 K/UL (ref 0–0.04)
IMM GRANULOCYTES NFR BLD AUTO: 0 % (ref 0–0.5)
LDLC SERPL CALC-MCNC: 72 MG/DL (ref 0–100)
LYMPHOCYTES # BLD: 1.9 K/UL (ref 0.8–3.5)
LYMPHOCYTES NFR BLD: 37 % (ref 12–49)
MCH RBC QN AUTO: 32.3 PG (ref 26–34)
MCHC RBC AUTO-ENTMCNC: 33.3 G/DL (ref 30–36.5)
MCV RBC AUTO: 97.2 FL (ref 80–99)
MONOCYTES # BLD: 0.5 K/UL (ref 0–1)
MONOCYTES NFR BLD: 10 % (ref 5–13)
NEUTS SEG # BLD: 2.6 K/UL (ref 1.8–8)
NEUTS SEG NFR BLD: 50 % (ref 32–75)
NRBC # BLD: 0 K/UL (ref 0–0.01)
NRBC BLD-RTO: 0 PER 100 WBC
PLATELET # BLD AUTO: 285 K/UL (ref 150–400)
PMV BLD AUTO: 8.6 FL (ref 8.9–12.9)
POTASSIUM SERPL-SCNC: 4.3 MMOL/L (ref 3.5–5.1)
PROT SERPL-MCNC: 6.6 G/DL (ref 6.4–8.2)
RBC # BLD AUTO: 4.33 M/UL (ref 3.8–5.2)
SODIUM SERPL-SCNC: 143 MMOL/L (ref 136–145)
TRIGL SERPL-MCNC: 80 MG/DL
VLDLC SERPL CALC-MCNC: 16 MG/DL
WBC # BLD AUTO: 5.1 K/UL (ref 3.6–11)

## 2024-01-24 ENCOUNTER — OFFICE VISIT (OUTPATIENT)
Age: 74
End: 2024-01-24
Payer: MEDICARE

## 2024-01-24 VITALS
HEIGHT: 59 IN | SYSTOLIC BLOOD PRESSURE: 132 MMHG | DIASTOLIC BLOOD PRESSURE: 64 MMHG | HEART RATE: 64 BPM | OXYGEN SATURATION: 99 % | WEIGHT: 104.8 LBS | BODY MASS INDEX: 21.13 KG/M2 | TEMPERATURE: 97.8 F | RESPIRATION RATE: 16 BRPM

## 2024-01-24 DIAGNOSIS — M81.0 AGE-RELATED OSTEOPOROSIS WITHOUT CURRENT PATHOLOGICAL FRACTURE: ICD-10-CM

## 2024-01-24 DIAGNOSIS — Z00.00 MEDICARE ANNUAL WELLNESS VISIT, SUBSEQUENT: Primary | ICD-10-CM

## 2024-01-24 DIAGNOSIS — E78.00 PURE HYPERCHOLESTEROLEMIA: ICD-10-CM

## 2024-01-24 DIAGNOSIS — E78.00 PURE HYPERCHOLESTEROLEMIA, UNSPECIFIED: ICD-10-CM

## 2024-01-24 PROCEDURE — 1123F ACP DISCUSS/DSCN MKR DOCD: CPT | Performed by: INTERNAL MEDICINE

## 2024-01-24 PROCEDURE — G0439 PPPS, SUBSEQ VISIT: HCPCS | Performed by: INTERNAL MEDICINE

## 2024-01-24 PROCEDURE — 3017F COLORECTAL CA SCREEN DOC REV: CPT | Performed by: INTERNAL MEDICINE

## 2024-01-24 PROCEDURE — 99214 OFFICE O/P EST MOD 30 MIN: CPT | Performed by: INTERNAL MEDICINE

## 2024-01-24 PROCEDURE — 1090F PRES/ABSN URINE INCON ASSESS: CPT | Performed by: INTERNAL MEDICINE

## 2024-01-24 PROCEDURE — G8427 DOCREV CUR MEDS BY ELIG CLIN: HCPCS | Performed by: INTERNAL MEDICINE

## 2024-01-24 PROCEDURE — 1036F TOBACCO NON-USER: CPT | Performed by: INTERNAL MEDICINE

## 2024-01-24 PROCEDURE — G8484 FLU IMMUNIZE NO ADMIN: HCPCS | Performed by: INTERNAL MEDICINE

## 2024-01-24 PROCEDURE — G8420 CALC BMI NORM PARAMETERS: HCPCS | Performed by: INTERNAL MEDICINE

## 2024-01-24 PROCEDURE — G8399 PT W/DXA RESULTS DOCUMENT: HCPCS | Performed by: INTERNAL MEDICINE

## 2024-01-24 RX ORDER — ROSUVASTATIN CALCIUM 10 MG/1
10 TABLET, COATED ORAL NIGHTLY
Qty: 90 TABLET | Refills: 3 | Status: SHIPPED | OUTPATIENT
Start: 2024-01-24

## 2024-01-24 SDOH — ECONOMIC STABILITY: FOOD INSECURITY: WITHIN THE PAST 12 MONTHS, YOU WORRIED THAT YOUR FOOD WOULD RUN OUT BEFORE YOU GOT MONEY TO BUY MORE.: NEVER TRUE

## 2024-01-24 SDOH — ECONOMIC STABILITY: INCOME INSECURITY: HOW HARD IS IT FOR YOU TO PAY FOR THE VERY BASICS LIKE FOOD, HOUSING, MEDICAL CARE, AND HEATING?: NOT HARD AT ALL

## 2024-01-24 SDOH — ECONOMIC STABILITY: HOUSING INSECURITY
IN THE LAST 12 MONTHS, WAS THERE A TIME WHEN YOU DID NOT HAVE A STEADY PLACE TO SLEEP OR SLEPT IN A SHELTER (INCLUDING NOW)?: NO

## 2024-01-24 SDOH — ECONOMIC STABILITY: FOOD INSECURITY: WITHIN THE PAST 12 MONTHS, THE FOOD YOU BOUGHT JUST DIDN'T LAST AND YOU DIDN'T HAVE MONEY TO GET MORE.: NEVER TRUE

## 2024-01-24 ASSESSMENT — ENCOUNTER SYMPTOMS
ABDOMINAL PAIN: 0
BLOOD IN STOOL: 0
SHORTNESS OF BREATH: 0
DIARRHEA: 0
CONSTIPATION: 0
BACK PAIN: 0
COUGH: 0
NAUSEA: 0

## 2024-01-24 ASSESSMENT — LIFESTYLE VARIABLES
HAS A RELATIVE, FRIEND, DOCTOR, OR ANOTHER HEALTH PROFESSIONAL EXPRESSED CONCERN ABOUT YOUR DRINKING OR SUGGESTED YOU CUT DOWN: 0
HOW OFTEN DURING THE LAST YEAR HAVE YOU BEEN UNABLE TO REMEMBER WHAT HAPPENED THE NIGHT BEFORE BECAUSE YOU HAD BEEN DRINKING: 0
HOW OFTEN DURING THE LAST YEAR HAVE YOU HAD A FEELING OF GUILT OR REMORSE AFTER DRINKING: 0
HOW OFTEN DURING THE LAST YEAR HAVE YOU FOUND THAT YOU WERE NOT ABLE TO STOP DRINKING ONCE YOU HAD STARTED: 0
HAVE YOU OR SOMEONE ELSE BEEN INJURED AS A RESULT OF YOUR DRINKING: 0
HOW OFTEN DURING THE LAST YEAR HAVE YOU FAILED TO DO WHAT WAS NORMALLY EXPECTED FROM YOU BECAUSE OF DRINKING: 0
HOW OFTEN DO YOU HAVE A DRINK CONTAINING ALCOHOL: 4 OR MORE TIMES A WEEK
HOW OFTEN DURING THE LAST YEAR HAVE YOU NEEDED AN ALCOHOLIC DRINK FIRST THING IN THE MORNING TO GET YOURSELF GOING AFTER A NIGHT OF HEAVY DRINKING: 0
HOW MANY STANDARD DRINKS CONTAINING ALCOHOL DO YOU HAVE ON A TYPICAL DAY: 1 OR 2

## 2024-01-24 ASSESSMENT — PATIENT HEALTH QUESTIONNAIRE - PHQ9
SUM OF ALL RESPONSES TO PHQ QUESTIONS 1-9: 0
SUM OF ALL RESPONSES TO PHQ QUESTIONS 1-9: 0
1. LITTLE INTEREST OR PLEASURE IN DOING THINGS: 0
2. FEELING DOWN, DEPRESSED OR HOPELESS: 0
SUM OF ALL RESPONSES TO PHQ QUESTIONS 1-9: 0
SUM OF ALL RESPONSES TO PHQ QUESTIONS 1-9: 0
SUM OF ALL RESPONSES TO PHQ9 QUESTIONS 1 & 2: 0

## 2024-01-24 NOTE — PROGRESS NOTES
Medicare Annual Wellness Visit    Lynn Huddleston is here for Medicare AWV    Assessment & Plan   Medicare annual wellness visit, subsequent  Health maintenance reviewed and updated with patient today at visit.    Reviewed use of aspirin therapy and risk outweigh potential benefits.  She plans to stop her aspirin therapy.  Age-related osteoporosis without current pathological fracture  Assessment & Plan:   Monitored by specialist- no acute findings meriting change in the plan   She is on reclast and last infusion was summer of 2023. Ca vit D wt bearing exercise.   Pure hypercholesterolemia, unspecified  -     rosuvastatin (CRESTOR) 10 MG tablet; Take 1 tablet by mouth nightly, Disp-90 tablet, R-3Normal  Pure hypercholesterolemia  Assessment & Plan:   Well-controlled, continue current medications    Reviewed asa recommendations and at this point in her life.  Recommendations for Preventive Services Due: see orders and patient instructions/AVS.  Recommended screening schedule for the next 5-10 years is provided to the patient in written form: see Patient Instructions/AVS.     Return in about 1 year (around 1/24/2025) for MWV.     Subjective   The following acute and/or chronic problems were also addressed today:  Patient Active Problem List   Diagnosis    Osteoporosis    Personal history of malignant neoplasm of breast    Hyperlipidemia    S/P lumpectomy, right breast    Taking medication with no side effects.   Exercise: wt bearing Diet healthy    Has bone density scheduled for this summer.   Patient's complete Health Risk Assessment and screening values have been reviewed and are found in Flowsheets. The following problems were reviewed today and where indicated follow up appointments were made and/or referrals ordered.    No Positive Risk Factors identified today.      Review of Systems   Constitutional:  Negative for fever.   HENT:  Negative for congestion and sore throat.    Respiratory:  Negative for cough and

## 2024-01-24 NOTE — ASSESSMENT & PLAN NOTE
Monitored by specialist- no acute findings meriting change in the plan   She is on reclast and last infusion was summer of 2023. Ca vit D wt bearing exercise.

## 2024-06-19 ENCOUNTER — TELEPHONE (OUTPATIENT)
Age: 74
End: 2024-06-19

## 2024-06-19 NOTE — TELEPHONE ENCOUNTER
Reason for call:  TC from patient. Patient stated that she called the billing depart and was directed back to the office in regards to her EOB. Pt states that she was charged twice for AWV that was done on 1/24/2024. She would like to speak with someone to explain the codes & charges that are listed on her EOB. She also stated that last year she only received one charge for the AWV.     Is this a new problem: Yes    Date of last appointment:  1/24/2024     Can we respond via [employer]: No    Best call back number:     Lynn Huddleston (Self) [phone] (Home)

## 2024-06-21 ENCOUNTER — HOSPITAL ENCOUNTER (OUTPATIENT)
Facility: HOSPITAL | Age: 74
End: 2024-06-21
Attending: INTERNAL MEDICINE
Payer: MEDICARE

## 2024-06-21 DIAGNOSIS — Z12.31 VISIT FOR SCREENING MAMMOGRAM: ICD-10-CM

## 2024-06-21 PROCEDURE — 77063 BREAST TOMOSYNTHESIS BI: CPT

## 2024-06-24 DIAGNOSIS — Z85.3 HISTORY OF RIGHT BREAST CANCER: Primary | ICD-10-CM

## 2024-06-24 DIAGNOSIS — F40.240 CLAUSTROPHOBIA: ICD-10-CM

## 2024-06-24 RX ORDER — LORAZEPAM 0.5 MG/1
TABLET ORAL
Qty: 1 TABLET | Refills: 0 | Status: SHIPPED | OUTPATIENT
Start: 2024-06-24 | End: 2024-07-29

## 2024-07-03 ENCOUNTER — HOSPITAL ENCOUNTER (OUTPATIENT)
Facility: HOSPITAL | Age: 74
Discharge: HOME | End: 2024-07-06
Payer: MEDICARE

## 2024-07-03 DIAGNOSIS — R92.333 HETEROGENEOUSLY DENSE TISSUE OF BOTH BREASTS ON MAMMOGRAPHY: ICD-10-CM

## 2024-07-03 DIAGNOSIS — Z85.3 HISTORY OF RIGHT BREAST CANCER: ICD-10-CM

## 2024-07-03 PROCEDURE — C8908 MRI W/O FOL W/CONT, BREAST,: HCPCS

## 2024-07-03 PROCEDURE — 2580000003 HC RX 258: Performed by: NURSE PRACTITIONER

## 2024-07-03 PROCEDURE — A9579 GAD-BASE MR CONTRAST NOS,1ML: HCPCS | Performed by: NURSE PRACTITIONER

## 2024-07-03 PROCEDURE — 6360000004 HC RX CONTRAST MEDICATION: Performed by: NURSE PRACTITIONER

## 2024-07-03 RX ORDER — 0.9 % SODIUM CHLORIDE 0.9 %
100 INTRAVENOUS SOLUTION INTRAVENOUS ONCE
Status: COMPLETED | OUTPATIENT
Start: 2024-07-03 | End: 2024-07-03

## 2024-07-03 RX ADMIN — SODIUM CHLORIDE 100 ML: 9 INJECTION, SOLUTION INTRAVENOUS at 09:43

## 2024-07-03 RX ADMIN — GADOTERIDOL 10 ML: 279.3 INJECTION, SOLUTION INTRAVENOUS at 09:43

## 2024-10-18 ENCOUNTER — PATIENT MESSAGE (OUTPATIENT)
Age: 74
End: 2024-10-18

## 2025-01-14 ENCOUNTER — PATIENT MESSAGE (OUTPATIENT)
Age: 75
End: 2025-01-14

## 2025-01-14 DIAGNOSIS — E78.00 PURE HYPERCHOLESTEROLEMIA: Primary | ICD-10-CM

## 2025-01-14 DIAGNOSIS — M81.0 AGE-RELATED OSTEOPOROSIS WITHOUT CURRENT PATHOLOGICAL FRACTURE: ICD-10-CM

## 2025-01-21 DIAGNOSIS — E78.00 PURE HYPERCHOLESTEROLEMIA: ICD-10-CM

## 2025-01-21 LAB
ALBUMIN SERPL-MCNC: 4.2 G/DL (ref 3.5–5)
ALBUMIN/GLOB SERPL: 1.5 (ref 1.1–2.2)
ALP SERPL-CCNC: 69 U/L (ref 45–117)
ALT SERPL-CCNC: 27 U/L (ref 12–78)
ANION GAP SERPL CALC-SCNC: 4 MMOL/L (ref 2–12)
AST SERPL-CCNC: 31 U/L (ref 15–37)
BASOPHILS # BLD: 0.07 K/UL (ref 0–0.1)
BASOPHILS NFR BLD: 1.3 % (ref 0–1)
BILIRUB SERPL-MCNC: 0.5 MG/DL (ref 0.2–1)
BUN SERPL-MCNC: 26 MG/DL (ref 6–20)
BUN/CREAT SERPL: 29 (ref 12–20)
CALCIUM SERPL-MCNC: 9.9 MG/DL (ref 8.5–10.1)
CHLORIDE SERPL-SCNC: 106 MMOL/L (ref 97–108)
CHOLEST SERPL-MCNC: 205 MG/DL
CO2 SERPL-SCNC: 31 MMOL/L (ref 21–32)
CREAT SERPL-MCNC: 0.9 MG/DL (ref 0.55–1.02)
DIFFERENTIAL METHOD BLD: ABNORMAL
EOSINOPHIL # BLD: 0.06 K/UL (ref 0–0.4)
EOSINOPHIL NFR BLD: 1.1 % (ref 0–7)
ERYTHROCYTE [DISTWIDTH] IN BLOOD BY AUTOMATED COUNT: 11.5 % (ref 11.5–14.5)
GLOBULIN SER CALC-MCNC: 2.8 G/DL (ref 2–4)
GLUCOSE SERPL-MCNC: 96 MG/DL (ref 65–100)
HCT VFR BLD AUTO: 43.3 % (ref 35–47)
HDLC SERPL-MCNC: 95 MG/DL
HDLC SERPL: 2.2 (ref 0–5)
HGB BLD-MCNC: 14.4 G/DL (ref 11.5–16)
IMM GRANULOCYTES # BLD AUTO: 0.01 K/UL (ref 0–0.04)
IMM GRANULOCYTES NFR BLD AUTO: 0.2 % (ref 0–0.5)
LDLC SERPL CALC-MCNC: 94.2 MG/DL (ref 0–100)
LYMPHOCYTES # BLD: 1.76 K/UL (ref 0.8–3.5)
LYMPHOCYTES NFR BLD: 32.8 % (ref 12–49)
MCH RBC QN AUTO: 32.2 PG (ref 26–34)
MCHC RBC AUTO-ENTMCNC: 33.3 G/DL (ref 30–36.5)
MCV RBC AUTO: 96.9 FL (ref 80–99)
MONOCYTES # BLD: 0.53 K/UL (ref 0–1)
MONOCYTES NFR BLD: 9.9 % (ref 5–13)
NEUTS SEG # BLD: 2.93 K/UL (ref 1.8–8)
NEUTS SEG NFR BLD: 54.7 % (ref 32–75)
NRBC # BLD: 0 K/UL (ref 0–0.01)
NRBC BLD-RTO: 0 PER 100 WBC
PLATELET # BLD AUTO: 288 K/UL (ref 150–400)
PMV BLD AUTO: 8.8 FL (ref 8.9–12.9)
POTASSIUM SERPL-SCNC: 4 MMOL/L (ref 3.5–5.1)
PROT SERPL-MCNC: 7 G/DL (ref 6.4–8.2)
RBC # BLD AUTO: 4.47 M/UL (ref 3.8–5.2)
SODIUM SERPL-SCNC: 141 MMOL/L (ref 136–145)
TRIGL SERPL-MCNC: 79 MG/DL
VLDLC SERPL CALC-MCNC: 15.8 MG/DL
WBC # BLD AUTO: 5.4 K/UL (ref 3.6–11)

## 2025-02-05 ENCOUNTER — OFFICE VISIT (OUTPATIENT)
Age: 75
End: 2025-02-05
Payer: MEDICARE

## 2025-02-05 VITALS
OXYGEN SATURATION: 100 % | RESPIRATION RATE: 16 BRPM | WEIGHT: 102.2 LBS | TEMPERATURE: 98 F | BODY MASS INDEX: 20.6 KG/M2 | HEIGHT: 59 IN | HEART RATE: 68 BPM | SYSTOLIC BLOOD PRESSURE: 128 MMHG | DIASTOLIC BLOOD PRESSURE: 65 MMHG

## 2025-02-05 DIAGNOSIS — Z85.3 PERSONAL HISTORY OF MALIGNANT NEOPLASM OF BREAST: ICD-10-CM

## 2025-02-05 DIAGNOSIS — M81.0 AGE-RELATED OSTEOPOROSIS WITHOUT CURRENT PATHOLOGICAL FRACTURE: ICD-10-CM

## 2025-02-05 DIAGNOSIS — E78.00 PURE HYPERCHOLESTEROLEMIA: ICD-10-CM

## 2025-02-05 DIAGNOSIS — Z00.00 MEDICARE ANNUAL WELLNESS VISIT, SUBSEQUENT: Primary | ICD-10-CM

## 2025-02-05 PROCEDURE — 99213 OFFICE O/P EST LOW 20 MIN: CPT | Performed by: INTERNAL MEDICINE

## 2025-02-05 RX ORDER — ROSUVASTATIN CALCIUM 10 MG/1
10 TABLET, COATED ORAL NIGHTLY
Qty: 90 TABLET | Refills: 3 | Status: SHIPPED | OUTPATIENT
Start: 2025-02-05

## 2025-02-05 SDOH — ECONOMIC STABILITY: FOOD INSECURITY: WITHIN THE PAST 12 MONTHS, YOU WORRIED THAT YOUR FOOD WOULD RUN OUT BEFORE YOU GOT MONEY TO BUY MORE.: NEVER TRUE

## 2025-02-05 SDOH — ECONOMIC STABILITY: FOOD INSECURITY: WITHIN THE PAST 12 MONTHS, THE FOOD YOU BOUGHT JUST DIDN'T LAST AND YOU DIDN'T HAVE MONEY TO GET MORE.: NEVER TRUE

## 2025-02-05 ASSESSMENT — LIFESTYLE VARIABLES
HOW OFTEN DURING THE LAST YEAR HAVE YOU HAD A FEELING OF GUILT OR REMORSE AFTER DRINKING: NEVER
HOW OFTEN DO YOU HAVE A DRINK CONTAINING ALCOHOL: 4 OR MORE TIMES A WEEK
HAS A RELATIVE, FRIEND, DOCTOR, OR ANOTHER HEALTH PROFESSIONAL EXPRESSED CONCERN ABOUT YOUR DRINKING OR SUGGESTED YOU CUT DOWN: NO
HOW OFTEN DURING THE LAST YEAR HAVE YOU BEEN UNABLE TO REMEMBER WHAT HAPPENED THE NIGHT BEFORE BECAUSE YOU HAD BEEN DRINKING: NEVER
HAVE YOU OR SOMEONE ELSE BEEN INJURED AS A RESULT OF YOUR DRINKING: NO
HOW OFTEN DURING THE LAST YEAR HAVE YOU FOUND THAT YOU WERE NOT ABLE TO STOP DRINKING ONCE YOU HAD STARTED: NEVER
HOW OFTEN DURING THE LAST YEAR HAVE YOU NEEDED AN ALCOHOLIC DRINK FIRST THING IN THE MORNING TO GET YOURSELF GOING AFTER A NIGHT OF HEAVY DRINKING: NEVER
HOW OFTEN DURING THE LAST YEAR HAVE YOU FAILED TO DO WHAT WAS NORMALLY EXPECTED FROM YOU BECAUSE OF DRINKING: NEVER
HOW MANY STANDARD DRINKS CONTAINING ALCOHOL DO YOU HAVE ON A TYPICAL DAY: 1 OR 2

## 2025-02-05 ASSESSMENT — ENCOUNTER SYMPTOMS
NAUSEA: 0
COUGH: 0
SHORTNESS OF BREATH: 0
BACK PAIN: 0
SORE THROAT: 0
BLOOD IN STOOL: 0
CONSTIPATION: 0
DIARRHEA: 0
ABDOMINAL PAIN: 0

## 2025-02-05 ASSESSMENT — PATIENT HEALTH QUESTIONNAIRE - PHQ9
2. FEELING DOWN, DEPRESSED OR HOPELESS: NOT AT ALL
1. LITTLE INTEREST OR PLEASURE IN DOING THINGS: NOT AT ALL
SUM OF ALL RESPONSES TO PHQ QUESTIONS 1-9: 0
SUM OF ALL RESPONSES TO PHQ9 QUESTIONS 1 & 2: 0
SUM OF ALL RESPONSES TO PHQ QUESTIONS 1-9: 0

## 2025-02-05 NOTE — PROGRESS NOTES
Medicare Annual Wellness Visit    Lynn Huddleston is here for Medicare AWV    Assessment & Plan   Medicare annual wellness visit, subsequent  Health maintenance reviewed and updated with patient today at visit.    She will send me a [employer] message with when her last colonoscopy was performed and the GI specialist who did it so we can request results.  Pure hypercholesterolemia  Assessment & Plan:   Well-controlled, continue current medications  Age-related osteoporosis without current pathological fracture  Assessment & Plan:   Monitored by specialist- no acute findings meriting change in the plan   She is on reclast. Ca vit D wt bearing exercise.        Return in 1 year (on 2/5/2026) for Medicare AWV.     Subjective   The following acute and/or chronic problems were also addressed today:  Patient Active Problem List   Diagnosis    Osteoporosis    Personal history of malignant neoplasm of breast    Hyperlipidemia    S/P lumpectomy, right breast    Taking medication with no side effects.   Exercise: regular and goes to the gym cardio and resistance Diet low fat low chol and not eating processed foods.   Has been feeling well.   Has hx of BPPV and had a month ago symptoms which are now resolved.   Patient's complete Health Risk Assessment and screening values have been reviewed and are found in Flowsheets. The following problems were reviewed today and where indicated follow up appointments were made and/or referrals ordered.    No Positive Risk Factors identified today.                                    Objective   Vitals:    02/05/25 0943 02/05/25 1042   BP: (!) 147/70 128/65   Pulse: 68    Resp: 16    Temp: 98 °F (36.7 °C)    TempSrc: Temporal    SpO2: 100%    Weight: 46.4 kg (102 lb 3.2 oz)    Height: 1.499 m (4' 11\")       Body mass index is 20.64 kg/m².       Review of Systems   Constitutional:  Negative for fever.   HENT:  Negative for congestion and sore throat.    Respiratory:  Negative for cough and

## 2025-02-05 NOTE — ASSESSMENT & PLAN NOTE
Monitored by specialist- no acute findings meriting change in the plan  Has upcoming mammogram scheduled for June 2025.

## 2025-02-05 NOTE — ASSESSMENT & PLAN NOTE
Monitored by specialist- no acute findings meriting change in the plan   She is on reclast. Ca vit D wt bearing exercise.

## 2025-06-05 ENCOUNTER — PATIENT MESSAGE (OUTPATIENT)
Age: 75
End: 2025-06-05

## 2025-06-24 ENCOUNTER — HOSPITAL ENCOUNTER (OUTPATIENT)
Facility: HOSPITAL | Age: 75
Discharge: HOME | End: 2025-06-27
Attending: INTERNAL MEDICINE
Payer: MEDICARE

## 2025-06-24 DIAGNOSIS — Z12.31 ENCOUNTER FOR SCREENING MAMMOGRAM FOR HIGH-RISK PATIENT: ICD-10-CM

## 2025-06-24 PROCEDURE — 77063 BREAST TOMOSYNTHESIS BI: CPT

## 2025-07-01 ENCOUNTER — TELEPHONE (OUTPATIENT)
Age: 75
End: 2025-07-01

## 2025-07-01 ENCOUNTER — TRANSCRIBE ORDERS (OUTPATIENT)
Facility: HOSPITAL | Age: 75
End: 2025-07-01

## 2025-07-01 DIAGNOSIS — Z12.31 VISIT FOR SCREENING MAMMOGRAM: Primary | ICD-10-CM

## 2025-07-01 NOTE — TELEPHONE ENCOUNTER
Patient called and stated that she was unaware that Dr. Mendes is leaving the office and was not notified. Requested a call back to discuss her options on what she can do.     # [phone]

## 2025-07-02 NOTE — TELEPHONE ENCOUNTER
Returned phone call and she is very sad to hear the news.  Explained that her team is will still remain in the practice and provide support during this transition.  She mentioned Dr. Mendes has fallen off the [employer] and is unable to send messages.

## 2025-07-03 ENCOUNTER — TELEPHONE (OUTPATIENT)
Age: 75
End: 2025-07-03

## 2025-07-03 NOTE — TELEPHONE ENCOUNTER
Call to [facility], [phone], left VM requesting update on 6/24/25 mammogram results. Supplied RN contact info/hours.  Update to Provider.

## 2025-07-07 ENCOUNTER — PATIENT MESSAGE (OUTPATIENT)
Age: 75
End: 2025-07-07

## 2025-08-25 ENCOUNTER — TELEPHONE (OUTPATIENT)
Age: 75
End: 2025-08-25

## 2025-09-03 ENCOUNTER — TELEPHONE (OUTPATIENT)
Age: 75
End: 2025-09-03